# Patient Record
Sex: MALE | Race: AMERICAN INDIAN OR ALASKA NATIVE | Employment: OTHER | URBAN - METROPOLITAN AREA
[De-identification: names, ages, dates, MRNs, and addresses within clinical notes are randomized per-mention and may not be internally consistent; named-entity substitution may affect disease eponyms.]

---

## 2017-02-17 ENCOUNTER — OFFICE VISIT (OUTPATIENT)
Dept: FAMILY MEDICINE | Facility: CLINIC | Age: 62
End: 2017-02-17
Payer: COMMERCIAL

## 2017-02-17 VITALS
BODY MASS INDEX: 34.18 KG/M2 | SYSTOLIC BLOOD PRESSURE: 130 MMHG | HEART RATE: 84 BPM | HEIGHT: 74 IN | WEIGHT: 266.3 LBS | DIASTOLIC BLOOD PRESSURE: 98 MMHG

## 2017-02-17 DIAGNOSIS — I10 ESSENTIAL HYPERTENSION WITH GOAL BLOOD PRESSURE LESS THAN 140/90: Primary | ICD-10-CM

## 2017-02-17 DIAGNOSIS — H60.312 ACUTE DIFFUSE OTITIS EXTERNA OF LEFT EAR: ICD-10-CM

## 2017-02-17 PROCEDURE — 99214 OFFICE O/P EST MOD 30 MIN: CPT | Performed by: FAMILY MEDICINE

## 2017-02-17 RX ORDER — OFLOXACIN 3 MG/ML
10 SOLUTION AURICULAR (OTIC) 2 TIMES DAILY
Qty: 10 ML | Refills: 5 | Status: SHIPPED | OUTPATIENT
Start: 2017-02-17 | End: 2018-06-12

## 2017-02-17 RX ORDER — METOPROLOL SUCCINATE 50 MG/1
50 TABLET, EXTENDED RELEASE ORAL DAILY
Qty: 90 TABLET | Refills: 1 | Status: SHIPPED | OUTPATIENT
Start: 2017-02-17 | End: 2018-06-12 | Stop reason: SINTOL

## 2017-02-17 NOTE — PATIENT INSTRUCTIONS
We are putting you on Metoprolol XL 50 mg daily to control your blood pressure. Recheck in a month to see how this is working.

## 2017-02-17 NOTE — NURSING NOTE
"Chief Complaint   Patient presents with     Hypertension     recheck/refill on medication     Patient Request     concerns or talk about diverticulitis     Ear Problem     left ear concerns slight discomfort/drainage X 2 weeks. pt. has gtts. and states it is better. pt. states he needs more gtts.        Initial BP (!) 130/98 (BP Location: Right arm, Patient Position: Chair, Cuff Size: Adult Large)  Pulse 84  Ht 6' 2\" (1.88 m)  Wt 266 lb 4.8 oz (120.8 kg)  BMI 34.19 kg/m2 Estimated body mass index is 34.19 kg/(m^2) as calculated from the following:    Height as of this encounter: 6' 2\" (1.88 m).    Weight as of this encounter: 266 lb 4.8 oz (120.8 kg).  Medication Reconciliation: complete     Evelyn Patricia, ANGELLA      "

## 2017-02-17 NOTE — MR AVS SNAPSHOT
"              After Visit Summary   2/17/2017    Jayme Selby    MRN: [id number]           Patient Information     Date Of Birth          1955        Visit Information        Provider Department      2/17/2017 1:40 PM Post, MISSY West MD [facility]        Today's Diagnoses     Essential hypertension with goal blood pressure less than 140/90    -  1    Acute diffuse otitis externa of left ear          Care Instructions    We are putting you on Metoprolol XL 50 mg daily to control your blood pressure. Recheck in a month to see how this is working.        Follow-ups after your visit        Who to contact     If you have questions or need follow up information about today's clinic visit or your schedule please contact [facility] directly at [phone].  Normal or non-critical lab and imaging results will be communicated to you by MyChart, letter or phone within 4 business days after the clinic has received the results. If you do not hear from us within 7 days, please contact the clinic through Careemhart or phone. If you have a critical or abnormal lab result, we will notify you by phone as soon as possible.  Submit refill requests through DataFox or call your pharmacy and they will forward the refill request to us. Please allow 3 business days for your refill to be completed.          Additional Information About Your Visit        MyCharWatchParty Information     DataFox lets you send messages to your doctor, view your test results, renew your prescriptions, schedule appointments and more. To sign up, go to [email or web address] . Click on \"Log in\" on the left side of the screen, which will take you to the Welcome page. Then click on \"Sign up Now\" on the right side of the page.     You will be asked to enter the access code listed below, as well as some personal information. Please follow the directions to create your username and password.     Your access code is: [email or web address]  Expires: " "2017  2:17 PM     Your access code will  in 90 days. If you need help or a new code, please call your [place] clinic or [phone].        Care EveryWhere ID     This is your Care EveryWhere ID. This could be used by other organizations to access your [place] medical records  [id number]        Your Vitals Were     Pulse Height BMI (Body Mass Index)             84 6' 2\" (1.88 m) 34.19 kg/m2          Blood Pressure from Last 3 Encounters:   17 (!) 130/98   16 116/79   11/10/16 124/80    Weight from Last 3 Encounters:   17 266 lb 4.8 oz (120.8 kg)   16 257 lb (116.6 kg)   11/10/16 258 lb 1.6 oz (117.1 kg)              We Performed the Following     Asthma Action Plan (AAP)          Today's Medication Changes          These changes are accurate as of: 17  2:17 PM.  If you have any questions, ask your nurse or doctor.               Start taking these medicines.        Dose/Directions    metoprolol 50 MG 24 hr tablet   Commonly known as:  TOPROL-XL   Used for:  Essential hypertension with goal blood pressure less than 140/90   Started by:  MISSY Aceves MD        Dose:  50 mg   Take 1 tablet (50 mg) by mouth daily   Quantity:  90 tablet   Refills:  1       ofloxacin 0.3 % otic solution   Commonly known as:  FLOXIN   Used for:  Acute diffuse otitis externa of left ear   Started by:  MISSY Aceves MD        Dose:  10 drop   Place 10 drops Into the left ear 2 times daily for 7 days   Quantity:  10 mL   Refills:  5         Stop taking these medicines if you haven't already. Please contact your care team if you have questions.     losartan-hydrochlorothiazide 50-12.5 MG per tablet   Commonly known as:  HYZAAR   Stopped by:  MISSY Aceves MD                Where to get your medicines      These medications were sent to [facility] [address] [place] [address] AT [facility] OF [address] [place], [place] [address] [facility] [address]     Phone:  [phone] "     metoprolol 50 MG 24 hr tablet    ofloxacin 0.3 % otic solution                Primary Care Provider Office Phone # Fax #    R Brett Aceves [phone]       [address] [address]        Thank you!     Thank you for choosing [facility]  for your care. Our goal is always to provide you with excellent care. Hearing back from our patients is one way we can continue to improve our services. Please take a few minutes to complete the written survey that you may receive in the mail after your visit with us. Thank you!             Your Updated Medication List - Protect others around you: Learn how to safely use, store and throw away your medicines at www.disposemymeds.org.          This list is accurate as of: 2/17/17  2:17 PM.  Always use your most recent med list.                   Brand Name Dispense Instructions for use    albuterol 108 (90 BASE) MCG/ACT Inhaler    PROAIR HFA/PROVENTIL HFA/VENTOLIN HFA    1 Inhaler    Inhale 2 puffs into the lungs every 4 hours as needed       amoxicillin-clavulanate 875-125 MG per tablet    AUGMENTIN    20 tablet    Take 1 tablet by mouth 2 times daily       ascorbic acid 250 MG Chew chewable tablet    vitamin C     Take 500 mg by mouth daily       bismuth subsalicylate 262 MG/15ML suspension    PEPTO BISMOL     Take 15 mLs by mouth every 6 hours as needed for indigestion Reported on 2/17/2017       fluticasone 50 MCG/ACT spray    FLONASE    1 Bottle    Spray 2 sprays into both nostrils daily as needed       guaiFENesin-codeine 100-10 MG/5ML Soln solution    ROBITUSSIN AC    120 mL    Take 10 mLs by mouth every 4 hours as needed for cough       metoprolol 50 MG 24 hr tablet    TOPROL-XL    90 tablet    Take 1 tablet (50 mg) by mouth daily       nicotine polacrilex 2 MG lozenge    COMMIT     Place 2 mg inside cheek every hour as needed for smoking cessation       NYQUIL COLD & FLU PO      Take by mouth At  Bedtime Reported on 2/17/2017       ofloxacin 0.3 % otic solution    FLOXIN    10 mL    Place 10 drops Into the left ear 2 times daily for 7 days       ranitidine 75 MG tablet   Generic drug:  ranitidine      Take 1 tablet by mouth daily Reported on 2/17/2017       TUMS 500 MG chewable tablet   Generic drug:  calcium carbonate      Take 1 chew tab by mouth as needed for heartburn       ZYRTEC ALLERGY 10 MG tablet   Generic drug:  cetirizine      Take 10 mg by mouth daily

## 2017-02-17 NOTE — PROGRESS NOTES
"  SUBJECTIVE:                                                    Jayme Selby is a 62 year old male who presents to clinic today for the following health issues:      Hypertension Follow-up      Outpatient blood pressures are not being checked.    Low Salt Diet: low salt       Amount of exercise or physical activity: None    Problems taking medications regularly: yes; he discontinued the losartan/hydrochlorothiazide a couple months ago because he was convinced it was causing an acceptable side effects of nausea and gastrointestinal distress    Medication side effects: ? Cold and sick  Diet: regular (no restrictions)    PROBLEMS TO ADD ON...  Drainage left ear: He's had multiple surgeries on his left ear has had a myringotomy tube in that ear. He has been dealing with some drainage that he feels down the back of his throat. He used some leftover Ofloxacin drops and this seemed to help, so he was wondering if he get a refill    Problem list and histories reviewed & adjusted, as indicated.  Additional history: none              ROS:  Constitutional, HEENT, cardiovascular, pulmonary, gi and gu systems are negative, except as otherwise noted.        OBJECTIVE:                                                    BP (!) 130/98 (BP Location: Right arm, Patient Position: Chair, Cuff Size: Adult Large)  Pulse 84  Ht 6' 2\" (1.88 m)  Wt 266 lb 4.8 oz (120.8 kg)  BMI 34.19 kg/m2    GENERAL: healthy, alert and no distress  EYES: Eyes grossly normal to inspection, extraocular movements - intact, and PERRL  NECK: no tenderness, no adenopathy, no asymmetry, no masses, no stiffness; thyroid- normal to palpation  RESP: lungs clear to auscultation - no rales, no rhonchi, no wheezes  CV: regular rates and rhythm, normal S1 S2, no S3 or S4 and no murmur, no click or rub -  MS: extremities- no gross deformities noted, no edema         ASSESSMENT/PLAN:                                                    ASSESSMENT:  1. Essential " hypertension with goal blood pressure less than 140/90 , inadequately controlled due to noncompliance; it is difficult to convince him that the symptoms he is experiencing aren't not necessarily side effects of the medication    2. Acute diffuse otitis externa of left ear        PLAN:  Orders Placed This Encounter     Asthma Action Plan (AAP)     ofloxacin (FLOXIN) 0.3 % otic solution     metoprolol (TOPROL-XL) 50 MG 24 hr tablet   In reviewing the chart he has never been tried on a beta blocker, so will try metoprolol hopefully he will tolerate this.Discussed how to take the medication(s), expected outcomes, potential side effects.      Patient Instructions   We are putting you on Metoprolol XL 50 mg daily to control your blood pressure. Recheck in a month to see how this is working.      MISSY Aceves  [facility]

## 2017-02-18 ASSESSMENT — ASTHMA QUESTIONNAIRES: ACT_TOTALSCORE: 24

## 2017-02-20 ENCOUNTER — OFFICE VISIT (OUTPATIENT)
Dept: FAMILY MEDICINE | Facility: CLINIC | Age: 62
End: 2017-02-20
Payer: COMMERCIAL

## 2017-02-20 VITALS
DIASTOLIC BLOOD PRESSURE: 100 MMHG | WEIGHT: 266 LBS | BODY MASS INDEX: 34.14 KG/M2 | RESPIRATION RATE: 16 BRPM | HEIGHT: 74 IN | HEART RATE: 96 BPM | TEMPERATURE: 98 F | SYSTOLIC BLOOD PRESSURE: 162 MMHG | OXYGEN SATURATION: 98 %

## 2017-02-20 DIAGNOSIS — J01.90 ACUTE SINUSITIS WITH SYMPTOMS > 10 DAYS: Primary | ICD-10-CM

## 2017-02-20 DIAGNOSIS — I10 BENIGN ESSENTIAL HYPERTENSION: ICD-10-CM

## 2017-02-20 PROCEDURE — 99214 OFFICE O/P EST MOD 30 MIN: CPT | Performed by: NURSE PRACTITIONER

## 2017-02-20 RX ORDER — CHLORTHALIDONE 25 MG/1
25 TABLET ORAL DAILY
Qty: 30 TABLET | Refills: 1 | Status: SHIPPED | OUTPATIENT
Start: 2017-02-20 | End: 2017-03-10 | Stop reason: SINTOL

## 2017-02-20 NOTE — NURSING NOTE
"Chief Complaint   Patient presents with     URI       Initial BP (!) 162/100 (BP Location: Right arm, Patient Position: Chair, Cuff Size: Adult Large)  Pulse 96  Temp 98  F (36.7  C) (Oral)  Resp 16  Ht 6' 2\" (1.88 m)  Wt 266 lb (120.7 kg)  SpO2 98%  BMI 34.15 kg/m2 Estimated body mass index is 34.15 kg/(m^2) as calculated from the following:    Height as of this encounter: 6' 2\" (1.88 m).    Weight as of this encounter: 266 lb (120.7 kg).  Medication Reconciliation: complete  "

## 2017-02-20 NOTE — PATIENT INSTRUCTIONS
Will try a new medication for your blood pressure as it was quite high today.  Return to clinic next week to have the staff check your pressure to make sure it's coming down.  Augmentin for your sinuses.  Follow up if symptoms persist or worsen and as needed.        Thank you for choosing [facility].  You may be receiving a survey in the mail from [facility] regarding your visit today.  Please take a few minutes to complete and return the survey to let us know how we are doing.      Our Clinic hours are:  Mondays    7:20 am - 7 pm  Tues -  Fri  7:20 am - 5 pm    Clinic Phone: [phone]    The clinic lab opens at 7:30 am Mon - Fri and appointments are required.    [facility]  Ph. [phone]  Monday-Thursday 8 am - 7pm  Tues/Wed/Fri 8 am - 5:30 pm

## 2017-02-20 NOTE — MR AVS SNAPSHOT
After Visit Summary   2/20/2017    Jayme Selby    MRN: [id number]           Patient Information     Date Of Birth          1955        Visit Information        Provider Department      2/20/2017 3:20 PM Holly Robbins APRN CNP [facility]        Today's Diagnoses     Acute sinusitis with symptoms > 10 days    -  1    Benign essential hypertension          Care Instructions    Will try a new medication for your blood pressure as it was quite high today.  Return to clinic next week to have the staff check your pressure to make sure it's coming down.  Augmentin for your sinuses.  Follow up if symptoms persist or worsen and as needed.        Thank you for choosing [facility].  You may be receiving a survey in the mail from [employer] regarding your visit today.  Please take a few minutes to complete and return the survey to let us know how we are doing.      Our Clinic hours are:  Mondays    7:20 am - 7 pm  Tues -  Fri  7:20 am - 5 pm    Clinic Phone: [phone]    The clinic lab opens at 7:30 am Mon - Fri and appointments are required.    [facility]  Ph. [phone]  Monday-Thursday 8 am - 7pm  Tues/Wed/Fri 8 am - 5:30 pm               Follow-ups after your visit        Who to contact     If you have questions or need follow up information about today's clinic visit or your schedule please contact [facility] directly at [phone].  Normal or non-critical lab and imaging results will be communicated to you by MyChart, letter or phone within 4 business days after the clinic has received the results. If you do not hear from us within 7 days, please contact the clinic through MyChart or phone. If you have a critical or abnormal lab result, we will notify you by phone as soon as possible.  Submit refill requests through King[employer] Cloud or call your pharmacy and they will forward the refill request to us. Please allow 3 business days for  "your refill to be completed.          Additional Information About Your Visit        TechShophart Information     Coquelux lets you send messages to your doctor, view your test results, renew your prescriptions, schedule appointments and more. To sign up, go to [email or web address] . Click on \"Log in\" on the left side of the screen, which will take you to the Welcome page. Then click on \"Sign up Now\" on the right side of the page.     You will be asked to enter the access code listed below, as well as some personal information. Please follow the directions to create your username and password.     Your access code is: [email or web address]  Expires: 2017  2:17 PM     Your access code will  in 90 days. If you need help or a new code, please call your [place] clinic or [phone].        Care EveryWhere ID     This is your Care EveryWhere ID. This could be used by other organizations to access your [place] medical records  [id number]        Your Vitals Were     Pulse Temperature Respirations Height Pulse Oximetry BMI (Body Mass Index)    96 98  F (36.7  C) (Oral) 16 6' 2\" (1.88 m) 98% 34.15 kg/m2       Blood Pressure from Last 3 Encounters:   17 (!) (P) 164/110   17 (!) 130/98   16 116/79    Weight from Last 3 Encounters:   17 266 lb (120.7 kg)   17 266 lb 4.8 oz (120.8 kg)   16 257 lb (116.6 kg)              Today, you had the following     No orders found for display         Today's Medication Changes          These changes are accurate as of: 17  3:53 PM.  If you have any questions, ask your nurse or doctor.               Start taking these medicines.        Dose/Directions    amoxicillin-clavulanate 875-125 MG per tablet   Commonly known as:  AUGMENTIN   Used for:  Acute sinusitis with symptoms > 10 days   Started by:  Holly Robbins APRN CNP        Dose:  1 tablet   Take 1 tablet by mouth 2 times daily   Quantity:  20 tablet   Refills:  0       chlorthalidone 25 " MG tablet   Commonly known as:  HYGROTON   Used for:  Benign essential hypertension   Started by:  Holly Robbins APRN CNP        Dose:  25 mg   Take 1 tablet (25 mg) by mouth daily   Quantity:  30 tablet   Refills:  1            Where to get your medicines      These medications were sent to [facility] [address]  [address] [facility] [address]     Phone:  [phone]     amoxicillin-clavulanate 875-125 MG per tablet    chlorthalidone 25 MG tablet                Primary Care Provider Office Phone # Fax #    R Brett Aceves [phone]       [facility] [address] [facility] [address]        Thank you!     Thank you for choosing [facility]  for your care. Our goal is always to provide you with excellent care. Hearing back from our patients is one way we can continue to improve our services. Please take a few minutes to complete the written survey that you may receive in the mail after your visit with us. Thank you!             Your Updated Medication List - Protect others around you: Learn how to safely use, store and throw away your medicines at www.disposemymeds.org.          This list is accurate as of: 2/20/17  3:53 PM.  Always use your most recent med list.                   Brand Name Dispense Instructions for use    albuterol 108 (90 BASE) MCG/ACT Inhaler    PROAIR HFA/PROVENTIL HFA/VENTOLIN HFA    1 Inhaler    Inhale 2 puffs into the lungs every 4 hours as needed       amoxicillin-clavulanate 875-125 MG per tablet    AUGMENTIN    20 tablet    Take 1 tablet by mouth 2 times daily       ascorbic acid 250 MG Chew chewable tablet    vitamin C     Take 500 mg by mouth daily       bismuth subsalicylate 262 MG/15ML suspension    PEPTO BISMOL     Take 15 mLs by mouth every 6 hours as needed for indigestion Reported on 2/17/2017       chlorthalidone 25 MG tablet    HYGROTON    30  tablet    Take 1 tablet (25 mg) by mouth daily       fluticasone 50 MCG/ACT spray    FLONASE    1 Bottle    Spray 2 sprays into both nostrils daily as needed       metoprolol 50 MG 24 hr tablet    TOPROL-XL    90 tablet    Take 1 tablet (50 mg) by mouth daily       nicotine polacrilex 2 MG lozenge    COMMIT     Place 2 mg inside cheek every hour as needed for smoking cessation Reported on 2/20/2017       NYQUIL COLD & FLU PO      Take by mouth At Bedtime Reported on 2/17/2017       ofloxacin 0.3 % otic solution    FLOXIN    10 mL    Place 10 drops Into the left ear 2 times daily for 7 days       ranitidine 75 MG tablet   Generic drug:  ranitidine      Take 1 tablet by mouth daily Reported on 2/20/2017       TUMS 500 MG chewable tablet   Generic drug:  calcium carbonate      Take 1 chew tab by mouth as needed for heartburn       ZYRTEC ALLERGY 10 MG tablet   Generic drug:  cetirizine      Take 10 mg by mouth daily

## 2017-02-20 NOTE — PROGRESS NOTES
"  SUBJECTIVE:                                                    Jayme Selby is a 62 year old male who presents to clinic today for the following health issues:  not on any medications for blood pressure.    ENT Symptoms             Symptoms: cc Present Absent Comment   Fever/Chills  x     Fatigue  x     Muscle Aches  x     Eye Irritation  x     Sneezing  x     Nasal Cuauhtemoc/Drg  x     Sinus Pressure/Pain  x     Loss of smell   x    Dental pain  x  left   Sore Throat   x    Swollen Glands   x    Ear Pain/Fullness   x    Cough x x     Wheeze   x    Chest Pain   x    Shortness of breath  x  With productive cough   Rash   x    Other         Symptom duration:  2 years, every time he takes a blood pressure medication he gets a productive cough.   Symptom severity:  mod   Treatments tried:  stop taking all blood pressure medication.   Contacts:  everyone             Problem list and histories reviewed & adjusted, as indicated.  Additional history: he states he just doesn't feel well and he blames blood pressure medications on his recurrent concerns with URI and sinusitis.  He apparently has stopped his beta blocker because he became sick again and a family member questioned why he would \"ever be on that\"   After some discussion he seemed to calm down and be more understanding. He's apparently failed lisinopril, an ARB and Norvasc for various reasons.  He was to have a tooth extracted this week but stated with his pressure being so high they likely will not do that, that seemed to hit home with him.  Little to no motivation to stop his \"few smokes\" he does when socializing with friends over beers.  Sinus issues worsening over the past few days.      Patient Active Problem List   Diagnosis     Tobacco use disorder     Allergic rhinitis due to pollen     Hyperlipidemia LDL goal <130     Asthma     Advanced directives, counseling/discussion     Sinusitis, chronic     Allergic rhinitis due to animal dander     Seasonal allergic " rhinitis     Diagnostic skin and sensitization tests (aka ALLERGENS)     Mild intermittent asthma without complication     Essential hypertension with goal blood pressure less than 140/90     Sigmoid diverticulitis     Past Surgical History   Procedure Laterality Date     Cholecystectomy  09/1999     Ethmoidectomy  5/7/2014     Procedure: ETHMOIDECTOMY;  Surgeon: Kai Sandoval MD;  Location: [facility]     Ethmoidectomy  6/4/2014     Procedure: ETHMOIDECTOMY;  Surgeon: Kai Sandoval MD;  Location: [facility]     Tonsillectomy       Tympanotomy exploratory Left 1/29/2015     Procedure: TYMPANOTOMY EXPLORATORY;  Surgeon: Michel He MD;  Location:  OR       Social History   Substance Use Topics     Smoking status: Light Tobacco Smoker     Packs/day: 0.50     Years: 40.00     Types: Cigarettes     Smokeless tobacco: Never Used      Comment: occassional cig     Alcohol use Yes      Comment: 5-8 drinks 1 day per week     Family History   Problem Relation Age of Onset     DIABETES Mother      LUNG DISEASE Mother      Hypertension Father      C.A.D. Father      Hyperlipidemia Father          Current Outpatient Prescriptions   Medication Sig Dispense Refill     chlorthalidone (HYGROTON) 25 MG tablet Take 1 tablet (25 mg) by mouth daily 30 tablet 1     amoxicillin-clavulanate (AUGMENTIN) 875-125 MG per tablet Take 1 tablet by mouth 2 times daily 20 tablet 0     ofloxacin (FLOXIN) 0.3 % otic solution Place 10 drops Into the left ear 2 times daily for 7 days 10 mL 5     fluticasone (FLONASE) 50 MCG/ACT nasal spray Spray 2 sprays into both nostrils daily as needed 1 Bottle 3     bismuth subsalicylate (PEPTO BISMOL) 262 MG/15ML suspension Take 15 mLs by mouth every 6 hours as needed for indigestion Reported on 2/17/2017       ascorbic acid (VITAMIN C) 250 MG CHEW Take 500 mg by mouth daily       albuterol (PROAIR HFA, PROVENTIL HFA, VENTOLIN HFA) 108 (90 BASE) MCG/ACT inhaler Inhale 2 puffs into the lungs every  "4 hours as needed 1 Inhaler 5     calcium carbonate (TUMS) 500 MG chewable tablet Take 1 chew tab by mouth as needed for heartburn        DM-Doxylamine-Acetaminophen (NYQUIL COLD & FLU PO) Take by mouth At Bedtime Reported on 2/17/2017       cetirizine (ZYRTEC ALLERGY) 10 MG tablet Take 10 mg by mouth daily       metoprolol (TOPROL-XL) 50 MG 24 hr tablet Take 1 tablet (50 mg) by mouth daily (Patient not taking: Reported on 2/20/2017) 90 tablet 1     nicotine polacrilex (COMMIT) 2 MG lozenge Place 2 mg inside cheek every hour as needed for smoking cessation Reported on 2/20/2017       ranitidine (ZANTAC 75) 75 MG tablet Take 1 tablet by mouth daily Reported on 2/20/2017       Allergies   Allergen Reactions     Advair Diskus Other (See Comments)     Adverse reaction per previous medical records.  Vision changes     Lisinopril Cough     Per patient.     BP Readings from Last 3 Encounters:   02/20/17 (!) (P) 164/110   02/17/17 (!) 130/98   11/17/16 116/79    Wt Readings from Last 3 Encounters:   02/20/17 266 lb (120.7 kg)   02/17/17 266 lb 4.8 oz (120.8 kg)   11/17/16 257 lb (116.6 kg)                     ROS: 10 point ROS neg other than the symptoms noted above in the HPI.    OBJECTIVE:                                                    BP (!) (P) 164/110  Pulse 96  Temp 98  F (36.7  C) (Oral)  Resp 16  Ht 6' 2\" (1.88 m)  Wt 266 lb (120.7 kg)  SpO2 98%  BMI 34.15 kg/m2  Body mass index is 34.15 kg/(m^2).  GENERAL: healthy, alert and no distress  HENT: ear canals and TM's normal, pharynx with mild erythema, mild pan sinus tenderness  NECK: no adenopathy, no asymmetry  RESP: lungs clear to auscultation - no rales, rhonchi or wheezes  CV: regular rate and rhythm, normal S1 S2, no S3 or S4, no murmur  ABDOMEN: soft, nontender  MS: no gross musculoskeletal defects noted      Diagnostic Test Results:  No results found for this or any previous visit (from the past 24 hour(s)).     ASSESSMENT/PLAN:                              "                               1. Acute sinusitis with symptoms > 10 days    - amoxicillin-clavulanate (AUGMENTIN) 875-125 MG per tablet; Take 1 tablet by mouth 2 times daily  Dispense: 20 tablet; Refill: 0  Discussed how to take the medication(s), expected outcomes, potential side effects.    2. Benign essential hypertension    - chlorthalidone (HYGROTON) 25 MG tablet; Take 1 tablet (25 mg) by mouth daily  Dispense: 30 tablet; Refill: 1  Discussed how to take the medication(s), expected outcomes, potential side effects.  Long discussion regarding hypertension and the importance of treating that. He seemed to be understanding although medications are a huge barrier to him. He is in agreement to trying chlorthalidone and starting at a low dose and working up on that. Cautioned against reading too much into the literature given with prescriptions and to take other's opinion lightly. He needs to get his pressure under control or is at high risk for stroke or heart attack. Little motivation to stop smoking. He will watch caffeine intake.   Return to clinic next week to check blood pressure and if it remains elevated will increase Chlorthalidone to 50 mg daily.    See Patient Instructions  Patient Instructions   Will try a new medication for your blood pressure as it was quite high today.  Return to clinic next week to have the staff check your pressure to make sure it's coming down.  Augmentin for your sinuses.  Follow up if symptoms persist or worsen and as needed.        Thank you for choosing [facility].  You may be receiving a survey in the mail from [employer] regarding your visit today.  Please take a few minutes to complete and return the survey to let us know how we are doing.      Our Clinic hours are:  Mondays    7:20 am - 7 pm  Tues -  Fri  7:20 am - 5 pm    Clinic Phone: [phone]    The clinic lab opens at 7:30 am Mon - Fri and appointments are required.    [facility].  [phone]  Monday-Thursday 8 am - 7pm  Tues/Wed/Fri 8 am - 5:30 pm             STEPAN Evans [facility]

## 2017-02-28 ENCOUNTER — OFFICE VISIT (OUTPATIENT)
Dept: FAMILY MEDICINE | Facility: CLINIC | Age: 62
End: 2017-02-28
Payer: COMMERCIAL

## 2017-02-28 VITALS
WEIGHT: 260 LBS | DIASTOLIC BLOOD PRESSURE: 95 MMHG | BODY MASS INDEX: 33.37 KG/M2 | SYSTOLIC BLOOD PRESSURE: 133 MMHG | TEMPERATURE: 97.8 F | HEIGHT: 74 IN | RESPIRATION RATE: 16 BRPM | HEART RATE: 111 BPM

## 2017-02-28 DIAGNOSIS — I10 BENIGN ESSENTIAL HYPERTENSION: Primary | ICD-10-CM

## 2017-02-28 DIAGNOSIS — J30.2 SEASONAL ALLERGIC RHINITIS, UNSPECIFIED ALLERGIC RHINITIS TRIGGER: ICD-10-CM

## 2017-02-28 DIAGNOSIS — J32.9 CHRONIC SINUSITIS, UNSPECIFIED LOCATION: ICD-10-CM

## 2017-02-28 PROCEDURE — 99214 OFFICE O/P EST MOD 30 MIN: CPT | Performed by: NURSE PRACTITIONER

## 2017-02-28 NOTE — MR AVS SNAPSHOT
After Visit Summary   2/28/2017    Jayme Selby    MRN: [id number]           Patient Information     Date Of Birth          1955        Visit Information        Provider Department      2/28/2017 11:00 AM Holly Robbins APRN CNP [facility]        Today's Diagnoses     Benign essential hypertension    -  1    Seasonal allergic rhinitis, unspecified allergic rhinitis trigger          Care Instructions    I encourage you to give the blood pressure medication another try.  Call to schedule with cardiology for further help managing your high blood pressure.  Finish antibiotic, no signs of recurrent infections today.  Call to schedule with allergist.  Good job with smoking cessation and keep that up!  Follow up if symptoms persist or worsen and as needed.        Thank you for choosing [facility].  You may be receiving a survey in the mail from [employer] regarding your visit today.  Please take a few minutes to complete and return the survey to let us know how we are doing.      Our Clinic hours are:  Mondays    7:20 am - 7 pm  Tues -  Fri  7:20 am - 5 pm    Clinic Phone: [phone]    The clinic lab opens at 7:30 am Mon - Fri and appointments are required.    [facility]. [phone]  Monday-Thursday 8 am - 7pm  Tues/Wed/Fri 8 am - 5:30 pm               Follow-ups after your visit        Additional Services     ALLERGY/ASTHMA ADULT REFERRAL       Your provider has referred you to: [facility]: [facility] [phone] [email or web address]    Please be aware that coverage of these services is subject to the terms and limitations of your health insurance plan.  Call member services at your health plan with any benefit or coverage questions.      Please bring the following with you to your appointment:    (1) Any X-Rays, CTs or MRIs which have been performed.  Contact the facility where they were done to arrange  for  prior to your scheduled appointment.    (2) List of current medications  (3) This referral request   (4) Any documents/labs given to you for this referral            CARDIOLOGY EVAL ADULT REFERRAL       Your provider has referred you to:  [facility]: [facility] [phone]   [email or web address]    Please be aware that coverage of these services is subject to the terms and limitations of your health insurance plan.  Call member services at your health plan with any benefit or coverage questions.      Type of Referral:  Hypertension treatment    Timeframe requested:  1 Week    Please bring the following to your appointment:  >>   Any x-rays, CTs or MRIs which have been performed.  Contact the facility where they were done to arrange for  prior to your scheduled appointment.    >>   List of current medications  >>   This referral request   >>   Any documents/labs given to you for this referral                  Who to contact     If you have questions or need follow up information about today's clinic visit or your schedule please contact [facility] directly at [phone].  Normal or non-critical lab and imaging results will be communicated to you by MyChart, letter or phone within 4 business days after the clinic has received the results. If you do not hear from us within 7 days, please contact the clinic through [employer] or phone. If you have a critical or abnormal lab result, we will notify you by phone as soon as possible.  Submit refill requests through [employer] or call your pharmacy and they will forward the refill request to us. Please allow 3 business days for your refill to be completed.          Additional Information About Your Visit        [employer] Information     [employer] lets you send messages to your doctor, view your test results, renew your prescriptions, schedule appointments and more. To  "sign up, go to [email or web address] . Click on \"Log in\" on the left side of the screen, which will take you to the Welcome page. Then click on \"Sign up Now\" on the right side of the page.     You will be asked to enter the access code listed below, as well as some personal information. Please follow the directions to create your username and password.     Your access code is: [email or web address]  Expires: 2017  2:17 PM     Your access code will  in 90 days. If you need help or a new code, please call your [facility] clinic or [phone].        Care EveryWhere ID     This is your Care EveryWhere ID. This could be used by other organizations to access your [facility] medical records  [id number]        Your Vitals Were     Pulse Temperature Respirations Height BMI (Body Mass Index)       111 97.8  F (36.6  C) (Oral) 16 6' 2\" (1.88 m) 33.38 kg/m2        Blood Pressure from Last 3 Encounters:   17 (!) 133/95   17 (!) (P) 164/110   17 (!) 130/98    Weight from Last 3 Encounters:   17 260 lb (117.9 kg)   17 266 lb (120.7 kg)   17 266 lb 4.8 oz (120.8 kg)              We Performed the Following     ALLERGY/ASTHMA ADULT REFERRAL     CARDIOLOGY EVAL ADULT REFERRAL        Primary Care Provider Office Phone # Fax #    R Brett Aceves [phone]       [address] [address]        Thank you!     Thank you for choosing [facility]  for your care. Our goal is always to provide you with excellent care. Hearing back from our patients is one way we can continue to improve our services. Please take a few minutes to complete the written survey that you may receive in the mail after your visit with us. Thank you!             Your Updated Medication List - Protect others around you: Learn how to safely use, store and throw away your medicines at www.disposemymeds.org.          This list is accurate as of: 17 11:25 " AM.  Always use your most recent med list.                   Brand Name Dispense Instructions for use    albuterol 108 (90 BASE) MCG/ACT Inhaler    PROAIR HFA/PROVENTIL HFA/VENTOLIN HFA    1 Inhaler    Inhale 2 puffs into the lungs every 4 hours as needed       amoxicillin-clavulanate 875-125 MG per tablet    AUGMENTIN    20 tablet    Take 1 tablet by mouth 2 times daily       ascorbic acid 250 MG Chew chewable tablet    vitamin C     Take 500 mg by mouth daily       chlorthalidone 25 MG tablet    HYGROTON    30 tablet    Take 1 tablet (25 mg) by mouth daily       fluticasone 50 MCG/ACT spray    FLONASE    1 Bottle    Spray 2 sprays into both nostrils daily as needed       metoprolol 50 MG 24 hr tablet    TOPROL-XL    90 tablet    Take 1 tablet (50 mg) by mouth daily       nicotine polacrilex 2 MG lozenge    COMMIT     Place 2 mg inside cheek every hour as needed for smoking cessation Reported on 2/20/2017       NYQUIL COLD & FLU PO      Take by mouth At Bedtime Reported on 2/17/2017       ranitidine 75 MG tablet   Generic drug:  ranitidine      Take 1 tablet by mouth daily Reported on 2/28/2017       TUMS 500 MG chewable tablet   Generic drug:  calcium carbonate      Take 1 chew tab by mouth as needed for heartburn       ZYRTEC ALLERGY 10 MG tablet   Generic drug:  cetirizine      Take 10 mg by mouth daily

## 2017-02-28 NOTE — LETTER
[facility]  [address]  [facility] [address]  [phone]  Dept: [phone]      2/28/2017    Re: Jayme OLSEN Sola      TO WHOM IT MAY CONCERN:    Jayme Selby  was seen on 2/28/2017.  Please excuse him  until 3/6/2017 due to illness.    CordSTEPAN Méndez CNP  [facility]

## 2017-02-28 NOTE — PROGRESS NOTES
"  SUBJECTIVE:                                                    Jayme Selby is a 62 year old male who presents to clinic today for the following health issues:  Quit taking the Hygroton yesterday    Pt is here for a recheck on blood pressure. He states he got a sore on his nose and then read the side effects and quit taking it yesterday.         Problem list and histories reviewed & adjusted, as indicated.  Additional history: he once again has stopped his blood pressure medication. He states he got a cough again. He gets a cough after \"every pill given to him\" to treat his blood pressure. He reports the cough as an irritating cough and has \"extra fluid and discharge\" Once stopping the most recent medication, chlorthalidone his cough has stopped.  He's currently finishing antibiotics for another sinus infection, he thinks he's better but would like a note to not work.  He states he has been successful in stopping smoking.  No other specific URI complaint.  No chest pain, shortness of breath or palpitations. He states maybe it's because he's  and that's why he can't handle any blood pressure medications.      Patient Active Problem List   Diagnosis     Tobacco use disorder     Allergic rhinitis due to pollen     Hyperlipidemia LDL goal <130     Asthma     Advanced directives, counseling/discussion     Sinusitis, chronic     Allergic rhinitis due to animal dander     Seasonal allergic rhinitis     Diagnostic skin and sensitization tests (aka ALLERGENS)     Mild intermittent asthma without complication     Essential hypertension with goal blood pressure less than 140/90     Sigmoid diverticulitis     Past Surgical History   Procedure Laterality Date     Cholecystectomy  09/1999     Ethmoidectomy  5/7/2014     Procedure: ETHMOIDECTOMY;  Surgeon: Kai Sandoval MD;  Location: [facility]     Ethmoidectomy  6/4/2014     Procedure: ETHMOIDECTOMY;  Surgeon: Kai Sandoval MD;  Location: [facility]     " Tonsillectomy       Tympanotomy exploratory Left 1/29/2015     Procedure: TYMPANOTOMY EXPLORATORY;  Surgeon: Michel He MD;  Location: [facility]       Social History   Substance Use Topics     Smoking status: Light Tobacco Smoker     Packs/day: 0.50     Years: 40.00     Types: Cigarettes     Smokeless tobacco: Never Used      Comment: occassional cig     Alcohol use Yes      Comment: 5-8 drinks 1 day per week     Family History   Problem Relation Age of Onset     DIABETES Mother      LUNG DISEASE Mother      Hypertension Father      C.A.D. Father      Hyperlipidemia Father          Current Outpatient Prescriptions   Medication Sig Dispense Refill     amoxicillin-clavulanate (AUGMENTIN) 875-125 MG per tablet Take 1 tablet by mouth 2 times daily 20 tablet 0     fluticasone (FLONASE) 50 MCG/ACT nasal spray Spray 2 sprays into both nostrils daily as needed 1 Bottle 3     nicotine polacrilex (COMMIT) 2 MG lozenge Place 2 mg inside cheek every hour as needed for smoking cessation Reported on 2/20/2017       ascorbic acid (VITAMIN C) 250 MG CHEW Take 500 mg by mouth daily       albuterol (PROAIR HFA, PROVENTIL HFA, VENTOLIN HFA) 108 (90 BASE) MCG/ACT inhaler Inhale 2 puffs into the lungs every 4 hours as needed 1 Inhaler 5     calcium carbonate (TUMS) 500 MG chewable tablet Take 1 chew tab by mouth as needed for heartburn        DM-Doxylamine-Acetaminophen (NYQUIL COLD & FLU PO) Take by mouth At Bedtime Reported on 2/17/2017       cetirizine (ZYRTEC ALLERGY) 10 MG tablet Take 10 mg by mouth daily       chlorthalidone (HYGROTON) 25 MG tablet Take 1 tablet (25 mg) by mouth daily (Patient not taking: Reported on 2/28/2017) 30 tablet 1     metoprolol (TOPROL-XL) 50 MG 24 hr tablet Take 1 tablet (50 mg) by mouth daily (Patient not taking: Reported on 2/20/2017) 90 tablet 1     ranitidine (ZANTAC 75) 75 MG tablet Take 1 tablet by mouth daily Reported on 2/28/2017       Allergies   Allergen Reactions     Advair Diskus Other  "(See Comments)     Adverse reaction per previous medical records.  Vision changes     Lisinopril Cough     Per patient.     BP Readings from Last 3 Encounters:   02/28/17 (!) 133/95   02/20/17 (!) (P) 164/110   02/17/17 (!) 130/98    Wt Readings from Last 3 Encounters:   02/28/17 260 lb (117.9 kg)   02/20/17 266 lb (120.7 kg)   02/17/17 266 lb 4.8 oz (120.8 kg)                    Reviewed and updated as needed this visit by clinical staff  Allergies       Reviewed and updated as needed this visit by Provider          ROS: 10 point ROS neg other than the symptoms noted above in the HPI.    OBJECTIVE:                                                    BP (!) 133/95 (BP Location: Right arm, Patient Position: Chair, Cuff Size: Adult Large)  Pulse 111  Temp 97.8  F (36.6  C) (Oral)  Resp 16  Ht 6' 2\" (1.88 m)  Wt 260 lb (117.9 kg)  BMI 33.38 kg/m2  Body mass index is 33.38 kg/(m^2).  GENERAL: healthy, alert and no distress  HENT: ear canals and TM's normal, pharynx without erythema, no sinus tenderness  NECK: no adenopathy, no asymmetry  RESP: lungs clear to auscultation - no rales, rhonchi or wheezes  CV: regular rate and rhythm, normal S1 S2, no S3 or S4, no murmur  ABDOMEN: soft, nontender, no hepatosplenomegaly, no masses and bowel sounds normal  MS: no gross musculoskeletal defects noted      Diagnostic Test Results:  No results found for this or any previous visit (from the past 24 hour(s)).     ASSESSMENT/PLAN:                                                            1. Benign essential hypertension    - CARDIOLOGY EVAL ADULT REFERRAL  Brandon discussion again regarding his belief that every medication to treat his blood pressure he's stopped because he feels he reacts to, primarily gets a cough.  The importance of treating his hypertension and frankly not sure what else to offer him. I strongly encouraged him to re try the chlorthalidone; he states he'll think about it. Is willing to discuss hypertension " management with cardiology so will call to set that referral up.    2. Seasonal allergic rhinitis, unspecified allergic rhinitis trigger    - ALLERGY/ASTHMA ADULT REFERRAL  Will call to schedule consult with allergist as well and hopefully that will address his cough issues and he can resume his blood pressure medication.    3. Chronic sinusitis, unspecified location    Improved and will finish current round of antibiotics, note written for missing work as requested.      See Patient Instructions  Patient Instructions   I encourage you to give the blood pressure medication another try.  Call to schedule with cardiology for further help managing your high blood pressure.  Finish antibiotic, no signs of recurrent infections today.  Call to schedule with allergist.  Good job with smoking cessation and keep that up!  Follow up if symptoms persist or worsen and as needed.        Thank you for choosing [facility].  You may be receiving a survey in the mail from [employer] regarding your visit today.  Please take a few minutes to complete and return the survey to let us know how we are doing.      Our Clinic hours are:  Mondays    7:20 am - 7 pm  Tues -  Fri  7:20 am - 5 pm    Clinic Phone: [phone]    The clinic lab opens at 7:30 am Mon - Fri and appointments are required.    [facility]  Ph. [phone]  Monday-Thursday 8 am - 7pm  Tues/Wed/Fri 8 am - 5:30 pm             STEPAN Evans CNP  [facility]

## 2017-02-28 NOTE — NURSING NOTE
"Chief Complaint   Patient presents with     URI     Hypertension     Medication Reconciliation     Quit taking the Hygroton yesterday       Initial BP (!) 133/95 (BP Location: Right arm, Patient Position: Chair, Cuff Size: Adult Large)  Pulse 111  Temp 97.8  F (36.6  C) (Oral)  Resp 16  Ht 6' 2\" (1.88 m)  Wt 260 lb (117.9 kg)  BMI 33.38 kg/m2 Estimated body mass index is 33.38 kg/(m^2) as calculated from the following:    Height as of this encounter: 6' 2\" (1.88 m).    Weight as of this encounter: 260 lb (117.9 kg).  Medication Reconciliation: complete  "

## 2017-03-10 ENCOUNTER — OFFICE VISIT (OUTPATIENT)
Dept: CARDIOLOGY | Facility: CLINIC | Age: 62
End: 2017-03-10
Attending: NURSE PRACTITIONER
Payer: COMMERCIAL

## 2017-03-10 ENCOUNTER — OFFICE VISIT (OUTPATIENT)
Dept: ALLERGY | Facility: CLINIC | Age: 62
End: 2017-03-10
Payer: COMMERCIAL

## 2017-03-10 VITALS
DIASTOLIC BLOOD PRESSURE: 88 MMHG | OXYGEN SATURATION: 97 % | BODY MASS INDEX: 34.26 KG/M2 | WEIGHT: 266.8 LBS | SYSTOLIC BLOOD PRESSURE: 152 MMHG | HEART RATE: 81 BPM

## 2017-03-10 VITALS
OXYGEN SATURATION: 97 % | SYSTOLIC BLOOD PRESSURE: 145 MMHG | DIASTOLIC BLOOD PRESSURE: 89 MMHG | BODY MASS INDEX: 34.26 KG/M2 | WEIGHT: 266.8 LBS | HEART RATE: 76 BPM

## 2017-03-10 DIAGNOSIS — J45.909 UNCOMPLICATED ASTHMA, UNSPECIFIED ASTHMA SEVERITY: ICD-10-CM

## 2017-03-10 DIAGNOSIS — I10 ESSENTIAL HYPERTENSION WITH GOAL BLOOD PRESSURE LESS THAN 140/90: Primary | ICD-10-CM

## 2017-03-10 DIAGNOSIS — F17.200 TOBACCO USE DISORDER: ICD-10-CM

## 2017-03-10 DIAGNOSIS — J30.9 ATOPIC RHINITIS: ICD-10-CM

## 2017-03-10 DIAGNOSIS — T50.905A MEDICATION REACTION, INITIAL ENCOUNTER: Primary | ICD-10-CM

## 2017-03-10 PROCEDURE — 99204 OFFICE O/P NEW MOD 45 MIN: CPT | Performed by: INTERNAL MEDICINE

## 2017-03-10 PROCEDURE — 99204 OFFICE O/P NEW MOD 45 MIN: CPT | Performed by: ALLERGY & IMMUNOLOGY

## 2017-03-10 RX ORDER — OFLOXACIN 3 MG/ML
SOLUTION/ DROPS OPHTHALMIC
Refills: 5 | COMMUNITY
Start: 2017-02-17 | End: 2018-08-14

## 2017-03-10 ASSESSMENT — ENCOUNTER SYMPTOMS
FATIGUE: 0
VOMITING: 0
COUGH: 1
EYE DISCHARGE: 0
MYALGIAS: 0
HEADACHES: 0
SINUS PRESSURE: 0
EYE REDNESS: 0
RHINORRHEA: 1
SHORTNESS OF BREATH: 0
NAUSEA: 0
ACTIVITY CHANGE: 0
STRIDOR: 0
CHEST TIGHTNESS: 0
ADENOPATHY: 0
WHEEZING: 0
EYE ITCHING: 0
DIARRHEA: 0
FACIAL SWELLING: 0
FEVER: 0

## 2017-03-10 NOTE — MR AVS SNAPSHOT
"              After Visit Summary   3/10/2017    Jayme Selby    MRN: [id number]           Patient Information     Date Of Birth          1955        Visit Information        Provider Department      3/10/2017 10:30 AM Misha Mcgrath MD [facility] PHYSICIAN HEART AT [facility]        Today's Diagnoses     Essential hypertension with goal blood pressure less than 140/90    -  1    Tobacco use disorder           Follow-ups after your visit        Your next 10 appointments already scheduled     Mar 14, 2017  2:45 PM CDT   New Visit with Merry Stanton MD   [facility]    [address] [id number] [phone]              Who to contact     If you have questions or need follow up information about today's clinic visit or your schedule please contact [facility] PHYSICIAN HEART [facility] directly at [phone].  Normal or non-critical lab and imaging results will be communicated to you by MyChart, letter or phone within 4 business days after the clinic has received the results. If you do not hear from us within 7 days, please contact the clinic through MyChart or phone. If you have a critical or abnormal lab result, we will notify you by phone as soon as possible.  Submit refill requests through [employer] [facility] [email or web address] or call your pharmacy and they will forward the refill request to us. Please allow 3 business days for your refill to be completed.          Additional Information About Your Visit        MyChart Information     [email or web address] lets you send messages to your doctor, view your test results, renew your prescriptions, schedule appointments and more. To sign up, go to [email or web address] . Click on \"Log in\" on the left side of the screen, which will take you to the Welcome page. Then click on \"Sign up Now\" on the right side of the page.     You will be asked to enter the access code listed below, as well as some personal " information. Please follow the directions to create your username and password.     Your access code is: [email or web address]  Expires: 2017  2:17 PM     Your access code will  in 90 days. If you need help or a new code, please call your [facility] clinic or [phone].        Care EveryWhere ID     This is your Care EveryWhere ID. This could be used by other organizations to access your [facility] medical records  [id number]        Your Vitals Were     Pulse Pulse Oximetry BMI (Body Mass Index)             76 97% 34.26 kg/m2          Blood Pressure from Last 3 Encounters:   03/10/17 152/88   03/10/17 145/89   17 (!) 133/95    Weight from Last 3 Encounters:   03/10/17 121 kg (266 lb 12.8 oz)   03/10/17 121 kg (266 lb 12.8 oz)   17 117.9 kg (260 lb)              Today, you had the following     No orders found for display         Today's Medication Changes          These changes are accurate as of: 3/10/17  1:03 PM.  If you have any questions, ask your nurse or doctor.               These medicines have changed or have updated prescriptions.        Dose/Directions    fluticasone 50 MCG/ACT spray   Commonly known as:  FLONASE   This may have changed:  when to take this   Used for:  Seasonal allergic rhinitis        Dose:  2 spray   Spray 2 sprays into both nostrils daily as needed   Quantity:  1 Bottle   Refills:  3         Stop taking these medicines if you haven't already. Please contact your care team if you have questions.     chlorthalidone 25 MG tablet   Commonly known as:  HYGROTON   Stopped by:  Misha Mcgrath MD                    Primary Care Provider Office Phone # Fax #    R Brett Aceves [phone]       [address] [address]        Thank you!     Thank you for choosing [facility] PHYSICIAN HEART AT [facility]  for your care. Our goal is always to provide you with excellent care. Hearing back from our patients is  one way we can continue to improve our services. Please take a few minutes to complete the written survey that you may receive in the mail after your visit with us. Thank you!             Your Updated Medication List - Protect others around you: Learn how to safely use, store and throw away your medicines at www.disposemymeds.org.          This list is accurate as of: 3/10/17  1:03 PM.  Always use your most recent med list.                   Brand Name Dispense Instructions for use    albuterol 108 (90 BASE) MCG/ACT Inhaler    PROAIR HFA/PROVENTIL HFA/VENTOLIN HFA    1 Inhaler    Inhale 2 puffs into the lungs every 4 hours as needed       ascorbic acid 250 MG Chew chewable tablet    vitamin C     Take 500 mg by mouth daily       fluticasone 50 MCG/ACT spray    FLONASE    1 Bottle    Spray 2 sprays into both nostrils daily as needed       metoprolol 50 MG 24 hr tablet    TOPROL-XL    90 tablet    Take 1 tablet (50 mg) by mouth daily       nicotine polacrilex 2 MG lozenge    COMMIT     Place 2 mg inside cheek every hour as needed for smoking cessation Reported on 2/20/2017       NYQUIL COLD & FLU PO      Take by mouth At Bedtime Reported on 2/17/2017       ofloxacin 0.3 % ophthalmic solution    OCUFLOX     INT 10 GTS IN LEFT EAR BID FOR 7 DAYS       ranitidine 75 MG tablet   Generic drug:  ranitidine      Take 1 tablet by mouth daily Reported on 3/10/2017       TUMS 500 MG chewable tablet   Generic drug:  calcium carbonate      Take 1 chew tab by mouth as needed for heartburn       ZYRTEC ALLERGY 10 MG tablet   Generic drug:  cetirizine      Take 10 mg by mouth daily

## 2017-03-10 NOTE — NURSING NOTE
"Chief Complaint   Patient presents with     Allergy Consult     Ref by Holly Robbins CNP for medication allergies (BP medications), sinus and ear sx       Initial /88 (BP Location: Left arm, Cuff Size: Adult Large)  Pulse 81  Wt 266 lb 12.8 oz (121 kg)  SpO2 97%  BMI 34.26 kg/m2 Estimated body mass index is 34.26 kg/(m^2) as calculated from the following:    Height as of 2/28/17: 6' 2\" (1.88 m).    Weight as of this encounter: 266 lb 12.8 oz (121 kg).  Medication Reconciliation: complete    "

## 2017-03-10 NOTE — PROGRESS NOTES
HPI and Plan:   See dictation    No orders of the defined types were placed in this encounter.      Orders Placed This Encounter   Medications     ofloxacin (OCUFLOX) 0.3 % ophthalmic solution     Sig: INT 10 GTS IN LEFT EAR BID FOR 7 DAYS     Refill:  5       Medications Discontinued During This Encounter   Medication Reason     amoxicillin-clavulanate (AUGMENTIN) 875-125 MG per tablet Therapy completed     chlorthalidone (HYGROTON) 25 MG tablet Side effects         No diagnosis found.    CURRENT MEDICATIONS:  Current Outpatient Prescriptions   Medication Sig Dispense Refill     ofloxacin (OCUFLOX) 0.3 % ophthalmic solution INT 10 GTS IN LEFT EAR BID FOR 7 DAYS  5     fluticasone (FLONASE) 50 MCG/ACT nasal spray Spray 2 sprays into both nostrils daily as needed 1 Bottle 3     nicotine polacrilex (COMMIT) 2 MG lozenge Place 2 mg inside cheek every hour as needed for smoking cessation Reported on 2/20/2017       ascorbic acid (VITAMIN C) 250 MG CHEW Take 500 mg by mouth daily       calcium carbonate (TUMS) 500 MG chewable tablet Take 1 chew tab by mouth as needed for heartburn        DM-Doxylamine-Acetaminophen (NYQUIL COLD & FLU PO) Take by mouth At Bedtime Reported on 2/17/2017       ranitidine (ZANTAC 75) 75 MG tablet Take 1 tablet by mouth daily Reported on 2/28/2017       cetirizine (ZYRTEC ALLERGY) 10 MG tablet Take 10 mg by mouth daily       metoprolol (TOPROL-XL) 50 MG 24 hr tablet Take 1 tablet (50 mg) by mouth daily (Patient not taking: Reported on 2/20/2017) 90 tablet 1     albuterol (PROAIR HFA, PROVENTIL HFA, VENTOLIN HFA) 108 (90 BASE) MCG/ACT inhaler Inhale 2 puffs into the lungs every 4 hours as needed 1 Inhaler 5       ALLERGIES     Allergies   Allergen Reactions     Advair Diskus Other (See Comments)     Adverse reaction per previous medical records.  Vision changes     Amlodipine Cough     Flu Like Symptoms       Lisinopril Cough     Per patient.     Losartan Cough     Flu like symptoms        Chlorthalidone Rash     Lesions on Face  Chest Pressure         PAST MEDICAL HISTORY:  Past Medical History   Diagnosis Date     Allergic rhinitis due to animal dander      12/5/14 IgE tests pos. only for cat/dog/T (all other environmental allergens NEGATIVE)     Diagnostic skin and sensitization tests (aka ALLERGENS) 12/5/14 IgE tests pos. only for cat/dog/T (all other environmental allergens NEGATIVE)     GERD (gastroesophageal reflux disease)      H. pylori infection 1999     Hypertension      Seasonal allergic rhinitis        PAST SURGICAL HISTORY:  Past Surgical History   Procedure Laterality Date     Cholecystectomy  09/1999     Ethmoidectomy  5/7/2014     Procedure: ETHMOIDECTOMY;  Surgeon: Kai Sandoval MD;  Location: [facility]     Ethmoidectomy  6/4/2014     Procedure: ETHMOIDECTOMY;  Surgeon: Kai Sandoval MD;  Location: [facility]     Tonsillectomy       Tympanotomy exploratory Left 1/29/2015     Procedure: TYMPANOTOMY EXPLORATORY;  Surgeon: Michel He MD;  Location:  OR       FAMILY HISTORY:  Family History   Problem Relation Age of Onset     DIABETES Mother      LUNG DISEASE Mother      Hypertension Father      C.A.D. Father      Hyperlipidemia Father        SOCIAL HISTORY:  Tobacco abuse    Review of Systems:  Skin:  Negative       Eyes:  Negative      ENT:  Positive for tinnitus    Respiratory:  Negative       Cardiovascular:    Positive for    Gastroenterology: Negative      Genitourinary:  Negative      Musculoskeletal:  Positive for nocturnal cramping    Neurologic:  Negative      Psychiatric:  Negative      Heme/Lymph/Imm:  Negative      Endocrine:  Negative        Physical Exam:  Vitals: /89 (BP Location: Right arm, Patient Position: Chair, Cuff Size: Adult Regular)  Pulse 76  Wt 121 kg (266 lb 12.8 oz)  SpO2 97%  BMI 34.26 kg/m2    Constitutional:  cooperative;alert and oriented;in no acute distress        Skin:  warm and dry to the touch        Head:  normocephalic         Eyes:  pupils equal and round        ENT:  no pallor or cyanosis        Neck:  no stiffness        Chest:  clear to auscultation          Cardiac: regular rhythm;normal S1 and S2                  Abdomen:  abdomen soft obese      Vascular: pulses full and equal                                        Extremities and Back:  no edema              Neurological:  affect appropriate              CC  STEP[facility]  [address] [place] [address]

## 2017-03-10 NOTE — PROGRESS NOTES
SUBJECTIVE:                                                               Jayme Selby presents today to our Allergy Clinic at [facility], he is being seen in consultation at the request of Holly Robbins.  As you know, he is a 62 year old male with presumptive allergic rhinitis, asthma and inability to tolerate blood pressure meds.  The patient states that for the 1st week, he has no problems taking blood pressure medicine, and he is doing well, and then on 2nd week she starts getting side effects.  At the beginning of history taking, the patient states that all medicines cause flulike symptoms, manifested by productive cough, loss of appetite and chills.  He may be had rash after taking some of the medicines, but he is not sure which one exactly.  She didn't say that he had any other symptoms, but when I reviewed previous PCP notes, he confirmed other symptoms.  He developed cough with lisinopril.  When he was switched to amlodipine, he developed some leg cramps, and leg swelling, and possibly flulike symptoms.  He also thought he had a fever so he stopped it.  Valsartan cause flulike symptoms manifested by chills and productive cough.  At some point, he was prescribed losartan/hydrochlorothiazide, and then he quit it because he thought he developed nausea and GI distress, and flulike symptoms, so he stopped it.  Chlorthalidone was tried, but according to the notes he developed a sore in his nose him and he quit taking it.  Initially, he said that he tried metoprolol, and he had flulike symptoms, and then he said that he never tried it yet but he is reluctant to do that due to potential side effects.  He is reviewing carefully side effects of every medicine and sometimes may decide whether he would take the medicine are not based on potential side effects.    He thinks he has some mild asthma.  Not sure when he developed it.  He thinks his manifested by wheezing triggered by viral infection.  He  doesn't use albuterol more than twice a week.  PCP manages it.  He denies night awakenings.  He denies being on oral steroids for asthma.  No previous hospitalizations or ED visits for asthma.  He smokes 3-5 cigarettes per day.    He is to do develop recurrent sinus infections.  Had a sinus surgery in 2014.  He is doing well since then, using intranasal fluticasone several times a week.  He also takes cetirizine on as needed basis in Spring and Fall with good symptom control.    The patient doesn't seem to be interested in asthma or allergic rhinitis/sinusitis infections management since his symptoms are well controlled.      Patient Active Problem List   Diagnosis     Tobacco use disorder     Allergic rhinitis due to pollen     Hyperlipidemia LDL goal <130     Asthma     Advanced directives, counseling/discussion     Sinusitis, chronic     Allergic rhinitis due to animal dander     Seasonal allergic rhinitis     Diagnostic skin and sensitization tests (aka ALLERGENS)     Mild intermittent asthma without complication     Essential hypertension with goal blood pressure less than 140/90     Sigmoid diverticulitis       Past Medical History   Diagnosis Date     Allergic rhinitis due to animal dander      12/5/14 IgE tests pos. only for cat/dog/T (all other environmental allergens NEGATIVE)     Diagnostic skin and sensitization tests (aka ALLERGENS) 12/5/14 IgE tests pos. only for cat/dog/T (all other environmental allergens NEGATIVE)     GERD (gastroesophageal reflux disease)      H. pylori infection 1999     Hypertension      Seasonal allergic rhinitis       Problem (# of Occurrences) Relation (Name,Age of Onset)    C.A.D. (1) Father    DIABETES (1) Mother    Hyperlipidemia (1) Father    Hypertension (1) Father    LUNG DISEASE (1) Mother    Seasonal/Environmental Allergies (1) Sister        Past Surgical History   Procedure Laterality Date     Cholecystectomy  09/1999     Ethmoidectomy  5/7/2014     Procedure:  ETHMOIDECTOMY;  Surgeon: Kai Sandoval MD;  Location: [facility]     Ethmoidectomy  6/4/2014     Procedure: ETHMOIDECTOMY;  Surgeon: Kai Sandoval MD;  Location: [facility]     Tonsillectomy       Tympanotomy exploratory Left 1/29/2015     Procedure: TYMPANOTOMY EXPLORATORY;  Surgeon: Michel He MD;  Location: [facility]     Social History     Social History     Marital status:      Spouse name: N/A     Number of children: N/A     Years of education: N/A     Social History Main Topics     Smoking status: Light Tobacco Smoker     Packs/day: 0.50     Years: 40.00     Types: Cigarettes     Smokeless tobacco: Never Used      Comment: occassional cig     Alcohol use Yes      Comment: 5-8 drinks 1 day per week     Drug use: No     Sexual activity: No     Other Topics Concern     Parent/Sibling W/ Cabg, Mi Or Angioplasty Before 65f 55m? Yes     Social History Narrative    March 10, 2017        ENVIRONMENTAL HISTORY: [place] is heated with a forced air and gas furnace. They does have central air conditioning. The patient's bedroom is furnished with carpeting in bedroom, allergen mattress cover, allergen pillowcase cover and fabric window coverings. No pets inside the house. There is not history of cockroach or mice infestation. There are 2 smokers in the house.  The house does not have a basement.                Review of Systems   Constitutional: Negative for activity change, fatigue and fever.   HENT: Positive for congestion and rhinorrhea. Negative for ear pain, facial swelling, nosebleeds, postnasal drip, sinus pressure and sneezing.    Eyes: Negative for discharge, redness and itching.   Respiratory: Positive for cough. Negative for chest tightness, shortness of breath, wheezing and stridor.    Gastrointestinal: Negative for diarrhea, nausea and vomiting.   Musculoskeletal: Negative for myalgias.   Neurological: Negative for headaches.    Hematological: Negative for adenopathy.   Psychiatric/Behavioral: Negative for behavioral problems and self-injury.           Current Outpatient Prescriptions:      ofloxacin (OCUFLOX) 0.3 % ophthalmic solution, INT 10 GTS IN LEFT EAR BID FOR 7 DAYS, Disp: , Rfl: 5     fluticasone (FLONASE) 50 MCG/ACT nasal spray, Spray 2 sprays into both nostrils daily as needed (Patient taking differently: Spray 2 sprays into both nostrils as needed ), Disp: 1 Bottle, Rfl: 3     nicotine polacrilex (COMMIT) 2 MG lozenge, Place 2 mg inside cheek every hour as needed for smoking cessation Reported on 2/20/2017, Disp: , Rfl:      ascorbic acid (VITAMIN C) 250 MG CHEW, Take 500 mg by mouth daily, Disp: , Rfl:      albuterol (PROAIR HFA, PROVENTIL HFA, VENTOLIN HFA) 108 (90 BASE) MCG/ACT inhaler, Inhale 2 puffs into the lungs every 4 hours as needed, Disp: 1 Inhaler, Rfl: 5     calcium carbonate (TUMS) 500 MG chewable tablet, Take 1 chew tab by mouth as needed for heartburn , Disp: , Rfl:      DM-Doxylamine-Acetaminophen (NYQUIL COLD & FLU PO), Take by mouth At Bedtime Reported on 2/17/2017, Disp: , Rfl:      cetirizine (ZYRTEC ALLERGY) 10 MG tablet, Take 10 mg by mouth daily, Disp: , Rfl:      metoprolol (TOPROL-XL) 50 MG 24 hr tablet, Take 1 tablet (50 mg) by mouth daily (Patient not taking: Reported on 2/20/2017), Disp: 90 tablet, Rfl: 1     ranitidine (ZANTAC 75) 75 MG tablet, Take 1 tablet by mouth daily Reported on 3/10/2017, Disp: , Rfl:   Immunization History   Administered Date(s) Administered     TD (ADULT, 7+) 09/26/2005     Allergies   Allergen Reactions     Advair Diskus Other (See Comments)     Adverse reaction per previous medical records.  Vision changes     Amlodipine Cough     Flu Like Symptoms       Lisinopril Cough     Per patient.     Losartan Cough     Flu like symptoms       Chlorthalidone Rash     Lesions on Face  Chest Pressure       OBJECTIVE:                                                                  /88 (BP Location: Left arm, Cuff Size: Adult Large)  Pulse 81  Wt 266 lb 12.8 oz (121 kg)  SpO2 97%  BMI 34.26 kg/m2        Physical Exam   Constitutional: No distress.   HENT:   Head: Normocephalic and atraumatic.   Right Ear: Tympanic membrane, external ear and ear canal normal.   Left Ear: External ear and ear canal normal.   Nose: No mucosal edema or rhinorrhea.   Left TM: PET in place. Scan clear liquid inside the tube. The patient states has ENT visit in 4 days   Eyes: Conjunctivae are normal. Right eye exhibits no discharge. Left eye exhibits no discharge.   Neck: Normal range of motion.   Cardiovascular: Normal rate, regular rhythm and normal heart sounds.    No murmur heard.  Pulmonary/Chest: Effort normal and breath sounds normal. No respiratory distress. He has no wheezes. He has no rales.   Musculoskeletal: Normal range of motion.   Neurological: He is alert.   Skin: Skin is warm. He is not diaphoretic.   Psychiatric: Affect normal.   Nursing note and vitals reviewed.        ASSESSMENT/PLAN:      Problem List Items Addressed This Visit     None      Visit Diagnoses     1. Medication reaction, initial encounter    -  Primary  The reactions that the patient reports do not seem to be IgE mediated reactions.  ACE inhibitors are associated with a dry, persistent cough and 5-35 percent of patients could take them.  ARBs in a lower rate, but can also cause cough.    Calcium channel blockers are associated with peripheral edema and skin rash and if he did have some leg swelling and skin rash with amlodipine, it sounds plausible.    Chlorthalidone associated with a nasal sore doesn't seem to represent an allergic reaction either.    I cannot comment on metoprolol since the patient initially said that he took it and he had flulike symptoms, and then he said that he didn't take it at all.  Theoretically, both selective and nonselective beta blockers may cause bronchoconstriction, which can lead some  patients to experience a cough reflex, but there is no way to say whether the patient will develop or not.  There is no commercially available, well validated skin test test for blood pressure medicine.  My recommendations would be change in lifestyle to potentially decrease blood pressure that way, and a referral to an academic/tertiary facility where patient would be able to be challenged including with placebo.    2. Uncomplicated asthma, unspecified asthma severity     In regards to his asthma, and atopic rhinitis, the patient states that he is well controlled, and those are not primary reasons for his visit.  -He will continue using current medicines and following up with PCP.     3. Atopic rhinitis              Follow up as needed.        Thank you for allowing us to participate in the care of this patient. Please feel free to contact us if there are any questions or concerns about the patient.    Disclaimer: This note consists of symbols derived from keyboarding, dictation and/or voice recognition software. As a result, there may be errors in the script that have gone undetected. Please consider this when interpreting information found in this chart.    Moisés Vitale MD   Allergy, Asthma and Immunology  [place], [place] and [facility]

## 2017-03-10 NOTE — Clinical Note
Dear Ms. Robbins The patient was seen in Allergy Clinic for consultation.  Please see the office visit note for more details. Thank you for the referral!!!

## 2017-03-10 NOTE — MR AVS SNAPSHOT
"              After Visit Summary   3/10/2017    Jayme Selby    MRN: [id number]           Patient Information     Date Of Birth          1955        Visit Information        Provider Department      3/10/2017 1:00 PM Moisés Vitale MD [facility]        Today's Diagnoses     Medication reaction, initial encounter    -  1    Uncomplicated asthma, unspecified asthma severity        Atopic rhinitis           Follow-ups after your visit        Your next 10 appointments already scheduled     Mar 14, 2017  2:45 PM CDT   New Visit with Merry Stanton MD   [facility] ([facility]    [address] [address]   [phone]              Who to contact     If you have questions or need follow up information about today's clinic visit or your schedule please contact [facility] directly at [phone].  Normal or non-critical lab and imaging results will be communicated to you by MyChart, letter or phone within 4 business days after the clinic has received the results. If you do not hear from us within 7 days, please contact the clinic through MyChart or phone. If you have a critical or abnormal lab result, we will notify you by phone as soon as possible.  Submit refill requests through [employer] or call your pharmacy and they will forward the refill request to us. Please allow 3 business days for your refill to be completed.          Additional Information About Your Visit        MyChart Information     [employer] lets you send messages to your doctor, view your test results, renew your prescriptions, schedule appointments and more. To sign up, go to [email or web address] . Click on \"Log in\" on the left side of the screen, which will take you to the Welcome page. Then click on \"Sign up Now\" on the right side of the page.     You will be asked to enter the access code listed below, as well as some personal information. Please follow the directions " to create your username and password.     Your access code is: [email or web address]  Expires: 2017  3:17 PM     Your access code will  in 90 days. If you need help or a new code, please call your [facility] clinic or [phone].        Care EveryWhere ID     This is your Care EveryWhere ID. This could be used by other organizations to access your [facility] medical records  [id number]        Your Vitals Were     Pulse Pulse Oximetry BMI (Body Mass Index)             81 97% 34.26 kg/m2          Blood Pressure from Last 3 Encounters:   03/10/17 152/88   03/10/17 145/89   17 (!) 133/95    Weight from Last 3 Encounters:   03/10/17 266 lb 12.8 oz (121 kg)   03/10/17 266 lb 12.8 oz (121 kg)   17 260 lb (117.9 kg)              Today, you had the following     No orders found for display         Today's Medication Changes          These changes are accurate as of: 3/10/17 11:59 PM.  If you have any questions, ask your nurse or doctor.               These medicines have changed or have updated prescriptions.        Dose/Directions    fluticasone 50 MCG/ACT spray   Commonly known as:  FLONASE   This may have changed:  when to take this   Used for:  Seasonal allergic rhinitis        Dose:  2 spray   Spray 2 sprays into both nostrils daily as needed   Quantity:  1 Bottle   Refills:  3         Stop taking these medicines if you haven't already. Please contact your care team if you have questions.     chlorthalidone 25 MG tablet   Commonly known as:  HYGROTON   Stopped by:  Misha Mcgrath MD                    Primary Care Provider Office Phone # Fax #    R Brett Aceves [phone]       [address] [address]        Thank you!     Thank you for choosing [facility]  for your care. Our goal is always to provide you with excellent care. Hearing back from our patients is one way we can continue to improve our services. Please take a few minutes  to complete the written survey that you may receive in the mail after your visit with us. Thank you!             Your Updated Medication List - Protect others around you: Learn how to safely use, store and throw away your medicines at www.disposemymeds.org.          This list is accurate as of: 3/10/17 11:59 PM.  Always use your most recent med list.                   Brand Name Dispense Instructions for use    albuterol 108 (90 BASE) MCG/ACT Inhaler    PROAIR HFA/PROVENTIL HFA/VENTOLIN HFA    1 Inhaler    Inhale 2 puffs into the lungs every 4 hours as needed       ascorbic acid 250 MG Chew chewable tablet    vitamin C     Take 500 mg by mouth daily       fluticasone 50 MCG/ACT spray    FLONASE    1 Bottle    Spray 2 sprays into both nostrils daily as needed       metoprolol 50 MG 24 hr tablet    TOPROL-XL    90 tablet    Take 1 tablet (50 mg) by mouth daily       nicotine polacrilex 2 MG lozenge    COMMIT     Place 2 mg inside cheek every hour as needed for smoking cessation Reported on 2/20/2017       NYQUIL COLD & FLU PO      Take by mouth At Bedtime Reported on 2/17/2017       ofloxacin 0.3 % ophthalmic solution    OCUFLOX     INT 10 GTS IN LEFT EAR BID FOR 7 DAYS       ranitidine 75 MG tablet   Generic drug:  ranitidine      Take 1 tablet by mouth daily Reported on 3/10/2017       TUMS 500 MG chewable tablet   Generic drug:  calcium carbonate      Take 1 chew tab by mouth as needed for heartburn       ZYRTEC ALLERGY 10 MG tablet   Generic drug:  cetirizine      Take 10 mg by mouth daily

## 2017-03-10 NOTE — LETTER
3/10/2017    STEPAN Evans CNP  [facility]  [address] [place] [address]    RE: Jayme OLSEN Sola       Dear Colleague,    I had the pleasure of seeing Jayme Selby in the [facility].    REQUESTING PROVIDER:  STEPAN Paris, CNP      INDICATION:  Hypertension and inability to tolerate numerous antihypertensive medications.      Mr. Selby is a pleasant 62-year-old gentleman with new onset borderline hypertension who was referred to Cardiology due to multiple intolerances to multiple antihypertensive medications started in the past.  The patient is essentially asymptomatic from a cardiovascular standpoint, denies any chest pain, pressure, shortness of breath, orthopnea or paroxysmal nocturnal dyspnea.      He has been placed on amlodipine, lisinopril, losartan, chlorthalidone and more recently metoprolol which he has not been taking.  The first 4 medications all resulted in him feeling well for approximately the first week, but then within a week he developed a productive cough.  He also had generalized malaise.  He also developed a skin rash and each time stopped the medication and the symptoms resolved.      The patient is seeing an allergist later today to see if there may be an allergic component to his reactions.      The patient does smoke and does not appear very interested in stopping at this time.      Please see my separate note with a full list of his allergies, medications, past medical history, social history, family history and review of systems.      Please see my separate note with his full physical examination.     Outpatient Encounter Prescriptions as of 3/10/2017   Medication Sig Dispense Refill     ofloxacin (OCUFLOX) 0.3 % ophthalmic solution Reported on 4/6/2017  5     fluticasone (FLONASE) 50 MCG/ACT nasal spray Spray 2 sprays into both nostrils daily as needed (Patient taking differently: Spray 2 sprays into both  nostrils as needed ) 1 Bottle 3     nicotine polacrilex (COMMIT) 2 MG lozenge Place 2 mg inside cheek every hour as needed for smoking cessation Reported on 2/20/2017       ascorbic acid (VITAMIN C) 250 MG CHEW Take 500 mg by mouth daily       calcium carbonate (TUMS) 500 MG chewable tablet Take 1 chew tab by mouth as needed for heartburn        DM-Doxylamine-Acetaminophen (NYQUIL COLD & FLU PO) Take by mouth At Bedtime Reported on 3/14/2017       ranitidine (ZANTAC 75) 75 MG tablet Take 1 tablet by mouth daily Reported on 4/6/2017       cetirizine (ZYRTEC ALLERGY) 10 MG tablet Take 10 mg by mouth daily       [DISCONTINUED] chlorthalidone (HYGROTON) 25 MG tablet Take 1 tablet (25 mg) by mouth daily (Patient not taking: Reported on 2/28/2017) 30 tablet 1     [DISCONTINUED] amoxicillin-clavulanate (AUGMENTIN) 875-125 MG per tablet Take 1 tablet by mouth 2 times daily 20 tablet 0     metoprolol (TOPROL-XL) 50 MG 24 hr tablet Take 1 tablet (50 mg) by mouth daily (Patient not taking: Reported on 4/6/2017) 90 tablet 1     albuterol (PROAIR HFA, PROVENTIL HFA, VENTOLIN HFA) 108 (90 BASE) MCG/ACT inhaler Inhale 2 puffs into the lungs every 4 hours as needed 1 Inhaler 5     No facility-administered encounter medications on file as of 3/10/2017.       IMPRESSION AND PLAN:  Mr. Selby is a pleasant 62-year-old gentleman with borderline hypertension, referred due to difficult to treat blood pressure as the patient has intolerances to multiple antihypertensive medications.  I have reiterated to Mr. Selby that his reactions are somewhat atypical and I have not seen these prior.  I think it is reasonable that he is seeing an allergist to see if there is an allergic component to his intolerance to these multiple antihypertensive medications.  At this time, the patient is quite concerned about starting his metoprolol for fear that he will have recurrent symptoms again.  I therefore spent much of the visit discussing the DASH diet and  weight loss to see if this would help with his borderline hypertension.  He may be able to control his blood pressure through lifestyle modification alone.  I also discussed tobacco cessation with the patient, but he did not appear too interested in this recommendation.  At this time, I do not have any further recommendations and would agree with the medication choices that have been given.  The patient, I believe, can follow with Cardiology on a p.r.n. basis.                 Again, thank you for allowing me to participate in the care of your patient.      Sincerely,    Misha Mcgrath MD     [facility]    cc:   MISSY Aceves MD  [address] [address]

## 2017-03-11 NOTE — PROGRESS NOTES
REQUESTING PROVIDER:  STEPAN Paris, CNP      INDICATION:  Hypertension and inability to tolerate numerous antihypertensive medications.      HISTORY OF PRESENT ILLNESS:  Mr. Selby is a pleasant 62-year-old gentleman with new onset borderline hypertension who was referred to Cardiology due to multiple intolerances to multiple antihypertensive medications started in the past.  The patient is essentially asymptomatic from a cardiovascular standpoint, denies any chest pain, pressure, shortness of breath, orthopnea or paroxysmal nocturnal dyspnea.      He has been placed on amlodipine, lisinopril, losartan, chlorthalidone and more recently metoprolol which he has not been taking.  The first 4 medications all resulted in him feeling well for approximately the first week, but then within a week he developed a productive cough.  He also had generalized malaise.  He also developed a skin rash and each time stopped the medication and the symptoms resolved.      The patient is seeing an allergist later today to see if there may be an allergic component to his reactions.      The patient does smoke and does not appear very interested in stopping at this time.      Please see my separate note with a full list of his allergies, medications, past medical history, social history, family history and review of systems.      Please see my separate note with his full physical examination.      IMPRESSION AND PLAN:  Mr. Selby is a pleasant 62-year-old gentleman with borderline hypertension, referred due to difficult to treat blood pressure as the patient has intolerances to multiple antihypertensive medications.  I have reiterated to Mr. Selby that his reactions are somewhat atypical and I have not seen these prior.  I think it is reasonable that he is seeing an allergist to see if there is an allergic component to his intolerance to these multiple antihypertensive medications.  At this time, the patient is quite concerned about  starting his metoprolol for fear that he will have recurrent symptoms again.  I therefore spent much of the visit discussing the DASH diet and weight loss to see if this would help with his borderline hypertension.  He may be able to control his blood pressure through lifestyle modification alone.  I also discussed tobacco cessation with the patient, but he did not appear too interested in this recommendation.  At this time, I do not have any further recommendations and would agree with the medication choices that have been given.  The patient, I believe, can follow with Cardiology on a p.r.n. basis.         REINA HAND MD             D: 03/10/2017 11:17   T: 03/10/2017 21:02   MT: HUGO      Name:     HARSH LONG   MRN:      [id number]        Account:      [id number]   :      1955           Service Date: 03/10/2017      Document: [id number]

## 2017-03-14 ENCOUNTER — OFFICE VISIT (OUTPATIENT)
Dept: OTOLARYNGOLOGY | Facility: CLINIC | Age: 62
End: 2017-03-14
Payer: COMMERCIAL

## 2017-03-14 VITALS
WEIGHT: 266 LBS | SYSTOLIC BLOOD PRESSURE: 158 MMHG | HEIGHT: 74 IN | HEART RATE: 76 BPM | BODY MASS INDEX: 34.14 KG/M2 | DIASTOLIC BLOOD PRESSURE: 97 MMHG | TEMPERATURE: 98.5 F

## 2017-03-14 DIAGNOSIS — H69.92 DYSFUNCTION OF EUSTACHIAN TUBE, LEFT: Primary | ICD-10-CM

## 2017-03-14 PROCEDURE — 99213 OFFICE O/P EST LOW 20 MIN: CPT | Performed by: OTOLARYNGOLOGY

## 2017-03-14 ASSESSMENT — PAIN SCALES - GENERAL: PAINLEVEL: MILD PAIN (3)

## 2017-03-14 NOTE — NURSING NOTE
"Initial BP (!) 158/97 (BP Location: Right arm, Patient Position: Chair, Cuff Size: Adult Regular)  Pulse 76  Temp 98.5  F (36.9  C) (Oral)  Ht 1.88 m (6' 2\")  Wt 120.7 kg (266 lb)  BMI 34.15 kg/m2 Estimated body mass index is 34.15 kg/(m^2) as calculated from the following:    Height as of this encounter: 1.88 m (6' 2\").    Weight as of this encounter: 120.7 kg (266 lb). .    Omayra Crespo CMA    "

## 2017-03-14 NOTE — PROGRESS NOTES
History of Present Illness - Jayme Selby is a 62 year old male who presents with a recent URI and left ear drainage. He has a history of chronic sinusitis s/p FESS and a left PE tube for left mastoiditis. He feels the left ear is painful to sleep on. He has been having drainage for about 1-2 weeks. This mostly resolved with Cortisporin.     Past Medical History -   Patient Active Problem List   Diagnosis     Tobacco use disorder     Allergic rhinitis due to pollen     Hyperlipidemia LDL goal <130     Asthma     Advanced directives, counseling/discussion     Sinusitis, chronic     Allergic rhinitis due to animal dander     Seasonal allergic rhinitis     Diagnostic skin and sensitization tests (aka ALLERGENS)     Mild intermittent asthma without complication     Essential hypertension with goal blood pressure less than 140/90     Sigmoid diverticulitis       Current Medications -   Current Outpatient Prescriptions:      ofloxacin (OCUFLOX) 0.3 % ophthalmic solution, INT 10 GTS IN LEFT EAR BID FOR 7 DAYS, Disp: , Rfl: 5     metoprolol (TOPROL-XL) 50 MG 24 hr tablet, Take 1 tablet (50 mg) by mouth daily, Disp: 90 tablet, Rfl: 1     fluticasone (FLONASE) 50 MCG/ACT nasal spray, Spray 2 sprays into both nostrils daily as needed (Patient taking differently: Spray 2 sprays into both nostrils as needed ), Disp: 1 Bottle, Rfl: 3     nicotine polacrilex (COMMIT) 2 MG lozenge, Place 2 mg inside cheek every hour as needed for smoking cessation Reported on 2/20/2017, Disp: , Rfl:      ascorbic acid (VITAMIN C) 250 MG CHEW, Take 500 mg by mouth daily, Disp: , Rfl:      albuterol (PROAIR HFA, PROVENTIL HFA, VENTOLIN HFA) 108 (90 BASE) MCG/ACT inhaler, Inhale 2 puffs into the lungs every 4 hours as needed, Disp: 1 Inhaler, Rfl: 5     calcium carbonate (TUMS) 500 MG chewable tablet, Take 1 chew tab by mouth as needed for heartburn , Disp: , Rfl:      ranitidine (ZANTAC 75) 75 MG tablet, Take 1 tablet by mouth daily Reported on  3/10/2017, Disp: , Rfl:      cetirizine (ZYRTEC ALLERGY) 10 MG tablet, Take 10 mg by mouth daily, Disp: , Rfl:      DM-Doxylamine-Acetaminophen (NYQUIL COLD & FLU PO), Take by mouth At Bedtime Reported on 3/14/2017, Disp: , Rfl:     Allergies -   Allergies   Allergen Reactions     Advair Diskus Other (See Comments)     Adverse reaction per previous medical records.  Vision changes     Amlodipine Cough     Flu Like Symptoms       Lisinopril Cough     Per patient.     Losartan Cough     Flu like symptoms       Chlorthalidone Rash     Lesions on Face  Chest Pressure         Social History -   Social History     Social History     Marital status:      Spouse name: N/A     Number of children: N/A     Years of education: N/A     Social History Main Topics     Smoking status: Light Tobacco Smoker     Packs/day: 0.50     Years: 40.00     Types: Cigarettes     Smokeless tobacco: Never Used      Comment: occassional cig     Alcohol use Yes      Comment: 5-8 drinks 1 day per week     Drug use: No     Sexual activity: No     Other Topics Concern     Parent/Sibling W/ Cabg, Mi Or Angioplasty Before 65f 55m? Yes     Social History Narrative    March 10, 2017        ENVIRONMENTAL HISTORY: [place] is heated with a forced air and gas furnace. They does have central air conditioning. The patient's bedroom is furnished with carpeting in bedroom, allergen mattress cover, allergen pillowcase cover and fabric window coverings. No pets inside the house. There is not history of cockroach or mice infestation. There are 2 smokers in the house.  The house does not have a basement.            Family History -   Family History   Problem Relation Age of Onset     DIABETES Mother      LUNG DISEASE Mother      Hypertension Father      C.A.D. Father      Hyperlipidemia Father      Seasonal/Environmental Allergies Sister        Review of Systems - As per HPI and PMHx, otherwise 7 system  "review of the head and neck negative. 10+ system review negative.    Exam:  BP (!) 158/97 (BP Location: Right arm, Patient Position: Chair, Cuff Size: Adult Regular)  Pulse 76  Temp 98.5  F (36.9  C) (Oral)  Ht 1.88 m (6' 2\")  Wt 120.7 kg (266 lb)  BMI 34.15 kg/m2  General - The patient is well nourished and well developed, and appears to have good nutritional status.  Alert and oriented to person and place, answers questions and cooperates with examination appropriately.   Head and Face - Normocephalic and atraumatic, with no gross asymmetry noted of the contour of the facial features.  The facial nerve is intact, with strong symmetric movements.  Eyes - Extraocular movements intact.  Sclera were not icteric or injected, conjunctiva were pink and moist.  Ears - right EAC and TM appears normal, no middle ear effusion. Left EAC with some moist debris and extruded PE tube. The TM is intact, and he is able to easily autoinsufflate.      A/P - Jayme Selby is a 62 year old male with resolving URI, with resulting resolving Eustachian tube dysfunction. His PE tube is extruded, and this may have given him an otitis externa. He is resolved from this. I recommneded returning in 1-2 months for an audiogram if he still has complaints with his ear.      Dr. Merry Stanton MD  Otolaryngology  [facility]    "

## 2017-04-06 ENCOUNTER — OFFICE VISIT (OUTPATIENT)
Dept: FAMILY MEDICINE | Facility: CLINIC | Age: 62
End: 2017-04-06
Payer: COMMERCIAL

## 2017-04-06 VITALS
SYSTOLIC BLOOD PRESSURE: 152 MMHG | DIASTOLIC BLOOD PRESSURE: 92 MMHG | HEART RATE: 76 BPM | WEIGHT: 274.2 LBS | BODY MASS INDEX: 35.21 KG/M2 | TEMPERATURE: 98.2 F

## 2017-04-06 DIAGNOSIS — I10 ESSENTIAL HYPERTENSION WITH GOAL BLOOD PRESSURE LESS THAN 140/90: Primary | ICD-10-CM

## 2017-04-06 DIAGNOSIS — J01.90 ACUTE SINUSITIS WITH SYMPTOMS GREATER THAN 10 DAYS: ICD-10-CM

## 2017-04-06 DIAGNOSIS — F17.200 TOBACCO USE DISORDER: ICD-10-CM

## 2017-04-06 PROCEDURE — 99214 OFFICE O/P EST MOD 30 MIN: CPT | Performed by: FAMILY MEDICINE

## 2017-04-06 NOTE — PROGRESS NOTES
"  SUBJECTIVE:                                                    Jayme Selby is a 62 year old male who presents to clinic today for the following health issues:      Chief Complaint   Patient presents with     URI     sinus infection     Hypertension     been running borderline     He has symptoms of an upper respiratory infection with some nasal congestion. However he does not have any sore throat at this time and very minimal drainage from the sinuses. His ears actually feel pretty good at this time, and if he does feel plugged he can use the correct this by doing a Valsalva maneuver.    For over 2 years we have been trying to find a blood pressure medication that he can tolerate and that will effectively control his blood pressure. It seems that every time he is put on a new medication he will note some side effect and take himself off of the medication. It has been frustrating since most of the side effects he describes are not the typically noted are common side effects. For example after taking hypertonic he developed a rash on his left upper arm and then some pimples on the face. He stopped the medication and then the rash cleared up. He recently saw a cardiologist who encouraged him to stay on metoprolol, but the patient stated he felt more short of breath when on this medication. He states that he was talking to a pharmacist at the [facility] in [place], [place], and was told that he had \"resistant\" hypertension, and that he should see a specialist to figure out a medication that would work for him. I explained to him that my understanding of resistant hypertension is that someone who is on 3 medications and still not well controlled. His problem is that he has not been able to take any of the medications recommended.         Problem list and histories reviewed & adjusted, as indicated.  Additional history: none        Reviewed and updated as needed this visit by clinical " staff  Tobacco  Allergies  Meds  Med Hx  Surg Hx  Fam Hx  Soc Hx      Reviewed and updated as needed this visit by Provider               ROS:  Constitutional, HEENT, cardiovascular, pulmonary, gi and gu systems are negative, except as otherwise noted.        OBJECTIVE:                                                    BP (!) 152/92  Pulse 76  Temp 98.2  F (36.8  C) (Tympanic)  Wt 274 lb 3.2 oz (124.4 kg)  BMI 35.21 kg/m2    GENERAL: healthy, alert and no distress  EYES: Eyes grossly normal to inspection, extraocular movements - intact, and PERRL  HENT: ear canals- normal; TMs- normal; Nose- normal; Mouth- no ulcers, no lesions  NECK: no tenderness, no adenopathy, no asymmetry, no masses, no stiffness; thyroid- normal to palpation  RESP: lungs clear to auscultation - no rales, no rhonchi, no wheezes  CV: regular rates and rhythm, normal S1 S2, no S3 or S4 and no murmur, no click or rub -  MS: extremities- no gross deformities noted, no edema         ASSESSMENT/PLAN:                                                    ASSESSMENT:  1. Essential hypertension with goal blood pressure less than 140/90 not adequately controlled but the problem is his unwillingness to stay on any medication for any length of time and questionable side effects ,    2. Acute sinusitis with symptoms greater than 10 days    3. Tobacco use disorder        PLAN:  Since now he is convinced that he needs to see a specialist to treat his hypertension will refer him to nephrology (has already seen cardiology).    Orders Placed This Encounter     NEPHROLOGY ADULT REFERRAL      will just treat the upper respiratory infection symptomatically. Strongly encouraged to quit smoking    Patient Instructions   Schedule apptmt with the nephrologist to figure out how to treat the hypertension.      Thank you for choosing [facility].  You may be receiving a survey in the mail from [employer] regarding your visit today.  Please take a few minutes  to complete and return the survey to let us know how we are doing.      Our Clinic hours are:  Mondays    7:20 am - 7 pm  Tues -  Fri  7:20 am - 5 pm    Clinic Phone: [phone]    The clinic lab opens at 7:30 am Mon - Fri and appointments are required.    [facility]  Ph. [phone]  Monday-Thursday 8 am - 7pm  Tues/Wed/Fri 8 am - 5:30 pm             MISSY Aceves  [facility]

## 2017-04-06 NOTE — MR AVS SNAPSHOT
After Visit Summary   4/6/2017    Jayme Selby    MRN: [id number]           Patient Information     Date Of Birth          1955        Visit Information        Provider Department      4/6/2017 9:20 AM MISSY Aceves MD [facility]        Today's Diagnoses     Essential hypertension with goal blood pressure less than 140/90    -  1    Acute sinusitis with symptoms greater than 10 days        Tobacco use disorder          Care Instructions    Schedule apptmt with the nephrologist to figure out how to treat the hypertension.      Thank you for choosing [facility].  You may be receiving a survey in the mail from [employer] regarding your visit today.  Please take a few minutes to complete and return the survey to let us know how we are doing.      Our Clinic hours are:  Mondays    7:20 am - 7 pm  Tues -  Fri  7:20 am - 5 pm    Clinic Phone: [phone]    The clinic lab opens at 7:30 am Mon - Fri and appointments are required.    [facility]. [phone]  Monday-Thursday 8 am - 7pm  Tues/Wed/Fri 8 am - 5:30 pm               Follow-ups after your visit        Additional Services     NEPHROLOGY ADULT REFERRAL       Your provider has referred you to: [facility]: [facility] [phone]   [email or web address]    Please be aware that coverage of these services is subject to the terms and limitations of your health insurance plan.  Call member services at your health plan with any benefit or coverage questions.      Reason for referral:  Hypertension, resistant to most medications      Please bring the following to your appointment:    >>   Any x-rays, CTs or MRIs which have been performed.  Contact the facility where they were done to arrange for  prior to your scheduled appointment.   >>   List of current medications   >>   This referral request   >>   Any documents/labs given to you for this referral                  Who  "to contact     If you have questions or need follow up information about today's clinic visit or your schedule please contact [facility] directly at [phone].  Normal or non-critical lab and imaging results will be communicated to you by MyChart, letter or phone within 4 business days after the clinic has received the results. If you do not hear from us within 7 days, please contact the clinic through MyChart or phone. If you have a critical or abnormal lab result, we will notify you by phone as soon as possible.  Submit refill requests through [employer] or call your pharmacy and they will forward the refill request to us. Please allow 3 business days for your refill to be completed.          Additional Information About Your Visit        [facility] Information     [employer] lets you send messages to your doctor, view your test results, renew your prescriptions, schedule appointments and more. To sign up, go to [email or web address] . Click on \"Log in\" on the left side of the screen, which will take you to the Welcome page. Then click on \"Sign up Now\" on the right side of the page.     You will be asked to enter the access code listed below, as well as some personal information. Please follow the directions to create your username and password.     Your access code is: [email or web address]  Expires: 2017  3:17 PM     Your access code will  in 90 days. If you need help or a new code, please call your [place] clinic or [phone].        Care EveryWhere ID     This is your Care EveryWhere ID. This could be used by other organizations to access your [place] medical records  [id number]        Your Vitals Were     Pulse Temperature BMI (Body Mass Index)             76 98.2  F (36.8  C) (Tympanic) 35.21 kg/m2          Blood Pressure from Last 3 Encounters:   17 (!) 152/92   17 (!) 158/97   03/10/17 152/88    Weight from Last 3 Encounters:   17 274 lb 3.2 oz (124.4 kg) "   03/14/17 266 lb (120.7 kg)   03/10/17 266 lb 12.8 oz (121 kg)              We Performed the Following     NEPHROLOGY ADULT REFERRAL          Today's Medication Changes          These changes are accurate as of: 4/6/17 10:08 AM.  If you have any questions, ask your nurse or doctor.               These medicines have changed or have updated prescriptions.        Dose/Directions    fluticasone 50 MCG/ACT spray   Commonly known as:  FLONASE   This may have changed:  when to take this   Used for:  Seasonal allergic rhinitis        Dose:  2 spray   Spray 2 sprays into both nostrils daily as needed   Quantity:  1 Bottle   Refills:  3                Primary Care Provider Office Phone # Fax #    MISSY Aceves [phone]       [place] [address]        Thank you!     Thank you for choosing [facility]  for your care. Our goal is always to provide you with excellent care. Hearing back from our patients is one way we can continue to improve our services. Please take a few minutes to complete the written survey that you may receive in the mail after your visit with us. Thank you!             Your Updated Medication List - Protect others around you: Learn how to safely use, store and throw away your medicines at www.disposemymeds.org.          This list is accurate as of: 4/6/17 10:08 AM.  Always use your most recent med list.                   Brand Name Dispense Instructions for use    albuterol 108 (90 BASE) MCG/ACT Inhaler    PROAIR HFA/PROVENTIL HFA/VENTOLIN HFA    1 Inhaler    Inhale 2 puffs into the lungs every 4 hours as needed       ascorbic acid 250 MG Chew chewable tablet    vitamin C     Take 500 mg by mouth daily       fluticasone 50 MCG/ACT spray    FLONASE    1 Bottle    Spray 2 sprays into both nostrils daily as needed       metoprolol 50 MG 24 hr tablet    TOPROL-XL    90 tablet    Take 1 tablet (50 mg) by mouth daily        nicotine polacrilex 2 MG lozenge    COMMIT     Place 2 mg inside cheek every hour as needed for smoking cessation Reported on 2/20/2017       NYQUIL COLD & FLU PO      Take by mouth At Bedtime Reported on 3/14/2017       ofloxacin 0.3 % ophthalmic solution    OCUFLOX     Reported on 4/6/2017       ranitidine 75 MG tablet   Generic drug:  ranitidine      Take 1 tablet by mouth daily Reported on 4/6/2017       TUMS 500 MG chewable tablet   Generic drug:  calcium carbonate      Take 1 chew tab by mouth as needed for heartburn       ZYRTEC ALLERGY 10 MG tablet   Generic drug:  cetirizine      Take 10 mg by mouth daily

## 2017-04-06 NOTE — NURSING NOTE
"Chief Complaint   Patient presents with     URI     sinus infection     Hypertension     been running borderline       Initial BP (!) 154/92 (Cuff Size: Adult Large)  Pulse 76  Temp 98.2  F (36.8  C) (Tympanic)  Wt 274 lb 3.2 oz (124.4 kg)  BMI 35.21 kg/m2 Estimated body mass index is 35.21 kg/(m^2) as calculated from the following:    Height as of 3/14/17: 6' 2\" (1.88 m).    Weight as of this encounter: 274 lb 3.2 oz (124.4 kg).  Medication Reconciliation: complete   Gracy Card CMA      "

## 2017-04-06 NOTE — PATIENT INSTRUCTIONS
Schedule apptmt with the nephrologist to figure out how to treat the hypertension.      Thank you for choosing [facility].  You may be receiving a survey in the mail from [employer] [facility] regarding your visit today.  Please take a few minutes to complete and return the survey to let us know how we are doing.      Our Clinic hours are:  Mondays    7:20 am - 7 pm  Tues -  Fri  7:20 am - 5 pm    Clinic Phone: [phone]    The clinic lab opens at 7:30 am Mon - Fri and appointments are required.    [facility]. [phone]  Monday-Thursday 8 am - 7pm  Tues/Wed/Fri 8 am - 5:30 pm

## 2017-04-14 ENCOUNTER — TRANSFERRED RECORDS (OUTPATIENT)
Dept: HEALTH INFORMATION MANAGEMENT | Facility: CLINIC | Age: 62
End: 2017-04-14

## 2017-05-24 ENCOUNTER — TELEPHONE (OUTPATIENT)
Dept: FAMILY MEDICINE | Facility: CLINIC | Age: 62
End: 2017-05-24

## 2017-05-24 DIAGNOSIS — J45.20 MILD INTERMITTENT ASTHMA WITHOUT COMPLICATION: Primary | ICD-10-CM

## 2017-05-24 NOTE — TELEPHONE ENCOUNTER
Proair HFA       Last Written Prescription Date: 10/27/2015  Last Fill Quantity: 1, # refills: 5    Last Office Visit with [facility], [facility] or [facility] prescribing provider:  4/6/2017 RP   Future Office Visit:       Date of Last Asthma Action Plan Letter:   Asthma Action Plan Q1 Year    Topic Date Due     Asthma Action Plan - yearly  02/17/2018      Asthma Control Test:   ACT Total Scores 2/17/2017   ACT TOTAL SCORE (Goal Greater than or Equal to 20) 24   In the past 12 months, how many times did you visit the emergency room for your asthma without being admitted to the hospital? 0   In the past 12 months, how many times were you hospitalized overnight because of your asthma? 0       Date of Last Spirometry Test:   No results found for this or any previous visit.

## 2017-05-30 RX ORDER — ALBUTEROL SULFATE 90 UG/1
2 AEROSOL, METERED RESPIRATORY (INHALATION) EVERY 4 HOURS PRN
Qty: 1 INHALER | Refills: 5 | Status: SHIPPED | OUTPATIENT
Start: 2017-05-30 | End: 2018-09-13

## 2017-05-30 NOTE — TELEPHONE ENCOUNTER
Pt is calling because he is almost out and wants this filled today   Please advise     Dorothy Cardoso  Clinic Station

## 2017-05-30 NOTE — TELEPHONE ENCOUNTER
Asthma Control Test 2/17/2017   ACT TOTAL SCORE    ACT Total Score (Goal Greater than or Equal to 20) 24   Prescription approved per [facility] Refill Protocol.  Little DUMONT RN

## 2017-06-11 DIAGNOSIS — J30.2 SEASONAL ALLERGIC RHINITIS: ICD-10-CM

## 2017-06-13 RX ORDER — FLUTICASONE PROPIONATE 50 MCG
SPRAY, SUSPENSION (ML) NASAL
Qty: 3 BOTTLE | Refills: 1 | Status: SHIPPED | OUTPATIENT
Start: 2017-06-13 | End: 2019-04-19

## 2017-06-13 NOTE — TELEPHONE ENCOUNTER
fluticasone (FLONASE) 50 MCG/ACT nasal spray      Last Written Prescription Date: 9/20/16  Last Fill Quantity: 1,  # refills: 3   Last Office Visit with [facility], [facility] or [facility] prescribing provider: 4/6/17

## 2017-07-21 ENCOUNTER — TELEPHONE (OUTPATIENT)
Dept: FAMILY MEDICINE | Facility: CLINIC | Age: 62
End: 2017-07-21

## 2017-07-21 NOTE — TELEPHONE ENCOUNTER
Dr. Aceves,    I called the patient for -    RN hypertension panel management  Offer free BP CK with the RN.  Patient informed this RN he is off all his BP meds and is trying diet and wt loss.  I have encouraged him to stop by for a BP CK. Little DUMONT RN

## 2018-03-14 ENCOUNTER — TELEPHONE (OUTPATIENT)
Dept: FAMILY MEDICINE | Facility: CLINIC | Age: 63
End: 2018-03-14

## 2018-03-14 DIAGNOSIS — Z12.11 SPECIAL SCREENING FOR MALIGNANT NEOPLASMS, COLON: Primary | ICD-10-CM

## 2018-03-14 NOTE — TELEPHONE ENCOUNTER
Panel Management Review      Patient has the following on his problem list:     Asthma review     ACT Total Scores 2/17/2017   ACT TOTAL SCORE -   ASTHMA ER VISITS -   ASTHMA HOSPITALIZATIONS -   ACT TOTAL SCORE (Goal Greater than or Equal to 20) 24   In the past 12 months, how many times did you visit the emergency room for your asthma without being admitted to the hospital? 0   In the past 12 months, how many times were you hospitalized overnight because of your asthma? 0      1. Is Asthma diagnosis on the Problem List? Yes    2. Is Asthma listed on Health Maintenance? Yes    3. Patient is due for:  ACT and AAP    Hypertension   Last three blood pressure readings:  BP Readings from Last 3 Encounters:   04/06/17 (!) 152/92   03/14/17 (!) 158/97   03/10/17 152/88     Blood pressure: FAILED    HTN Guidelines:  Age 18-59 BP range:  Less than 140/90  Age 60-85 with Diabetes:  Less than 140/90  Age 60-85 without Diabetes:  less than 150/90      Composite cancer screening  Chart review shows that this patient is due/due soon for the following Fecal Colorectal (FIT)  Summary:    Patient is due/failing the following:   FIT    Action needed:   Patient needs to do ACT. and Patient needs referral/order: Fit and blood pressure check    Type of outreach:    Phone, spoke to patient.  blood pressure has been running good in the 130's he felt it was high last time due to tube in ear. Patient has no concerns regarding Asthma, his triggers are pollens in the summer time. Patient agreed to fit test.     Questions for provider review:    None                                                                                                                                    Karma Wagoner MA       Chart routed to Care Team .

## 2018-03-15 ASSESSMENT — ASTHMA QUESTIONNAIRES: ACT_TOTALSCORE: 25

## 2018-03-16 PROCEDURE — 82274 ASSAY TEST FOR BLOOD FECAL: CPT | Performed by: FAMILY MEDICINE

## 2018-03-25 LAB — HEMOCCULT STL QL IA: NEGATIVE

## 2018-03-26 DIAGNOSIS — Z12.11 SPECIAL SCREENING FOR MALIGNANT NEOPLASMS, COLON: ICD-10-CM

## 2018-03-26 NOTE — LETTER
[facility][note 2]  [address]  [facility] [address]  Phone: [phone]      3/26/2018     Jayme Selby  [address] [address] [address]      Dear Jayme:    Thank you for allowing me to participate in your care. Your recent test results were reviewed and listed below.      Your results are provided below for your review  Results for orders placed or performed in visit on 03/26/18   Fecal colorectal cancer screen (FIT)   Result Value Ref Range    Occult Blood Scn FIT Negative NEG^Negative               Thank you for choosing [facility]. As a result, please continue with the treatment plan discussed in the office. Return as discussed or sooner if symptoms worsen or fail to improve. If you have any further questions or concerns, please do not hesitate to contact us.      Sincerely,        Dr. Brett Aceves/[facility]

## 2018-06-03 ENCOUNTER — HOSPITAL ENCOUNTER (EMERGENCY)
Facility: CLINIC | Age: 63
Discharge: HOME | End: 2018-06-03
Attending: EMERGENCY MEDICINE | Admitting: EMERGENCY MEDICINE
Payer: COMMERCIAL

## 2018-06-03 VITALS
TEMPERATURE: 98.8 F | RESPIRATION RATE: 16 BRPM | BODY MASS INDEX: 34.01 KG/M2 | SYSTOLIC BLOOD PRESSURE: 180 MMHG | HEART RATE: 78 BPM | WEIGHT: 265 LBS | OXYGEN SATURATION: 98 % | DIASTOLIC BLOOD PRESSURE: 111 MMHG | HEIGHT: 74 IN

## 2018-06-03 DIAGNOSIS — J01.01 ACUTE RECURRENT MAXILLARY SINUSITIS: ICD-10-CM

## 2018-06-03 PROCEDURE — 99282 EMERGENCY DEPT VISIT SF MDM: CPT | Performed by: EMERGENCY MEDICINE

## 2018-06-03 PROCEDURE — 99283 EMERGENCY DEPT VISIT LOW MDM: CPT | Mod: Z6 | Performed by: EMERGENCY MEDICINE

## 2018-06-03 NOTE — ED AVS SNAPSHOT
[facility] Emergency Department    [address] [facility] [facility] [address]    Phone:  [phone]    Fax:  [phone]                                       Jayme Selby   MRN: [id number]    Department:  [facility] Emergency Department   Date of Visit:  6/3/2018           After Visit Summary Signature Page     I have received my discharge instructions, and my questions have been answered. I have discussed any challenges I see with this plan with the nurse or doctor.    ..........................................................................................................................................  Patient/Patient Representative Signature      ..........................................................................................................................................  Patient Representative Print Name and Relationship to Patient    ..................................................               ................................................  Date                                            Time    ..........................................................................................................................................  Reviewed by Signature/Title    ...................................................              ..............................................  Date                                                            Time

## 2018-06-03 NOTE — ED AVS SNAPSHOT
[facility] Emergency Department    [address]    [facility] [address]    Phone:  [phone]    Fax:  [phone]                                       Jayme Selby   MRN: [id number]    Department:  [facility] Emergency Department   Date of Visit:  6/3/2018           Patient Information     Date Of Birth          1955        Your diagnoses for this visit were:     Acute recurrent maxillary sinusitis        You were seen by Casey Chery MD.      Follow-up Information     Follow up with MISSY Aceves MD.    Specialty:  Family Practice    Contact information:    [address] [facility] [id number] [phone]          Discharge Instructions         Sinusitis (Antibiotic Treatment)    The sinuses are air-filled spaces within the bones of the face. They connect to the inside of the nose. Sinusitis is an inflammation of the tissue that lines the sinuses. Sinusitis can occur during a cold. It can also happen due to allergies to pollens and other particles in the air. Sinusitis can cause symptoms of sinus congestion and a feeling of fullness. A sinus infection causes fever, headache, and facial pain. There is often green or yellow fluid draining from the nose or into the back of the throat (post-nasal drip). You have been given antibiotics to treat this condition.  Home care    Take the full course of antibiotics as instructed. Do not stop taking them, even when you feel better.    Drink plenty of water, hot tea, and other liquids. This may help thin nasal mucus. It also may help your sinuses drain fluids.    Heat may help soothe painful areas of your face. Use a towel soaked in hot water. Or,  the shower and direct the warm spray onto your face. Using a vaporizer along with a menthol rub at night may also help soothe symptoms.     An expectorant with guaifenesin may help thin nasal mucus and help your sinuses drain fluids.    You can use an  over-the-counter decongestant, unless a similar medicine was prescribed to you. Nasal sprays work the fastest. Use one that contains phenylephrine or oxymetazoline. First blow your nose gently. Then use the spray. Do not use these medicines more often than directed on the label. If you do, your symptoms may get worse. You may also take pills that contain pseudoephedrine. Don t use products that combine multiple medicines. This is because side effects may be increased. Read labels. You can also ask the pharmacist for help. (People with high blood pressure should not use decongestants. They can raise blood pressure.)    Over-the-counter antihistamines may help if allergies contributed to your sinusitis.      Do not use nasal rinses or irrigation during an acute sinus infection, unless your healthcare provider tells you to. Rinsing may spread the infection to other areas in your sinuses.    Use acetaminophen or ibuprofen to control pain, unless another pain medicine was prescribed to you. If you have chronic liver or kidney disease or ever had a stomach ulcer, talk with your healthcare provider before using these medicines. (Aspirin should never be taken by anyone under age 18 who is ill with a fever. It may cause severe liver damage.)    Don't smoke. This can make symptoms worse.  Follow-up care  Follow up with your healthcare provider or our staff if you are better in 1 week.  When to seek medical advice  Call your healthcare provider if any of these occur:    Facial pain or headache that gets worse    Stiff neck    Unusual drowsiness or confusion    Swelling of your forehead or eyelids    Vision problems, such as blurred or double vision    Fever of 100.4 F (38 C) or higher, or as directed by your healthcare provider    Seizure    Breathing problems    Symptoms don't go away in 10 days  Prevention  Here are steps you can take to help prevent an infection:    Keep good hand washing habits.    Don t have close contact  with people who have sore throats, colds, or other upper respiratory infections.    Don t smoke, and stay away from secondhand smoke.    Stay up to date with of your vaccines.  Date Last Reviewed: 11/1/2017 2000-2017 [employer]. [address] [place] [address]. All rights reserved. This information is not intended as a substitute for professional medical care. Always follow your healthcare professional's instructions.          24 Hour Appointment Hotline       To make an appointment at any [facility], call [phone] ([phone]). If you don't have a family doctor or clinic, we will help you find one. [facility] clinics are conveniently located to serve the needs of you and your family.             Review of your medicines      START taking        Dose / Directions Last dose taken    amoxicillin-clavulanate 875-125 MG per tablet   Commonly known as:  AUGMENTIN   Dose:  1 tablet   Quantity:  20 tablet        Take 1 tablet by mouth 2 times daily for 10 days   Refills:  0          Our records show that you are taking the medicines listed below. If these are incorrect, please call your family doctor or clinic.        Dose / Directions Last dose taken    albuterol 108 (90 Base) MCG/ACT Inhaler   Commonly known as:  PROAIR HFA/PROVENTIL HFA/VENTOLIN HFA   Dose:  2 puff   Quantity:  1 Inhaler        Inhale 2 puffs into the lungs every 4 hours as needed   Refills:  5        ascorbic acid 250 MG Chew chewable tablet   Commonly known as:  vitamin C   Dose:  500 mg        Take 500 mg by mouth daily   Refills:  0        fluticasone 50 MCG/ACT spray   Commonly known as:  FLONASE   Quantity:  3 Bottle        SPRAY TWICE IN EACH NOSTRIL DAILY AS NEEDED   Refills:  1        metoprolol succinate 50 MG 24 hr tablet   Commonly known as:  TOPROL-XL   Dose:  50 mg   Quantity:  90 tablet        Take 1 tablet (50 mg) by mouth daily   Refills:  1        nicotine polacrilex 2 MG lozenge   Commonly  known as:  COMMIT   Dose:  2 mg        Place 2 mg inside cheek every hour as needed for smoking cessation Reported on 2/20/2017   Refills:  0        NYQUIL COLD & FLU PO        Take by mouth At Bedtime Reported on 3/14/2017   Refills:  0        ofloxacin 0.3 % ophthalmic solution   Commonly known as:  OCUFLOX        Reported on 4/6/2017   Refills:  5        ranitidine 75 MG tablet   Dose:  1 tablet   Generic drug:  ranitidine        Take 1 tablet by mouth daily Reported on 4/6/2017   Refills:  0        TUMS 500 MG chewable tablet   Dose:  1 chew tab   Generic drug:  calcium carbonate        Take 1 chew tab by mouth as needed for heartburn   Refills:  0        ZYRTEC ALLERGY 10 MG tablet   Dose:  10 mg   Generic drug:  cetirizine        Take 10 mg by mouth daily   Refills:  0                Prescriptions were sent or printed at these locations (1 Prescription)                   Other Prescriptions                Printed at Department/Unit printer (1 of 1)         amoxicillin-clavulanate (AUGMENTIN) 875-125 MG per tablet                Orders Needing Specimen Collection     None      Pending Results     No orders found from 6/1/2018 to 6/4/2018.            Pending Culture Results     No orders found from 6/1/2018 to 6/4/2018.            Pending Results Instructions     If you had any lab results that were not finalized at the time of your Discharge, you can call the ED Lab Result RN at [phone]. You will be contacted by this team for any positive Lab results or changes in treatment. The nurses are available 7 days a week from 10A to 6:30P.  You can leave a message 24 hours per day and they will return your call.        Test Results From Your Hospital Stay               Thank you for choosing [facility]       Thank you for choosing [facility] for your care. Our goal is always to provide you with excellent care. Hearing back from our patients is one way we can continue to improve our services. Please take a few minutes  "to complete the written survey that you may receive in the mail after you visit with us. Thank you!        [employer] Information     [employer] lets you send messages to your doctor, view your test results, renew your prescriptions, schedule appointments and more. To sign up, go to [email or web address] . Click on \"Log in\" on the left side of the screen, which will take you to the Welcome page. Then click on \"Sign up Now\" on the right side of the page.     You will be asked to enter the access code listed below, as well as some personal information. Please follow the directions to create your username and password.     Your access code is: [email or web address]  Expires: 2018 10:27 PM     Your access code will  in 90 days. If you need help or a new code, please call your [place] clinic or [phone].        Care EveryWhere ID     This is your Care EveryWhere ID. This could be used by other organizations to access your [place] medical records  [id number]        Equal Access to Services     [facility] : Hadii qamar rosa Sosandi, waaxda luqadaha, qaybta kaalmaverónica francis, asya trevino . So [facility] [phone].    ATENCIÓN: Si habla español, tiene a shelley disposición servicios gratuitos de asistencia lingüística. Llame al [phone].    We comply with applicable federal civil rights laws and [place] laws. We do not discriminate on the basis of race, color, national origin, age, disability, sex, sexual orientation, or gender identity.            After Visit Summary       This is your record. Keep this with you and show to your community pharmacist(s) and doctor(s) at your next visit.                  "

## 2018-06-04 NOTE — ED PROVIDER NOTES
History     Chief Complaint   Patient presents with     Sinusitis     HPI  Jayme Selby is a 63 year old male who 2 weeks nasal congestion, facial pressure, left ear discomfort.  History of sinusitis and environmental allergies.  Requesting antibiotics.    Problem List:    Patient Active Problem List    Diagnosis Date Noted     Sigmoid diverticulitis 06/28/2016     Priority: Medium     Essential hypertension with goal blood pressure less than 140/90 06/23/2016     Priority: Medium     Mild intermittent asthma without complication 10/27/2015     Priority: Medium     Diagnosis updated by automated process. Provider to review and confirm.       Allergic rhinitis due to animal dander      Priority: Medium     12/5/14 IgE tests pos. only for cat/dog/T (all other environmental allergens NEGATIVE)       Seasonal allergic rhinitis      Priority: Medium     Diagnostic skin and sensitization tests (aka ALLERGENS)      Priority: Medium     Sinusitis, chronic 10/23/2014     Priority: Medium     Advanced directives, counseling/discussion 04/22/2014     Priority: Medium     Patient does not have an Advance/Health Care Directive (HCD), declines information/referral.    Gracy Byers  April 22, 2014         Hyperlipidemia LDL goal <130 02/25/2014     Priority: Medium     Score not calculated. Missing: Total Cholesterol, HDL  Based history of hypertension and smoking and age has greater than 2 risk factors for CHD.       Tobacco use disorder 02/20/2014     Priority: Medium     Allergic rhinitis due to pollen 02/20/2014     Priority: Medium        Past Medical History:    Past Medical History:   Diagnosis Date     Allergic rhinitis due to animal dander      Diagnostic skin and sensitization tests (aka ALLERGENS) 12/5/14 IgE tests pos. only for cat/dog/T (all other environmental allergens NEGATIVE)     GERD (gastroesophageal reflux disease)      H. pylori infection 1999     Seasonal allergic rhinitis        Past Surgical History:  "   Past Surgical History:   Procedure Laterality Date     CHOLECYSTECTOMY  09/1999     ETHMOIDECTOMY  5/7/2014    Procedure: ETHMOIDECTOMY;  Surgeon: Kai Sandoval MD;  Location: [facility]     ETHMOIDECTOMY  6/4/2014    Procedure: ETHMOIDECTOMY;  Surgeon: Kai Sandoval MD;  Location: [facility]     TONSILLECTOMY       TYMPANOTOMY EXPLORATORY Left 1/29/2015    Procedure: TYMPANOTOMY EXPLORATORY;  Surgeon: Michel He MD;  Location:  OR       Family History:    Family History   Problem Relation Age of Onset     DIABETES Mother      LUNG DISEASE Mother      Hypertension Father      C.A.D. Father      Hyperlipidemia Father      Seasonal/Environmental Allergies Sister        Social History:  Marital Status:   [2]  Social History   Substance Use Topics     Smoking status: Light Tobacco Smoker     Packs/day: 0.50     Years: 40.00     Types: Cigarettes     Smokeless tobacco: Never Used      Comment: occassional cig     Alcohol use Yes      Comment: 5-8 drinks 1 day per week        Medications:      amoxicillin-clavulanate (AUGMENTIN) 875-125 MG per tablet   albuterol (PROAIR HFA/PROVENTIL HFA/VENTOLIN HFA) 108 (90 BASE) MCG/ACT Inhaler   ascorbic acid (VITAMIN C) 250 MG CHEW   calcium carbonate (TUMS) 500 MG chewable tablet   cetirizine (ZYRTEC ALLERGY) 10 MG tablet   DM-Doxylamine-Acetaminophen (NYQUIL COLD & FLU PO)   fluticasone (FLONASE) 50 MCG/ACT spray   metoprolol (TOPROL-XL) 50 MG 24 hr tablet   nicotine polacrilex (COMMIT) 2 MG lozenge   ofloxacin (OCUFLOX) 0.3 % ophthalmic solution   ranitidine (ZANTAC 75) 75 MG tablet         Review of Systems  Problem focused review of systems otherwise negative    Physical Exam   BP: (!) 180/111  Pulse: 78  Temp: 98.8  F (37.1  C)  Resp: 16  Height: 188 cm (6' 2\")  Weight: 120.2 kg (265 lb)  SpO2: 98 %      Physical Exam  Conjunctival mildly injected, nares clear rhinorrhea, TMs unremarkable, oropharynx moist without lesions, lungs clear  ED Course "     ED Course     Procedures               Critical Care time:  none               No results found for this or any previous visit (from the past 24 hour(s)).    Medications - No data to display    Assessments & Plan (with Medical Decision Making)     I have reviewed the nursing notes.    I have reviewed the findings, diagnosis, plan and need for follow up with the patient.       Discharge Medication List as of 6/3/2018 10:28 PM      START taking these medications    Details   amoxicillin-clavulanate (AUGMENTIN) 875-125 MG per tablet Take 1 tablet by mouth 2 times daily for 10 days, Disp-20 tablet, R-0, Local Print             Final diagnoses:   Acute recurrent maxillary sinusitis       6/3/2018   [facility] EMERGENCY DEPARTMENT     Casey Chery MD  06/04/18 0046

## 2018-06-04 NOTE — DISCHARGE INSTRUCTIONS

## 2018-06-04 NOTE — ED NOTES
"Pt reports sinus drainage and congestion for about \"a couple weeks\". Reports he has a strong reaction to his allergies each week, takes zyrtec year around. Reports he comes to ER today because he is \"just sick of dealing with it.\" been using nasal sprays at home with good results, reports his sx are worse at night  "

## 2018-06-12 ENCOUNTER — OFFICE VISIT (OUTPATIENT)
Dept: FAMILY MEDICINE | Facility: CLINIC | Age: 63
End: 2018-06-12
Payer: COMMERCIAL

## 2018-06-12 VITALS
BODY MASS INDEX: 34.65 KG/M2 | HEART RATE: 85 BPM | HEIGHT: 74 IN | SYSTOLIC BLOOD PRESSURE: 154 MMHG | WEIGHT: 270 LBS | DIASTOLIC BLOOD PRESSURE: 106 MMHG | TEMPERATURE: 99.3 F

## 2018-06-12 DIAGNOSIS — H60.312 ACUTE DIFFUSE OTITIS EXTERNA OF LEFT EAR: ICD-10-CM

## 2018-06-12 PROCEDURE — 99214 OFFICE O/P EST MOD 30 MIN: CPT | Performed by: FAMILY MEDICINE

## 2018-06-12 RX ORDER — OFLOXACIN 3 MG/ML
10 SOLUTION AURICULAR (OTIC) 2 TIMES DAILY
Qty: 10 ML | Refills: 5 | Status: SHIPPED | OUTPATIENT
Start: 2018-06-12 | End: 2019-04-19

## 2018-06-12 NOTE — PROGRESS NOTES
"  SUBJECTIVE:   Jayme Selby is a 63 year old male who presents to clinic today for the following health issues:    ENT Symptoms             Symptoms: cc Present Absent Comment   Fever/Chills   x    Fatigue  x     Muscle Aches   x    Eye Irritation   x    Sneezing   x    Nasal Cuauhtemoc/Drg   x    Sinus Pressure/Pain   x    Loss of smell   x    Dental pain   x    Sore Throat   x    Swollen Glands   x    Ear Pain/Fullness  x     Cough  x     Wheeze   x    Chest Pain   x    Shortness of breath   x    Rash   x    Other         Symptom duration:  2-3 weeks   Symptom severity:  moderate   Treatments tried:  augmentin    Contacts:  no known       He was seen in the emergency room about 10 days ago and treated with Augmentin.  This has helped somewhat but he continues with left ear discomfort.  In the past, ofloxacin otic drops have helped settle down the discomfort in his ear and he is requesting this again    Patient would like Rx for ear drops.  He states that they have worked in the past.    Problem list and histories reviewed & adjusted, as indicated.  Additional history: none        Reviewed and updated as needed this visit by clinical staff  Tobacco  Allergies  Med Hx  Surg Hx  Fam Hx  Soc Hx      Reviewed and updated as needed this visit by Provider               ROS:  Constitutional, HEENT, cardiovascular, pulmonary, gi and gu systems are negative, except as otherwise noted.        OBJECTIVE:                                                    BP (!) 154/106  Pulse 85  Temp 99.3  F (37.4  C) (Tympanic)  Ht 6' 2\" (1.88 m)  Wt 270 lb (122.5 kg)  BMI 34.67 kg/m2    GENERAL: healthy, alert and no distress  EYES: Eyes grossly normal to inspection, extraocular movements - intact, and PERRL  HENT: TM fluid left and external ear canal inflamed left  NECK: no tenderness, no adenopathy, no asymmetry, no masses, no stiffness; thyroid- normal to palpation  RESP: lungs clear to auscultation - no rales, no rhonchi, no " wheezes  CV: regular rates and rhythm, normal S1 S2, no S3 or S4 and no murmur, no click or rub -  MS: extremities- no gross deformities noted, no edema         ASSESSMENT/PLAN:                                                    ASSESSMENT:  1. Acute diffuse otitis externa of left ear        PLAN:  Orders Placed This Encounter     ofloxacin (FLOXIN) 0.3 % otic solution     I also discussed with him his elevated blood pressure.  This is been a long-standing problem and he has been tried on multiple medications for blood pressure and seems to get a side effect from all of the.  I discussed with him the risk for vascular complications with untreated hypertension.  He refuses to consider treatment at this time.      MISSY West Post  [facility]

## 2018-06-12 NOTE — NURSING NOTE
"Initial BP (!) 168/107  Pulse 89  Temp 99.3  F (37.4  C) (Tympanic)  Ht 6' 2\" (1.88 m)  Wt 270 lb (122.5 kg)  BMI 34.67 kg/m2 Estimated body mass index is 34.67 kg/(m^2) as calculated from the following:    Height as of this encounter: 6' 2\" (1.88 m).    Weight as of this encounter: 270 lb (122.5 kg). .      "

## 2018-06-12 NOTE — MR AVS SNAPSHOT
"              After Visit Summary   2018    Jayme Selby    MRN: [id number]           Patient Information     Date Of Birth          1955        Visit Information        Provider Department      2018 4:40 PM Yola, MISSY West MD [facility]        Today's Diagnoses     Acute diffuse otitis externa of left ear           Follow-ups after your visit        Who to contact     If you have questions or need follow up information about today's clinic visit or your schedule please contact [facility] directly at [phone].  Normal or non-critical lab and imaging results will be communicated to you by THE ICONIChart, letter or phone within 4 business days after the clinic has received the results. If you do not hear from us within 7 days, please contact the clinic through THE ICONIChart or phone. If you have a critical or abnormal lab result, we will notify you by phone as soon as possible.  Submit refill requests through [employer] or call your pharmacy and they will forward the refill request to us. Please allow 3 business days for your refill to be completed.          Additional Information About Your Visit        MyChart Information     [employer] lets you send messages to your doctor, view your test results, renew your prescriptions, schedule appointments and more. To sign up, go to [email or web address] . Click on \"Log in\" on the left side of the screen, which will take you to the Welcome page. Then click on \"Sign up Now\" on the right side of the page.     You will be asked to enter the access code listed below, as well as some personal information. Please follow the directions to create your username and password.     Your access code is: [email or web address]  Expires: 2018 10:27 PM     Your access code will  in 90 days. If you need help or a new code, please call your [facility] or [phone].        Care EveryWhere ID     This is your Care EveryWhere ID. This could be used by " "other organizations to access your [place] medical records  [id number]        Your Vitals Were     Pulse Temperature Height BMI (Body Mass Index)          85 99.3  F (37.4  C) (Tympanic) 6' 2\" (1.88 m) 34.67 kg/m2         Blood Pressure from Last 3 Encounters:   06/12/18 (!) 154/106   06/03/18 (!) 180/111   04/06/17 (!) 152/92    Weight from Last 3 Encounters:   06/12/18 270 lb (122.5 kg)   06/03/18 265 lb (120.2 kg)   04/06/17 274 lb 3.2 oz (124.4 kg)              Today, you had the following     No orders found for display         Today's Medication Changes          These changes are accurate as of 6/12/18  5:55 PM.  If you have any questions, ask your nurse or doctor.               Start taking these medicines.        Dose/Directions    ofloxacin 0.3 % otic solution   Commonly known as:  FLOXIN   Used for:  Acute diffuse otitis externa of left ear   Started by:  MISSY Aceves MD        Dose:  10 drop   Place 10 drops Into the left ear 2 times daily   Quantity:  10 mL   Refills:  5         Stop taking these medicines if you haven't already. Please contact your care team if you have questions.     metoprolol succinate 50 MG 24 hr tablet   Commonly known as:  TOPROL-XL   Stopped by:  MISSY Aceves MD                Where to get your medicines      These medications were sent to [facility] [address] [place] [address] [address] AT [facility] OF [address] [place] [address]  [address] [address]     Phone:  [phone]     ofloxacin 0.3 % otic solution                Primary Care Provider Office Phone # Fax #    MISSY Aceves [phone] [address] [facility] [address]        Equal Access to Services     JANET JACKSON AH: Yogesh petero Sosandi, waaxda luqadaha, qaybta kaalmada sandiyaverónica, asya gonzalez. So [facility] [phone].    ATENCIÓN: Si habla español, tiene a shelley disposición servicios gratuitos de asistencia lingüística. " Jyoti graham [phone].    We comply with applicable federal civil rights laws and [place] laws. We do not discriminate on the basis of race, color, national origin, age, disability, sex, sexual orientation, or gender identity.            Thank you!     Thank you for choosing [facility]  for your care. Our goal is always to provide you with excellent care. Hearing back from our patients is one way we can continue to improve our services. Please take a few minutes to complete the written survey that you may receive in the mail after your visit with us. Thank you!             Your Updated Medication List - Protect others around you: Learn how to safely use, store and throw away your medicines at www.disposemymeds.org.          This list is accurate as of 6/12/18  5:55 PM.  Always use your most recent med list.                   Brand Name Dispense Instructions for use Diagnosis    albuterol 108 (90 Base) MCG/ACT Inhaler    PROAIR HFA/PROVENTIL HFA/VENTOLIN HFA    1 Inhaler    Inhale 2 puffs into the lungs every 4 hours as needed    Mild intermittent asthma without complication       amoxicillin-clavulanate 875-125 MG per tablet    AUGMENTIN    20 tablet    Take 1 tablet by mouth 2 times daily for 10 days        ascorbic acid 250 MG Chew chewable tablet    vitamin C     Take 500 mg by mouth daily        fluticasone 50 MCG/ACT spray    FLONASE    3 Bottle    SPRAY TWICE IN EACH NOSTRIL DAILY AS NEEDED    Seasonal allergic rhinitis       nicotine polacrilex 2 MG lozenge    COMMIT     Place 2 mg inside cheek every hour as needed for smoking cessation Reported on 2/20/2017        NYQUIL COLD & FLU PO      Take by mouth At Bedtime Reported on 3/14/2017        ofloxacin 0.3 % ophthalmic solution    OCUFLOX     Reported on 4/6/2017        ofloxacin 0.3 % otic solution    FLOXIN    10 mL    Place 10 drops Into the left ear 2 times daily    Acute diffuse otitis externa of left ear       ranitidine 75 MG  tablet   Generic drug:  ranitidine      Take 1 tablet by mouth daily Reported on 4/6/2017        TUMS 500 MG chewable tablet   Generic drug:  calcium carbonate      Take 1 chew tab by mouth as needed for heartburn        ZYRTEC ALLERGY 10 MG tablet   Generic drug:  cetirizine      Take 10 mg by mouth daily

## 2018-08-14 ENCOUNTER — OFFICE VISIT (OUTPATIENT)
Dept: OTOLARYNGOLOGY | Facility: CLINIC | Age: 63
End: 2018-08-14
Payer: COMMERCIAL

## 2018-08-14 VITALS
BODY MASS INDEX: 35.31 KG/M2 | HEART RATE: 84 BPM | SYSTOLIC BLOOD PRESSURE: 160 MMHG | DIASTOLIC BLOOD PRESSURE: 101 MMHG | TEMPERATURE: 98.4 F | WEIGHT: 275 LBS

## 2018-08-14 DIAGNOSIS — H69.92 DYSFUNCTION OF LEFT EUSTACHIAN TUBE: ICD-10-CM

## 2018-08-14 DIAGNOSIS — J34.89 RHINORRHEA: Primary | ICD-10-CM

## 2018-08-14 PROCEDURE — 31231 NASAL ENDOSCOPY DX: CPT | Performed by: OTOLARYNGOLOGY

## 2018-08-14 PROCEDURE — 99214 OFFICE O/P EST MOD 30 MIN: CPT | Mod: 25 | Performed by: OTOLARYNGOLOGY

## 2018-08-14 NOTE — LETTER
8/14/2018         RE: Jayme Selby  [address]  [place] [address]        Dear Colleague,    Thank you for referring your patient, aJyme Selby, to the [facility]. Please see a copy of my visit note below.    History of Present Illness - Jayme Selby is a 63 year old male who presents with a sensation that his ears are plugged.  He feels he may have a cerumen impaction.  In the medical history significant for sinus surgery by Dr. Sandoval in 2014 and a left tympanoplasty by Dr. He in 2015.  He denies any drainage from his ears.  He does feel he has some postnasal drainage.  He also feels he has dripping from his nose when he sits up sometimes.  This does not last more than a few seconds.  He also feels when he does irrigations the water goes into his ear.    Past Medical History -   Patient Active Problem List   Diagnosis     Tobacco use disorder     Allergic rhinitis due to pollen     Hyperlipidemia LDL goal <130     Advanced directives, counseling/discussion     Sinusitis, chronic     Allergic rhinitis due to animal dander     Seasonal allergic rhinitis     Diagnostic skin and sensitization tests (aka ALLERGENS)     Mild intermittent asthma without complication     Essential hypertension with goal blood pressure less than 140/90     Sigmoid diverticulitis       Current Medications -   Current Outpatient Prescriptions:      albuterol (PROAIR HFA/PROVENTIL HFA/VENTOLIN HFA) 108 (90 BASE) MCG/ACT Inhaler, Inhale 2 puffs into the lungs every 4 hours as needed, Disp: 1 Inhaler, Rfl: 5     ascorbic acid (VITAMIN C) 250 MG CHEW, Take 500 mg by mouth daily, Disp: , Rfl:      calcium carbonate (TUMS) 500 MG chewable tablet, Take 1 chew tab by mouth as needed for heartburn , Disp: , Rfl:      cetirizine (ZYRTEC ALLERGY) 10 MG tablet, Take 10 mg by mouth daily, Disp: , Rfl:      DM-Doxylamine-Acetaminophen (NYQUIL COLD & FLU PO), Take by mouth as needed Reported on 3/14/2017, Disp: , Rfl:       fluticasone (FLONASE) 50 MCG/ACT spray, SPRAY TWICE IN EACH NOSTRIL DAILY AS NEEDED, Disp: 3 Bottle, Rfl: 1     ofloxacin (FLOXIN) 0.3 % otic solution, Place 10 drops Into the left ear 2 times daily, Disp: 10 mL, Rfl: 5     nicotine polacrilex (COMMIT) 2 MG lozenge, Place 2 mg inside cheek every hour as needed for smoking cessation Reported on 2/20/2017, Disp: , Rfl:      ranitidine (ZANTAC 75) 75 MG tablet, Take 1 tablet by mouth daily Reported on 4/6/2017, Disp: , Rfl:     Allergies -   Allergies   Allergen Reactions     Advair Diskus Other (See Comments)     Adverse reaction per previous medical records.  Vision changes     Amlodipine Cough     Flu Like Symptoms       Lisinopril Cough     Per patient.     Losartan Cough     Flu like symptoms       Chlorthalidone Rash     Lesions on Face  Chest Pressure         Social History -   Social History     Social History     Marital status:      Spouse name: N/A     Number of children: N/A     Years of education: N/A     Social History Main Topics     Smoking status: Light Tobacco Smoker     Packs/day: 0.50     Years: 40.00     Types: Cigarettes     Smokeless tobacco: Never Used      Comment: occassional cig     Alcohol use Yes      Comment: 5-8 drinks 1 day per week     Drug use: No     Sexual activity: No     Other Topics Concern     Parent/Sibling W/ Cabg, Mi Or Angioplasty Before 65f 55m? Yes     Social History Narrative    March 10, 2017        ENVIRONMENTAL HISTORY: [place] is heated with a forced air and gas furnace. They does have central air conditioning. The patient's bedroom is furnished with carpeting in bedroom, allergen mattress cover, allergen pillowcase cover and fabric window coverings. No pets inside the house. There is not history of cockroach or mice infestation. There are 2 smokers in the house.  The house does not have a basement.            Family History -   Family History   Problem  Relation Age of Onset     Diabetes Mother      LUNG DISEASE Mother      Hypertension Father      C.A.D. Father      Hyperlipidemia Father      Seasonal/Environmental Allergies Sister        Review of Systems - As per HPI and PMHx, otherwise 7 system review of the head and neck negative. 10+ system review negative.    Exam:  BP (!) 160/101 (BP Location: Right arm, Patient Position: Chair, Cuff Size: Adult Large)  Pulse 84  Temp 98.4  F (36.9  C) (Oral)  Wt 124.7 kg (275 lb)  BMI 35.31 kg/m2  General - The patient is well nourished and well developed, and appears to have good nutritional status.  Alert and oriented to person and place, answers questions and cooperates with examination appropriately.   Head and Face - Normocephalic and atraumatic, with no gross asymmetry noted of the contour of the facial features.  The facial nerve is intact, with strong symmetric movements.  Eyes - Extraocular movements intact.  Sclera were not icteric or injected, conjunctiva were pink and moist.  Ears - bilateral TMs intact, no middle ear effusion. Normal autoinsufflation.  Nose - patent nasal airway bilaterally.    To further evaluate the nasal cavity, I performed rigid nasal endoscopy.  I first sprayed the nasal cavity bilaterally with a mix of lidocaine and neosynephrine.  I then began on the left side using a 2.7mm, 30 degree rigid nasal endoscope.  The septum was fairly straight and the nasal airway was open.  No abnormal secretions, purulence, or polyps were noted. The left middle turbinate and middle meatus were clearly visualized and normal in appearance. There was minimal access to the middle meatus, and no clear evidence of his prior surgery.  Looking up, the olfactory cleft was unobstructed.  Going further back, the sphenoethmoid recess was normal in appearance, with healthy appearing mucosa on the face of the sphenoid.  The nasopharynx was unremarkable, and the eustachian tube opening on this side was  unobstructed.    I then turned my attention to the right side.  Once again, the septum was fairly straight, and the airway was open.  No abnormal secretions, purulence, polyps were noted.  The right middle turbinate and middle meatus were clearly visualized and normal in appearance. There was minimal access to the middle meatus, and no clear evidence of his prior surgery.   Looking up, the olfactory cleft was unobstructed.  Going further back the right sphenoethmoid recess was normal in appearance, and eustachian tube opening was unobstructed.          A/P - Jayme Selby is a 63 year old male with chronic left Eustachian tube dysfunction but able to autoinsufflate well today. I reassured him that he did not have any sign of effusion.  His nasal exam also fails to demonstrate any evidence of acute sinusitis.  There is also no evidence of nasal polyposis.  I reassured him that his nose appears to be in reasonable condition.  I recommended he irrigate his nose maybe once per day especially if he feels the water is getting washing his ears after irrigating.      Dr. Merry Stanton MD  Otolaryngology  [facility]      Again, thank you for allowing me to participate in the care of your patient.        Sincerely,        Merry Stanton MD

## 2018-08-14 NOTE — MR AVS SNAPSHOT
After Visit Summary   8/14/2018    Jayme Selby    MRN: [id number]           Patient Information     Date Of Birth          1955        Visit Information        Provider Department      8/14/2018 2:45 PM Merry Stanton MD [facility]        Today's Diagnoses     Rhinorrhea    -  1    Dysfunction of left eustachian tube           Follow-ups after your visit        Who to contact     If you have questions or need follow up information about today's clinic visit or your schedule please contact [facility] directly at [phone].  Normal or non-critical lab and imaging results will be communicated to you by MyChart, letter or phone within 4 business days after the clinic has received the results. If you do not hear from us within 7 days, please contact the clinic through MyChart or phone. If you have a critical or abnormal lab result, we will notify you by phone as soon as possible.  Submit refill requests through [employer] or call your pharmacy and they will forward the refill request to us. Please allow 3 business days for your refill to be completed.          Additional Information About Your Visit        Care EveryWhere ID     This is your Care EveryWhere ID. This could be used by other organizations to access your [facility] medical records  [id number]        Your Vitals Were     Pulse Temperature BMI (Body Mass Index)             84 98.4  F (36.9  C) (Oral) 35.31 kg/m2          Blood Pressure from Last 3 Encounters:   08/14/18 (!) 160/101   06/12/18 (!) 154/106   06/03/18 (!) 180/111    Weight from Last 3 Encounters:   08/14/18 124.7 kg (275 lb)   06/12/18 122.5 kg (270 lb)   06/03/18 120.2 kg (265 lb)              We Performed the Following     NASAL ENDOSCOPY, DIAGNOSTIC        Primary Care Provider Office Phone # Fax #    R Brett Aceves [phone] [address] [facility] [address]        Equal Access to Services     PASCALE JACKSON AH:  Hadii aad julio petero Soenmanuelali, waaxda luqadaha, qaybta kaalmada penny, asya frankin hayaabaltazar nuneskris martaelian la'jadebaltazar lisa. So [facility] [phone].    ATENCIÓN: Si thomas hill, tiene a shelley disposición servicios gratuitos de asistencia lingüística. Llame al [phone].    We comply with applicable federal civil rights laws and [place] laws. We do not discriminate on the basis of race, color, national origin, age, disability, sex, sexual orientation, or gender identity.            Thank you!     Thank you for choosing [facility]  for your care. Our goal is always to provide you with excellent care. Hearing back from our patients is one way we can continue to improve our services. Please take a few minutes to complete the written survey that you may receive in the mail after your visit with us. Thank you!             Your Updated Medication List - Protect others around you: Learn how to safely use, store and throw away your medicines at www.disposemymeds.org.          This list is accurate as of 8/14/18 11:59 PM.  Always use your most recent med list.                   Brand Name Dispense Instructions for use Diagnosis    albuterol 108 (90 Base) MCG/ACT inhaler    PROAIR HFA/PROVENTIL HFA/VENTOLIN HFA    1 Inhaler    Inhale 2 puffs into the lungs every 4 hours as needed    Mild intermittent asthma without complication       ascorbic acid 250 MG Chew chewable tablet    vitamin C     Take 500 mg by mouth daily        fluticasone 50 MCG/ACT spray    FLONASE    3 Bottle    SPRAY TWICE IN EACH NOSTRIL DAILY AS NEEDED    Seasonal allergic rhinitis       nicotine polacrilex 2 MG lozenge    COMMIT     Place 2 mg inside cheek every hour as needed for smoking cessation Reported on 2/20/2017        NYQUIL COLD & FLU PO      Take by mouth as needed Reported on 3/14/2017        ofloxacin 0.3 % otic solution    FLOXIN    10 mL    Place 10 drops Into the left ear 2 times daily    Acute diffuse otitis externa of left ear        ranitidine 75 MG tablet   Generic drug:  ranitidine      Take 1 tablet by mouth daily Reported on 4/6/2017        TUMS 500 MG chewable tablet   Generic drug:  calcium carbonate      Take 1 chew tab by mouth as needed for heartburn        ZYRTEC ALLERGY 10 MG tablet   Generic drug:  cetirizine      Take 10 mg by mouth daily

## 2018-08-14 NOTE — PROGRESS NOTES
History of Present Illness - Jayme Selby is a 63 year old male who presents with a sensation that his ears are plugged.  He feels he may have a cerumen impaction.  In the medical history significant for sinus surgery by Dr. Sandoval in 2014 and a left tympanoplasty by Dr. He in 2015.  He denies any drainage from his ears.  He does feel he has some postnasal drainage.  He also feels he has dripping from his nose when he sits up sometimes.  This does not last more than a few seconds.  He also feels when he does irrigations the water goes into his ear.    Past Medical History -   Patient Active Problem List   Diagnosis     Tobacco use disorder     Allergic rhinitis due to pollen     Hyperlipidemia LDL goal <130     Advanced directives, counseling/discussion     Sinusitis, chronic     Allergic rhinitis due to animal dander     Seasonal allergic rhinitis     Diagnostic skin and sensitization tests (aka ALLERGENS)     Mild intermittent asthma without complication     Essential hypertension with goal blood pressure less than 140/90     Sigmoid diverticulitis       Current Medications -   Current Outpatient Prescriptions:      albuterol (PROAIR HFA/PROVENTIL HFA/VENTOLIN HFA) 108 (90 BASE) MCG/ACT Inhaler, Inhale 2 puffs into the lungs every 4 hours as needed, Disp: 1 Inhaler, Rfl: 5     ascorbic acid (VITAMIN C) 250 MG CHEW, Take 500 mg by mouth daily, Disp: , Rfl:      calcium carbonate (TUMS) 500 MG chewable tablet, Take 1 chew tab by mouth as needed for heartburn , Disp: , Rfl:      cetirizine (ZYRTEC ALLERGY) 10 MG tablet, Take 10 mg by mouth daily, Disp: , Rfl:      DM-Doxylamine-Acetaminophen (NYQUIL COLD & FLU PO), Take by mouth as needed Reported on 3/14/2017, Disp: , Rfl:      fluticasone (FLONASE) 50 MCG/ACT spray, SPRAY TWICE IN EACH NOSTRIL DAILY AS NEEDED, Disp: 3 Bottle, Rfl: 1     ofloxacin (FLOXIN) 0.3 % otic solution, Place 10 drops Into the left ear 2 times daily, Disp: 10 mL, Rfl: 5      nicotine polacrilex (COMMIT) 2 MG lozenge, Place 2 mg inside cheek every hour as needed for smoking cessation Reported on 2/20/2017, Disp: , Rfl:      ranitidine (ZANTAC 75) 75 MG tablet, Take 1 tablet by mouth daily Reported on 4/6/2017, Disp: , Rfl:     Allergies -   Allergies   Allergen Reactions     Advair Diskus Other (See Comments)     Adverse reaction per previous medical records.  Vision changes     Amlodipine Cough     Flu Like Symptoms       Lisinopril Cough     Per patient.     Losartan Cough     Flu like symptoms       Chlorthalidone Rash     Lesions on Face  Chest Pressure         Social History -   Social History     Social History     Marital status:      Spouse name: N/A     Number of children: N/A     Years of education: N/A     Social History Main Topics     Smoking status: Light Tobacco Smoker     Packs/day: 0.50     Years: 40.00     Types: Cigarettes     Smokeless tobacco: Never Used      Comment: occassional cig     Alcohol use Yes      Comment: 5-8 drinks 1 day per week     Drug use: No     Sexual activity: No     Other Topics Concern     Parent/Sibling W/ Cabg, Mi Or Angioplasty Before 65f 55m? Yes     Social History Narrative    March 10, 2017        ENVIRONMENTAL HISTORY: [place] is heated with a forced air and gas furnace. They does have central air conditioning. The patient's bedroom is furnished with carpeting in bedroom, allergen mattress cover, allergen pillowcase cover and fabric window coverings. No pets inside the house. There is not history of cockroach or mice infestation. There are 2 smokers in the house.  The house does not have a basement.            Family History -   Family History   Problem Relation Age of Onset     Diabetes Mother      LUNG DISEASE Mother      Hypertension Father      C.A.D. Father      Hyperlipidemia Father      Seasonal/Environmental Allergies Sister        Review of Systems - As per HPI and  PMHx, otherwise 7 system review of the head and neck negative. 10+ system review negative.    Exam:  BP (!) 160/101 (BP Location: Right arm, Patient Position: Chair, Cuff Size: Adult Large)  Pulse 84  Temp 98.4  F (36.9  C) (Oral)  Wt 124.7 kg (275 lb)  BMI 35.31 kg/m2  General - The patient is well nourished and well developed, and appears to have good nutritional status.  Alert and oriented to person and place, answers questions and cooperates with examination appropriately.   Head and Face - Normocephalic and atraumatic, with no gross asymmetry noted of the contour of the facial features.  The facial nerve is intact, with strong symmetric movements.  Eyes - Extraocular movements intact.  Sclera were not icteric or injected, conjunctiva were pink and moist.  Ears - bilateral TMs intact, no middle ear effusion. Normal autoinsufflation.  Nose - patent nasal airway bilaterally.    To further evaluate the nasal cavity, I performed rigid nasal endoscopy.  I first sprayed the nasal cavity bilaterally with a mix of lidocaine and neosynephrine.  I then began on the left side using a 2.7mm, 30 degree rigid nasal endoscope.  The septum was fairly straight and the nasal airway was open.  No abnormal secretions, purulence, or polyps were noted. The left middle turbinate and middle meatus were clearly visualized and normal in appearance. There was minimal access to the middle meatus, and no clear evidence of his prior surgery.  Looking up, the olfactory cleft was unobstructed.  Going further back, the sphenoethmoid recess was normal in appearance, with healthy appearing mucosa on the face of the sphenoid.  The nasopharynx was unremarkable, and the eustachian tube opening on this side was unobstructed.    I then turned my attention to the right side.  Once again, the septum was fairly straight, and the airway was open.  No abnormal secretions, purulence, polyps were noted.  The right middle turbinate and middle meatus were  clearly visualized and normal in appearance. There was minimal access to the middle meatus, and no clear evidence of his prior surgery.   Looking up, the olfactory cleft was unobstructed.  Going further back the right sphenoethmoid recess was normal in appearance, and eustachian tube opening was unobstructed.          A/P - Jayme Selby is a 63 year old male with chronic left Eustachian tube dysfunction but able to autoinsufflate well today. I reassured him that he did not have any sign of effusion.  His nasal exam also fails to demonstrate any evidence of acute sinusitis.  There is also no evidence of nasal polyposis.  I reassured him that his nose appears to be in reasonable condition.  I recommended he irrigate his nose maybe once per day especially if he feels the water is getting washing his ears after irrigating.      Dr. Merry Stanton MD  Otolaryngology  [facility]

## 2018-08-14 NOTE — NURSING NOTE
"Chief Complaint   Patient presents with     RECHECK     Wants ears checked and cleaned. Had left tube removed about a year ago.       Initial BP (!) 160/101 (BP Location: Right arm, Patient Position: Chair, Cuff Size: Adult Large)  Pulse 84  Temp 98.4  F (36.9  C) (Oral)  Wt 124.7 kg (275 lb)  BMI 35.31 kg/m2 Estimated body mass index is 35.31 kg/(m^2) as calculated from the following:    Height as of 6/12/18: 1.88 m (6' 2\").    Weight as of this encounter: 124.7 kg (275 lb).  Medications and allergies reviewed.    Highly suggested to patient to follow up with PCP or go over to the Urgent Care today to have his high blood pressure address and taken care of. Patient refused and said that \"he does not want to take medication that is going to make him break out, or he would rather die before having to take more medication\".    This laryngoscopy scope was  used on this patient.    Rigid    Scope Number: Pankaj Storz Rigid    # [id number] Adult/Post-op sinus    Susie COBURN, ANGELLA    "

## 2018-09-13 DIAGNOSIS — J45.20 MILD INTERMITTENT ASTHMA WITHOUT COMPLICATION: ICD-10-CM

## 2018-09-13 RX ORDER — ALBUTEROL SULFATE 90 UG/1
AEROSOL, METERED RESPIRATORY (INHALATION)
Qty: 1 INHALER | Refills: 0 | Status: SHIPPED | OUTPATIENT
Start: 2018-09-13 | End: 2019-04-19

## 2018-09-13 NOTE — TELEPHONE ENCOUNTER
"Requested Prescriptions   Pending Prescriptions Disp Refills     PROAIR  (90 Base) MCG/ACT inhaler [Pharmacy Med Name: PROAIR HFA ORAL INH (200  PFS) 8.5G] 8.5 g 0     Sig: INHALE 2 PUFFS INTO THE LUNGS EVERY 4 HOURS AS NEEDED    Asthma Maintenance Inhalers - Anticholinergics Failed    9/13/2018 12:27 PM       Failed - Asthma control assessment score within normal limits in last 6 months    Please review ACT score.          Passed - Patient is age 12 years or older       Passed - Recent (6 mo) or future (30 days) visit within the authorizing provider's specialty    Patient had office visit in the last 6 months or has a visit in the next 30 days with authorizing provider or within the authorizing provider's specialty.  See \"Patient Info\" tab in inbasket, or \"Choose Columns\" in Meds & Orders section of the refill encounter.            ACT Total Scores 7/7/2016 2/17/2017 3/14/2018   ACT TOTAL SCORE - - -   ASTHMA ER VISITS - - -   ASTHMA HOSPITALIZATIONS - - -   ACT TOTAL SCORE (Goal Greater than or Equal to 20) 24 24 25   In the past 12 months, how many times did you visit the emergency room for your asthma without being admitted to the hospital? 0 0 0   In the past 12 months, how many times were you hospitalized overnight because of your asthma? 0 0 0   Prescription approved per [facility] Refill Protocol.    "

## 2019-03-21 ENCOUNTER — HOSPITAL ENCOUNTER (EMERGENCY)
Facility: CLINIC | Age: 64
Discharge: HOME | End: 2019-03-21
Attending: NURSE PRACTITIONER | Admitting: NURSE PRACTITIONER
Payer: COMMERCIAL

## 2019-03-21 VITALS
HEART RATE: 78 BPM | TEMPERATURE: 99 F | DIASTOLIC BLOOD PRESSURE: 82 MMHG | SYSTOLIC BLOOD PRESSURE: 162 MMHG | OXYGEN SATURATION: 97 %

## 2019-03-21 DIAGNOSIS — J10.1 INFLUENZA A: ICD-10-CM

## 2019-03-21 LAB
FLUAV AG UPPER RESP QL IA.RAPID: POSITIVE
FLUBV AG UPPER RESP QL IA.RAPID: NEGATIVE
INTERNAL QC OK POCT: YES

## 2019-03-21 PROCEDURE — G0463 HOSPITAL OUTPT CLINIC VISIT: HCPCS | Performed by: NURSE PRACTITIONER

## 2019-03-21 PROCEDURE — 99214 OFFICE O/P EST MOD 30 MIN: CPT | Mod: Z6 | Performed by: NURSE PRACTITIONER

## 2019-03-21 PROCEDURE — 87804 INFLUENZA ASSAY W/OPTIC: CPT | Performed by: NURSE PRACTITIONER

## 2019-03-21 RX ORDER — OSELTAMIVIR PHOSPHATE 75 MG/1
75 CAPSULE ORAL 2 TIMES DAILY
Qty: 10 CAPSULE | Refills: 0 | Status: SHIPPED | OUTPATIENT
Start: 2019-03-21 | End: 2019-03-26

## 2019-03-21 ASSESSMENT — ENCOUNTER SYMPTOMS
HEADACHES: 1
SORE THROAT: 1
FEVER: 1
COUGH: 1
WHEEZING: 0
VOMITING: 0
FATIGUE: 1
SHORTNESS OF BREATH: 0
NAUSEA: 0
MYALGIAS: 1
CHILLS: 1

## 2019-03-21 NOTE — ED PROVIDER NOTES
History     Chief Complaint   Patient presents with     Generalized Body Aches     body aches, cough, low grade fever     HPI  Jayme Selby is a 64 year old male with history of asthma, htn, hyperlipidemia, and tobacco use who presents to urgent care for evaluation of cough, fevers, and body aches. Symptoms started 5 days ago. No chest pain or shortness of breath.     Allergies:  Allergies   Allergen Reactions     Advair Diskus Other (See Comments)     Adverse reaction per previous medical records.  Vision changes     Amlodipine Cough     Flu Like Symptoms       Lisinopril Cough     Per patient.     Losartan Cough     Flu like symptoms       Chlorthalidone Rash     Lesions on Face  Chest Pressure         Problem List:    Patient Active Problem List    Diagnosis Date Noted     Sigmoid diverticulitis 06/28/2016     Priority: Medium     Essential hypertension with goal blood pressure less than 140/90 06/23/2016     Priority: Medium     Mild intermittent asthma without complication 10/27/2015     Priority: Medium     Diagnosis updated by automated process. Provider to review and confirm.       Allergic rhinitis due to animal dander      Priority: Medium     12/5/14 IgE tests pos. only for cat/dog/T (all other environmental allergens NEGATIVE)       Seasonal allergic rhinitis      Priority: Medium     Diagnostic skin and sensitization tests (aka ALLERGENS)      Priority: Medium     Sinusitis, chronic 10/23/2014     Priority: Medium     Advanced directives, counseling/discussion 04/22/2014     Priority: Medium     Patient does not have an Advance/Health Care Directive (HCD), declines information/referral.    Gracy Byers  April 22, 2014         Hyperlipidemia LDL goal <130 02/25/2014     Priority: Medium     Score not calculated. Missing: Total Cholesterol, HDL  Based history of hypertension and smoking and age has greater than 2 risk factors for CHD.       Tobacco use disorder 02/20/2014     Priority: Medium      Allergic rhinitis due to pollen 02/20/2014     Priority: Medium        Past Medical History:    Past Medical History:   Diagnosis Date     Allergic rhinitis due to animal dander      Diagnostic skin and sensitization tests (aka ALLERGENS) 12/5/14 IgE tests pos. only for cat/dog/T (all other environmental allergens NEGATIVE)     GERD (gastroesophageal reflux disease)      H. pylori infection 1999     Seasonal allergic rhinitis        Past Surgical History:    Past Surgical History:   Procedure Laterality Date     CHOLECYSTECTOMY  09/1999     ETHMOIDECTOMY  5/7/2014    Procedure: ETHMOIDECTOMY;  Surgeon: Kai Sandoval MD;  Location: [facility] OR     ETHMOIDECTOMY  6/4/2014    Procedure: ETHMOIDECTOMY;  Surgeon: Kai Sandoval MD;  Location: [facility] OR     TONSILLECTOMY       TYMPANOTOMY EXPLORATORY Left 1/29/2015    Procedure: TYMPANOTOMY EXPLORATORY;  Surgeon: Michel He MD;  Location:  OR       Family History:    Family History   Problem Relation Age of Onset     Diabetes Mother      LUNG DISEASE Mother      Hypertension Father      C.A.D. Father      Hyperlipidemia Father      Seasonal/Environmental Allergies Sister        Social History:  Marital Status:   [2]  Social History     Tobacco Use     Smoking status: Current Every Day Smoker     Packs/day: 0.50     Years: 40.00     Pack years: 20.00     Types: Cigarettes     Smokeless tobacco: Never Used     Tobacco comment: smokes natural cigerettes not in box   Substance Use Topics     Alcohol use: Yes     Comment: 5-8 drinks 1 day per week     Drug use: No        Medications:      oseltamivir (TAMIFLU) 75 MG capsule   ascorbic acid (VITAMIN C) 250 MG CHEW   calcium carbonate (TUMS) 500 MG chewable tablet   cetirizine (ZYRTEC ALLERGY) 10 MG tablet   DM-Doxylamine-Acetaminophen (NYQUIL COLD & FLU PO)   fluticasone (FLONASE) 50 MCG/ACT spray   nicotine polacrilex (COMMIT) 2 MG lozenge   ofloxacin (FLOXIN) 0.3 % otic solution   PROAIR  (90  Base) MCG/ACT inhaler   ranitidine (ZANTAC 75) 75 MG tablet         Review of Systems   Constitutional: Positive for chills, fatigue and fever.   HENT: Positive for congestion and sore throat. Negative for ear pain.    Respiratory: Positive for cough. Negative for shortness of breath and wheezing.    Cardiovascular: Negative for chest pain.   Gastrointestinal: Negative for nausea and vomiting.   Musculoskeletal: Positive for myalgias.   Skin: Negative for rash.   Neurological: Positive for headaches.       Physical Exam   BP: 162/82  Pulse: 78  Temp: 99  F (37.2  C)  SpO2: 97 %      Physical Exam    GENERAL APPEARANCE: healthy, alert, ill-appearing but not toxic.   EYES: EOMI, conjunctiva clear  HENT: bilateral ear canals clear, intact, and without inflammation. Right TM normal. Left TM normal. Nose normal.  Oropharynx without ulcers, erythema or lesions  NECK: supple, nontender, no lymphadenopathy  RESP: lungs clear to auscultation - no rales, rhonchi or wheezes  CV: regular rates and rhythm, normal S1 S2, no murmur noted      ED Course        Procedures             Results for orders placed or performed during the hospital encounter of 03/21/19 (from the past 24 hour(s))   Influenza A/B antigen POCT   Result Value Ref Range    Influenza A positive neg    Influenza B negative neg    Internal QC OK Yes        Medications - No data to display    Assessments & Plan (with Medical Decision Making)   Positive for influenza A. Lung sounds CTA. No tachypnea. No hypoxia. Patient has been ill for more than 48 hours but given his comorbidity (asthma) will treat with tamiflu.  Worrisome reasons to recheck discussed.      I have reviewed the nursing notes.    I have reviewed the findings, diagnosis, plan and need for follow up with the patient.       Medication List      Started    oseltamivir 75 MG capsule  Commonly known as:  TAMIFLU  75 mg, Oral, 2 TIMES DAILY            Final diagnoses:   Influenza A       3/21/2019    [facility] EMERGENCY DEPARTMENT     Micaela Demarco APRN CNP  03/21/19 [id number]

## 2019-03-21 NOTE — ED AVS SNAPSHOT
[facility] Emergency Department  [address] [address]  Phone:  [phone]  Fax:  [phone]                                    Jayme Selby   MRN: [id number]    Department:  [facility] Emergency Department   Date of Visit:  3/21/2019           After Visit Summary Signature Page    I have received my discharge instructions, and my questions have been answered. I have discussed any challenges I see with this plan with the nurse or doctor.    ..........................................................................................................................................  Patient/Patient Representative Signature      ..........................................................................................................................................  Patient Representative Print Name and Relationship to Patient    ..................................................               ................................................  Date                                   Time    ..........................................................................................................................................  Reviewed by Signature/Title    ...................................................              ..............................................  Date                                               Time          22EPIC Rev 08/18

## 2019-03-21 NOTE — DISCHARGE INSTRUCTIONS
Tamiflu 75 mg twice daily for 5 days.  Rest.  Drink plenty of fluids.  Tylenol or Ibuprofen for fever/body aches.

## 2019-04-08 ENCOUNTER — HOSPITAL ENCOUNTER (EMERGENCY)
Facility: CLINIC | Age: 64
Discharge: HOME | End: 2019-04-08
Attending: FAMILY MEDICINE | Admitting: FAMILY MEDICINE
Payer: COMMERCIAL

## 2019-04-08 VITALS
RESPIRATION RATE: 16 BRPM | SYSTOLIC BLOOD PRESSURE: 168 MMHG | OXYGEN SATURATION: 96 % | WEIGHT: 270 LBS | BODY MASS INDEX: 34.67 KG/M2 | TEMPERATURE: 98 F | DIASTOLIC BLOOD PRESSURE: 116 MMHG | HEART RATE: 85 BPM

## 2019-04-08 DIAGNOSIS — R10.9 ABDOMINAL WALL PAIN: ICD-10-CM

## 2019-04-08 LAB
ALBUMIN SERPL-MCNC: 3.7 G/DL (ref 3.4–5)
ALP SERPL-CCNC: 92 U/L (ref 40–150)
ALT SERPL W P-5'-P-CCNC: 33 U/L (ref 0–70)
ANION GAP SERPL CALCULATED.3IONS-SCNC: 4 MMOL/L (ref 3–14)
AST SERPL W P-5'-P-CCNC: 20 U/L (ref 0–45)
BASOPHILS # BLD AUTO: 0 10E9/L (ref 0–0.2)
BASOPHILS NFR BLD AUTO: 0.2 %
BILIRUB SERPL-MCNC: 0.4 MG/DL (ref 0.2–1.3)
BUN SERPL-MCNC: 14 MG/DL (ref 7–30)
CALCIUM SERPL-MCNC: 8.5 MG/DL (ref 8.5–10.1)
CHLORIDE SERPL-SCNC: 109 MMOL/L (ref 94–109)
CO2 SERPL-SCNC: 26 MMOL/L (ref 20–32)
CREAT SERPL-MCNC: 1.1 MG/DL (ref 0.66–1.25)
CRP SERPL-MCNC: 7.8 MG/L (ref 0–8)
DIFFERENTIAL METHOD BLD: NORMAL
EOSINOPHIL # BLD AUTO: 0.1 10E9/L (ref 0–0.7)
EOSINOPHIL NFR BLD AUTO: 0.7 %
ERYTHROCYTE [DISTWIDTH] IN BLOOD BY AUTOMATED COUNT: 12.9 % (ref 10–15)
GFR SERPL CREATININE-BSD FRML MDRD: 71 ML/MIN/{1.73_M2}
GLUCOSE SERPL-MCNC: 96 MG/DL (ref 70–99)
HCT VFR BLD AUTO: 46.5 % (ref 40–53)
HGB BLD-MCNC: 15.8 G/DL (ref 13.3–17.7)
IMM GRANULOCYTES # BLD: 0 10E9/L (ref 0–0.4)
IMM GRANULOCYTES NFR BLD: 0.5 %
LACTATE BLD-SCNC: 1 MMOL/L (ref 0.7–2)
LIPASE SERPL-CCNC: 106 U/L (ref 73–393)
LYMPHOCYTES # BLD AUTO: 1.3 10E9/L (ref 0.8–5.3)
LYMPHOCYTES NFR BLD AUTO: 15.2 %
MCH RBC QN AUTO: 29.4 PG (ref 26.5–33)
MCHC RBC AUTO-ENTMCNC: 34 G/DL (ref 31.5–36.5)
MCV RBC AUTO: 86 FL (ref 78–100)
MONOCYTES # BLD AUTO: 0.6 10E9/L (ref 0–1.3)
MONOCYTES NFR BLD AUTO: 7.5 %
NEUTROPHILS # BLD AUTO: 6.4 10E9/L (ref 1.6–8.3)
NEUTROPHILS NFR BLD AUTO: 75.9 %
NRBC # BLD AUTO: 0 10*3/UL
NRBC BLD AUTO-RTO: 0 /100
PLATELET # BLD AUTO: 247 10E9/L (ref 150–450)
POTASSIUM SERPL-SCNC: 3.9 MMOL/L (ref 3.4–5.3)
PROT SERPL-MCNC: 7.3 G/DL (ref 6.8–8.8)
RBC # BLD AUTO: 5.38 10E12/L (ref 4.4–5.9)
SODIUM SERPL-SCNC: 139 MMOL/L (ref 133–144)
WBC # BLD AUTO: 8.4 10E9/L (ref 4–11)

## 2019-04-08 PROCEDURE — 36415 COLL VENOUS BLD VENIPUNCTURE: CPT | Performed by: FAMILY MEDICINE

## 2019-04-08 PROCEDURE — 83605 ASSAY OF LACTIC ACID: CPT | Performed by: FAMILY MEDICINE

## 2019-04-08 PROCEDURE — 99283 EMERGENCY DEPT VISIT LOW MDM: CPT | Performed by: FAMILY MEDICINE

## 2019-04-08 PROCEDURE — 99284 EMERGENCY DEPT VISIT MOD MDM: CPT | Mod: Z6 | Performed by: FAMILY MEDICINE

## 2019-04-08 PROCEDURE — 83690 ASSAY OF LIPASE: CPT | Performed by: FAMILY MEDICINE

## 2019-04-08 PROCEDURE — 86140 C-REACTIVE PROTEIN: CPT | Performed by: FAMILY MEDICINE

## 2019-04-08 PROCEDURE — 80053 COMPREHEN METABOLIC PANEL: CPT | Performed by: FAMILY MEDICINE

## 2019-04-08 PROCEDURE — 85025 COMPLETE CBC W/AUTO DIFF WBC: CPT | Performed by: FAMILY MEDICINE

## 2019-04-08 RX ORDER — CYCLOBENZAPRINE HCL 10 MG
10 TABLET ORAL 3 TIMES DAILY PRN
Qty: 20 TABLET | Refills: 0 | Status: SHIPPED | OUTPATIENT
Start: 2019-04-08 | End: 2019-04-14

## 2019-04-08 RX ORDER — SODIUM CHLORIDE, SODIUM LACTATE, POTASSIUM CHLORIDE, CALCIUM CHLORIDE 600; 310; 30; 20 MG/100ML; MG/100ML; MG/100ML; MG/100ML
1000 INJECTION, SOLUTION INTRAVENOUS CONTINUOUS
Status: DISCONTINUED | OUTPATIENT
Start: 2019-04-08 | End: 2019-04-08 | Stop reason: HOSPADM

## 2019-04-08 RX ORDER — ONDANSETRON 2 MG/ML
4 INJECTION INTRAMUSCULAR; INTRAVENOUS
Status: DISCONTINUED | OUTPATIENT
Start: 2019-04-08 | End: 2019-04-08 | Stop reason: HOSPADM

## 2019-04-08 NOTE — ED NOTES
"Pt here with complaints of bilateral flank \"cramping\". The patient states it is intermittent, and feels like a muscle cramp. The patient denies nausea, vomiting or diarrhea. He states that the pain started on day 3 of taking the tamiflu medication. The patient states the pain has been going on for 2 weeks.   "

## 2019-04-08 NOTE — ED AVS SNAPSHOT
[facility] Emergency Department  [address] [address]  Phone:  [phone]  Fax:  [phone]                                    Jayme Selby   MRN: [id number]    Department:  [facility] Emergency Department   Date of Visit:  4/8/2019           After Visit Summary Signature Page    I have received my discharge instructions, and my questions have been answered. I have discussed any challenges I see with this plan with the nurse or doctor.    ..........................................................................................................................................  Patient/Patient Representative Signature      ..........................................................................................................................................  Patient Representative Print Name and Relationship to Patient    ..................................................               ................................................  Date                                   Time    ..........................................................................................................................................  Reviewed by Signature/Title    ...................................................              ..............................................  Date                                               Time          22EPIC Rev 08/18

## 2019-04-08 NOTE — ED PROVIDER NOTES
History     Chief Complaint   Patient presents with     Abdominal Pain     generalized abd pain, sx for 1 week, pt states it began when taking tamiflu     HPI  Jayme Selby is a 64 year old male, has medical history is significant for sigmoid diverticulitis, hypertension, asthma, allergic rhinitis, hyperlipidemia, tobacco use disorder, presents to the emergency department concerns of approximately 10 days of abdominal pain.  History is obtained from the patient who states that he had influenza a couple of weeks ago and took a full course of Tamiflu.  Towards the tail end of the Tamiflu he began with a spasming type bilateral costal margin area discomfort as well as flank muscle spasm sensation as well.  It has been going on every day since then for the last 10 days and not associate with nausea or vomiting, no change in bowel habit, and is driving him crazy.  He states that is why he came to the emergency department today.  He notes no fever chills or sweats.  He has no difficulties passing urine and denies urine frequency urgency or dysuria.  States that he had similar symptoms with taking blood pressure medication and that is why he does not treat his blood pressure.      Allergies:  Allergies   Allergen Reactions     Advair Diskus Other (See Comments)     Adverse reaction per previous medical records.  Vision changes     Amlodipine Cough     Flu Like Symptoms       Lisinopril Cough     Per patient.     Losartan Cough     Flu like symptoms       Chlorthalidone Other (See Comments) and Rash     Lesions on Face, Chest Pressure         Problem List:    Patient Active Problem List    Diagnosis Date Noted     Sigmoid diverticulitis 06/28/2016     Priority: Medium     Essential hypertension with goal blood pressure less than 140/90 06/23/2016     Priority: Medium     Mild intermittent asthma without complication 10/27/2015     Priority: Medium     Diagnosis updated by automated process. Provider to review and  confirm.       Allergic rhinitis due to animal dander      Priority: Medium     12/5/14 IgE tests pos. only for cat/dog/T (all other environmental allergens NEGATIVE)       Seasonal allergic rhinitis      Priority: Medium     Diagnostic skin and sensitization tests (aka ALLERGENS)      Priority: Medium     Sinusitis, chronic 10/23/2014     Priority: Medium     Advanced directives, counseling/discussion 04/22/2014     Priority: Medium     Patient does not have an Advance/Health Care Directive (HCD), declines information/referral.    Gracy Byers  April 22, 2014         Hyperlipidemia LDL goal <130 02/25/2014     Priority: Medium     Score not calculated. Missing: Total Cholesterol, HDL  Based history of hypertension and smoking and age has greater than 2 risk factors for CHD.       Tobacco use disorder 02/20/2014     Priority: Medium     Allergic rhinitis due to pollen 02/20/2014     Priority: Medium        Past Medical History:    Past Medical History:   Diagnosis Date     Allergic rhinitis due to animal dander      Diagnostic skin and sensitization tests (aka ALLERGENS) 12/5/14 IgE tests pos. only for cat/dog/T (all other environmental allergens NEGATIVE)     GERD (gastroesophageal reflux disease)      H. pylori infection 1999     Seasonal allergic rhinitis        Past Surgical History:    Past Surgical History:   Procedure Laterality Date     CHOLECYSTECTOMY  09/1999     ETHMOIDECTOMY  5/7/2014    Procedure: ETHMOIDECTOMY;  Surgeon: Kai Sandoval MD;  Location: [facility]     ETHMOIDECTOMY  6/4/2014    Procedure: ETHMOIDECTOMY;  Surgeon: Kai Sandoval MD;  Location: [facility]     TONSILLECTOMY       TYMPANOTOMY EXPLORATORY Left 1/29/2015    Procedure: TYMPANOTOMY EXPLORATORY;  Surgeon: Michel He MD;  Location:  OR       Family History:    Family History   Problem Relation Age of Onset     Diabetes Mother      LUNG DISEASE Mother      Hypertension Father      C.A.D. Father       Hyperlipidemia Father      Seasonal/Environmental Allergies Sister        Social History:  Marital Status:   [2]  Social History     Tobacco Use     Smoking status: Current Every Day Smoker     Packs/day: 0.50     Years: 40.00     Pack years: 20.00     Types: Cigarettes     Smokeless tobacco: Never Used     Tobacco comment: smokes natural cigerettes not in box   Substance Use Topics     Alcohol use: Yes     Comment: 5-8 drinks 1 day per week     Drug use: No        Medications:      ascorbic acid (VITAMIN C) 250 MG CHEW   calcium carbonate (TUMS) 500 MG chewable tablet   cetirizine (ZYRTEC ALLERGY) 10 MG tablet   cyclobenzaprine (FLEXERIL) 10 MG tablet   DM-Doxylamine-Acetaminophen (NYQUIL COLD & FLU PO)   fluticasone (FLONASE) 50 MCG/ACT spray   PROAIR  (90 Base) MCG/ACT inhaler   ofloxacin (FLOXIN) 0.3 % otic solution         Review of Systems   All other systems reviewed and are negative.      Physical Exam   BP: (!) 219/115(untreated htn)  Pulse: 85  Temp: 98  F (36.7  C)  Resp: 16  Weight: 122.5 kg (270 lb)  SpO2: 96 %      Physical Exam   Constitutional: He appears well-developed and well-nourished.   Alert, no acute distress, does not appear ill or toxic.   HENT:   Head: Normocephalic and atraumatic.   Right Ear: External ear normal.   Left Ear: External ear normal.   Nose: Nose normal.   Mouth/Throat: Oropharynx is clear and moist.   Eyes: Pupils are equal, round, and reactive to light. Conjunctivae and EOM are normal.   Neck: Normal range of motion. Neck supple.   Cardiovascular: Normal rate, regular rhythm, normal heart sounds and intact distal pulses.   Pulmonary/Chest: Effort normal and breath sounds normal.   Abdominal: Soft.       Nursing note and vitals reviewed.      ED Course        Procedures               Critical Care time:  none               Results for orders placed or performed during the hospital encounter of 04/08/19 (from the past 24 hour(s))   CBC with platelets differential    Result Value Ref Range    WBC 8.4 4.0 - 11.0 10e9/L    RBC Count 5.38 4.4 - 5.9 10e12/L    Hemoglobin 15.8 13.3 - 17.7 g/dL    Hematocrit 46.5 40.0 - 53.0 %    MCV 86 78 - 100 fl    MCH 29.4 26.5 - 33.0 pg    MCHC 34.0 31.5 - 36.5 g/dL    RDW 12.9 10.0 - 15.0 %    Platelet Count 247 150 - 450 10e9/L    Diff Method Automated Method     % Neutrophils 75.9 %    % Lymphocytes 15.2 %    % Monocytes 7.5 %    % Eosinophils 0.7 %    % Basophils 0.2 %    % Immature Granulocytes 0.5 %    Nucleated RBCs 0 0 /100    Absolute Neutrophil 6.4 1.6 - 8.3 10e9/L    Absolute Lymphocytes 1.3 0.8 - 5.3 10e9/L    Absolute Monocytes 0.6 0.0 - 1.3 10e9/L    Absolute Eosinophils 0.1 0.0 - 0.7 10e9/L    Absolute Basophils 0.0 0.0 - 0.2 10e9/L    Abs Immature Granulocytes 0.0 0 - 0.4 10e9/L    Absolute Nucleated RBC 0.0    CRP inflammation   Result Value Ref Range    CRP Inflammation 7.8 0.0 - 8.0 mg/L   Comprehensive metabolic panel   Result Value Ref Range    Sodium 139 133 - 144 mmol/L    Potassium 3.9 3.4 - 5.3 mmol/L    Chloride 109 94 - 109 mmol/L    Carbon Dioxide 26 20 - 32 mmol/L    Anion Gap 4 3 - 14 mmol/L    Glucose 96 70 - 99 mg/dL    Urea Nitrogen 14 7 - 30 mg/dL    Creatinine 1.10 0.66 - 1.25 mg/dL    GFR Estimate 71 >60 mL/min/[1.73_m2]    GFR Estimate If Black 83 >60 mL/min/[1.73_m2]    Calcium 8.5 8.5 - 10.1 mg/dL    Bilirubin Total 0.4 0.2 - 1.3 mg/dL    Albumin 3.7 3.4 - 5.0 g/dL    Protein Total 7.3 6.8 - 8.8 g/dL    Alkaline Phosphatase 92 40 - 150 U/L    ALT 33 0 - 70 U/L    AST 20 0 - 45 U/L   Lipase   Result Value Ref Range    Lipase 106 73 - 393 U/L   Lactic acid whole blood   Result Value Ref Range    Lactic Acid 1.0 0.7 - 2.0 mmol/L       Medications   lactated ringers BOLUS 1,000 mL (1,000 mLs Intravenous Not Given 4/8/19 1548)     Followed by   lactated ringers infusion (has no administration in time range)   ondansetron (ZOFRAN) injection 4 mg (has no administration in time range)       Assessments & Plan  (with Medical Decision Making)   64-year-old male past medical history reviewed as above who presents with concerns of approximately 10 days of bilateral upper abdominal spasm type discomfort as described in the HPI.  No associated nausea or vomiting.  No change in bowel habit.  Benign abdominal exam.  Lab diagnostics reviewed as above with no significant abnormals.  I do not think that the patient's discomfort is related to Tamiflu.  Perhaps relates to some muscle irritation from coughing from his influenza episode.  I certainly empathized with his frustration and have prescribed Flexeril to help him with muscle spasms, encouraged to good fluid intake and to return if not improving or worse.  . Return to the emergency department if worse or not improving.      Disclaimer: This note consists of symbols derived from keyboarding, dictation and/or voice recognition software. As a result, there may be errors in the script that have gone undetected. Please consider this when interpreting information found in this chart.        I have reviewed the nursing notes.    I have reviewed the findings, diagnosis, plan and need for follow up with the patient.          Medication List      Started    cyclobenzaprine 10 MG tablet  Commonly known as:  FLEXERIL  10 mg, Oral, 3 TIMES DAILY PRN        ASK your doctor about these medications    oseltamivir 75 MG capsule  Commonly known as:  TAMIFLU  75 mg, Oral, 2 TIMES DAILY  Ask about: Should I take this medication?            Final diagnoses:   Abdominal wall pain - Muscle spasm       4/8/2019   [facility] EMERGENCY DEPARTMENT     Warren Swain MD  04/08/19 7193

## 2019-04-12 ENCOUNTER — TELEPHONE (OUTPATIENT)
Dept: FAMILY MEDICINE | Facility: CLINIC | Age: 64
End: 2019-04-12

## 2019-04-12 NOTE — TELEPHONE ENCOUNTER
Reason for Call: Tamiflu side effects    Detailed comments: patient was calling to get in with Dr. Gavin BERMUDEZ, so I did put him in the soonest on 4/19. He had Influenza A 3 weeks ago, and got Tamiflu and states since he finished taking it, he has been,very tired, muscle soreness, Muscle tightness, wondering if he needs to be seen sooner than 4/19. Also wondering if he has thyroid issues. Please advise. If appt is not needed, just cancel.    Phone Number Patient can be reached at: Home number on file [phone] (home)    Best Time: any    Can we leave a detailed message on this number? YES   Kelly Corrales  Clinic Station  [facility]      Call taken on 4/12/2019 at 3:22 PM by Kelly Corrales

## 2019-04-12 NOTE — TELEPHONE ENCOUNTER
S-(situation): abdominal wall pain/ calf pain    B-(background): See ED notes from 4/8/2019, patient describing same symptoms; not improving    A-(assessment): Patient states his sides are feeling weak and are spasming. He states it is on both the right and the left side. Patient states he thought it was from coughing from the influenza but now his cough is gone and it still hurts. Patient states he is also having the same sensation in his calves. He states they feel shaky and crampy. Patient denies any severe pain or weakness in either calves or abdomen. Patient denies any warm/ reddened areas to touch/ swelling.           R-(recommendations): ED prescribed flexaril, patient advised to take this to see if it helps. He was also advised to keep appointment with Dr. Alonso. Renata SALDAÑA RN

## 2019-04-19 ENCOUNTER — OFFICE VISIT (OUTPATIENT)
Dept: FAMILY MEDICINE | Facility: CLINIC | Age: 64
End: 2019-04-19
Payer: COMMERCIAL

## 2019-04-19 VITALS
HEART RATE: 77 BPM | OXYGEN SATURATION: 96 % | SYSTOLIC BLOOD PRESSURE: 172 MMHG | WEIGHT: 275 LBS | HEIGHT: 74 IN | TEMPERATURE: 98.5 F | BODY MASS INDEX: 35.29 KG/M2 | DIASTOLIC BLOOD PRESSURE: 100 MMHG | RESPIRATION RATE: 18 BRPM

## 2019-04-19 DIAGNOSIS — J30.2 SEASONAL ALLERGIC RHINITIS, UNSPECIFIED TRIGGER: ICD-10-CM

## 2019-04-19 DIAGNOSIS — E66.01 MORBID OBESITY (H): ICD-10-CM

## 2019-04-19 DIAGNOSIS — Z12.11 SPECIAL SCREENING FOR MALIGNANT NEOPLASMS, COLON: Primary | ICD-10-CM

## 2019-04-19 DIAGNOSIS — H60.312 ACUTE DIFFUSE OTITIS EXTERNA OF LEFT EAR: ICD-10-CM

## 2019-04-19 DIAGNOSIS — R10.9 ABDOMINAL WALL PAIN: ICD-10-CM

## 2019-04-19 DIAGNOSIS — J45.20 MILD INTERMITTENT ASTHMA WITHOUT COMPLICATION: ICD-10-CM

## 2019-04-19 LAB — TSH SERPL DL<=0.005 MIU/L-ACNC: 1.16 MU/L (ref 0.4–4)

## 2019-04-19 PROCEDURE — 99214 OFFICE O/P EST MOD 30 MIN: CPT | Performed by: FAMILY MEDICINE

## 2019-04-19 PROCEDURE — 36415 COLL VENOUS BLD VENIPUNCTURE: CPT | Performed by: FAMILY MEDICINE

## 2019-04-19 PROCEDURE — 84443 ASSAY THYROID STIM HORMONE: CPT | Performed by: FAMILY MEDICINE

## 2019-04-19 RX ORDER — FLUTICASONE PROPIONATE 50 MCG
SPRAY, SUSPENSION (ML) NASAL
Qty: 1 G | Refills: 11 | Status: SHIPPED | OUTPATIENT
Start: 2019-04-19 | End: 2020-05-21

## 2019-04-19 RX ORDER — ALBUTEROL SULFATE 90 UG/1
AEROSOL, METERED RESPIRATORY (INHALATION)
Qty: 6.7 G | Refills: 11 | Status: SHIPPED | OUTPATIENT
Start: 2019-04-19 | End: 2022-03-11

## 2019-04-19 RX ORDER — OFLOXACIN 3 MG/ML
10 SOLUTION AURICULAR (OTIC) 2 TIMES DAILY
Qty: 10 ML | Refills: 5 | Status: SHIPPED | OUTPATIENT
Start: 2019-04-19 | End: 2021-05-05

## 2019-04-19 ASSESSMENT — MIFFLIN-ST. JEOR: SCORE: 2117.14

## 2019-04-19 NOTE — PATIENT INSTRUCTIONS
Our Clinic hours are:  Mondays    7:20 am - 7 pm  Tues -  Fri  7:20 am - 5 pm    Clinic Phone: [phone]    The clinic lab opens at 7:30 am Mon - Fri and appointments are required.    [facility]. [phone]  Monday  8 am - 7pm  Tues - Fri 8 am - 5:30 pm

## 2019-04-19 NOTE — PROGRESS NOTES
SUBJECTIVE:   Jayme Selby is a 64 year old male who presents to clinic today for the following health issues:      HPI    Chief Complaint   Patient presents with     RECHECK     recheck after having influenza A 3 weeks ago- he is having muscle pain, tightness in rib cage       Additional history: as documented    Reviewed and updated as needed this visit by clinical staff  Tobacco  Allergies         Reviewed and updated as needed this visit by Provider           Physician note based upon further history obtained, clarified or corrected and including examination performed:    HPI:  He complains of abdominal wall pain which may actually be getting better with time though is persistent.  He was in the emergency room for this.  Looking back he realizes he had been lifting a heavy speaker and then the following day he developed the onset of flulike symptoms which he dealt with for a number of days and took Tamiflu.  The abdominal discomfort has been persistent since then.  He also needs refill of his medications.  He is worried about allergy season starting up.  Finally he is quite concerned that he may have thyroid issues causing some of the symptoms since that runs in his family.    ROS: (Except as listed above in HPI the following systems are basically negative)  General: No change in weight, sleep or appetite.  Normal energy.  No fever or chills, no excessive fatigue or daytime drowsiness  Eyes: Negative for vision changes or eye problems  ENT: No problems with ears, nose or throat.  No difficulty swallowing.  Resp: No coughing, wheezing or shortness of breath, denies apnea  CV: No chest pains or palpitations  GI: No nausea, vomiting,  heartburn, abdominal pain, diarrhea, constipation or change in bowel habits  : No urinary frequency or dysuria, bladder or kidney problems  Musculoskeletal: No significant muscle or joint pains  Neurologic: No headaches, numbness, tingling, weakness, problems with balance or  coordination  Skin: No rashes,worrisome lesions or skin problems    Social History     Socioeconomic History     Marital status:      Spouse name: None     Number of children: None     Years of education: None     Highest education level: None   Occupational History     None   Social Needs     Financial resource strain: None     Food insecurity:     Worry: None     Inability: None     Transportation needs:     Medical: None     Non-medical: None   Tobacco Use     Smoking status: Current Every Day Smoker     Packs/day: 0.50     Years: 40.00     Pack years: 20.00     Types: Cigarettes     Smokeless tobacco: Never Used     Tobacco comment: smokes natural cigerettes not in box   Substance and Sexual Activity     Alcohol use: Yes     Comment: 5-8 drinks 1 day per week     Drug use: No     Sexual activity: Never   Lifestyle     Physical activity:     Days per week: None     Minutes per session: None     Stress: None   Relationships     Social connections:     Talks on phone: None     Gets together: None     Attends Sikh service: None     Active member of club or organization: None     Attends meetings of clubs or organizations: None     Relationship status: None     Intimate partner violence:     Fear of current or ex partner: None     Emotionally abused: None     Physically abused: None     Forced sexual activity: None   Other Topics Concern     Parent/sibling w/ CABG, MI or angioplasty before 65F 55M? Yes   Social History Narrative    March 10, 2017        ENVIRONMENTAL HISTORY: [place] The home is heated with a forced air and gas furnace. They does have central air conditioning. The patient's bedroom is furnished with carpeting in bedroom, allergen mattress cover, allergen pillowcase cover and fabric window coverings. No pets inside the house. There is not history of cockroach or mice infestation. There are 2 smokers in the house.  The house does not have a  "basement.        BP (!) 172/100   Pulse 77   Temp 98.5  F (36.9  C)   Resp 18   Ht 1.88 m (6' 2\")   Wt 124.7 kg (275 lb)   SpO2 96%   BMI 35.31 kg/m    HEAD: AT/NC  EYES: PERRLA, EOMI, Sclerae clear, Fundi normal with sharp discs  EARS: TMs clear, canals normal  NOSE & THROAT: clear   LUNGS: clear to auscultation, normal breath sounds  CV: RRR without murmur  ABD: BS+, soft, nontender, no masses, no hepatosplenomegaly  EXTREMITIES: without joint tenderness, swelling or erythema.  No muscle tenderness or abnormality.  SKIN: No rashes or abnormalities  NEURO:non focal exam    ASSESSMENT:      Abdominal wall pain  Special screening for malignant neoplasms, colon  Acute diffuse otitis externa of left ear  Mild intermittent asthma without complication  Seasonal allergic rhinitis, unspecified trigger  Morbid obesity (H)    The diagnoses listed above were all addressed on this visit. Some may be listed just as ongoing yet needing a medication refill, in which case that was discussed and confirmed with the patient at this visit.    PLAN:  I suspect he has abdominal wall strain and that it will continue to heal but we will check thyroid status today.    Orders Placed This Encounter     Asthma Action Plan (AAP)     Fecal colorectal cancer screen (FIT)     TSH with free T4 reflex     ofloxacin (FLOXIN) 0.3 % otic solution     albuterol (PROAIR HFA) 108 (90 Base) MCG/ACT inhaler     fluticasone (FLONASE) 50 MCG/ACT nasal spray             "

## 2019-04-19 NOTE — LETTER
[facility]  [address]  [place] [address]  Phone: [phone]      4/22/2019     Jayme Selby  [id number]  [place] [address]      Dear Jayme:    Thank you for allowing me to participate in your care. Your recent test results were reviewed and listed below.  normal/acceptable results.     Your results are provided below for your review  Results for orders placed or performed in visit on 04/19/19   TSH with free T4 reflex   Result Value Ref Range    TSH 1.16 0.40 - 4.00 mU/L                 Thank you for choosing [facility]. As a result, please continue with the treatment plan discussed in the office. Return as discussed or sooner if symptoms worsen or fail to improve.     If you have any further questions or concerns, please do not hesitate to contact us.      Sincerely,        Dr. Fox Alonso

## 2019-04-20 ASSESSMENT — ASTHMA QUESTIONNAIRES: ACT_TOTALSCORE: 24

## 2019-04-22 PROCEDURE — 82274 ASSAY TEST FOR BLOOD FECAL: CPT | Performed by: FAMILY MEDICINE

## 2019-04-25 ENCOUNTER — TELEPHONE (OUTPATIENT)
Dept: FAMILY MEDICINE | Facility: CLINIC | Age: 64
End: 2019-04-25

## 2019-04-25 ENCOUNTER — MYC MEDICAL ADVICE (OUTPATIENT)
Dept: FAMILY MEDICINE | Facility: CLINIC | Age: 64
End: 2019-04-25

## 2019-04-25 NOTE — TELEPHONE ENCOUNTER
Pt given lab results-normal.  Pt concerned swallowing seems difficult?  Pt is eating and drinking fine but feels some tightness.  Has had GERD in the past but doesn't feel it is GERD symptoms.  Scheduled appt.  Mehrdad

## 2019-04-27 LAB — HEMOCCULT STL QL IA: NEGATIVE

## 2019-04-29 DIAGNOSIS — Z12.11 SPECIAL SCREENING FOR MALIGNANT NEOPLASMS, COLON: ICD-10-CM

## 2019-04-30 ENCOUNTER — OFFICE VISIT (OUTPATIENT)
Dept: FAMILY MEDICINE | Facility: CLINIC | Age: 64
End: 2019-04-30
Payer: COMMERCIAL

## 2019-04-30 VITALS
DIASTOLIC BLOOD PRESSURE: 102 MMHG | HEART RATE: 80 BPM | RESPIRATION RATE: 16 BRPM | BODY MASS INDEX: 35.31 KG/M2 | HEIGHT: 74 IN | SYSTOLIC BLOOD PRESSURE: 144 MMHG | TEMPERATURE: 98.3 F | OXYGEN SATURATION: 97 %

## 2019-04-30 DIAGNOSIS — R07.0 THROAT PAIN: Primary | ICD-10-CM

## 2019-04-30 DIAGNOSIS — I10 UNCONTROLLED HYPERTENSION: ICD-10-CM

## 2019-04-30 DIAGNOSIS — M62.838 MUSCLE SPASM: ICD-10-CM

## 2019-04-30 LAB
DEPRECATED S PYO AG THROAT QL EIA: NORMAL
SPECIMEN SOURCE: NORMAL

## 2019-04-30 PROCEDURE — 99214 OFFICE O/P EST MOD 30 MIN: CPT | Performed by: NURSE PRACTITIONER

## 2019-04-30 PROCEDURE — 87880 STREP A ASSAY W/OPTIC: CPT | Performed by: NURSE PRACTITIONER

## 2019-04-30 PROCEDURE — 87081 CULTURE SCREEN ONLY: CPT | Performed by: NURSE PRACTITIONER

## 2019-04-30 NOTE — LETTER
May 1, 2019      Jayme Selby  [address] [address] [address]        Dear Jayme,       The results of your 24 hour throat culture were negative. Please contact your clinic if you have any questions or concerns.      Sincerely,        STEPAN Barreto CNP

## 2019-04-30 NOTE — PATIENT INSTRUCTIONS
Patient Education     Magnesium Oxide 400 mg daily    Muscle Spasm  A muscle spasm is a sudden tightening of the muscle you can t control. This may be caused by strain, overworking the muscle, or injury. It can also be caused by dehydration, electrolyte imbalance, diabetes, alcohol use, and certain medicines. If it goes on long enough the muscle spasm causes pain. Common areas for muscle spasm are the legs, neck, and back.  Home care    Heat, massage, and stretching will help relax muscle spasm.    When the spasm is in your arm or leg, stretch the muscle passively. To do this, have someone bend or straighten the joint above or below the muscle until you feel the stretch on the sore muscle. You can stretch the muscle actively by moving the affected body part. This will stretch the muscle that is in spasm. For example, if the spasm is in your calf, bend the ankle so your toes point upward toward your knee. This will stretch your calf muscle.    You may use over-the-counter pain medicine to control pain, unless another medicine was prescribed. If you have chronic liver or kidney disease or ever had a stomach ulcer or gastrointestinal bleeding, talk with your healthcare provider before using these medicines.  Follow-up care  Follow up with your healthcare provider, or as advised.    When to seek medical advice  Call your healthcare provider right away if any of the following occur:    Fingers or toes become swollen, cold, blue, numb, or tingly    You develop weakness in the affected arm or leg    Pain increases and is not controlled by the above measures  Date Last Reviewed: 5/1/2018 2000-2018 [employer]. [address] [place] [address], [place] [address]. All rights reserved. This information is not intended as a substitute for professional medical care. Always follow your healthcare professional's instructions.

## 2019-04-30 NOTE — PROGRESS NOTES
SUBJECTIVE:   Jayme Selby is a 64 year old male who presents to clinic today for the following   health issues:        Chief Complaint   Patient presents with     Patient Request     pt. is here today with concerns about swallowing issue X 1 1/2 weeks. ? thyroid pt. had TSH 4/19/2019     Patient Request     concerns with muscle spasms on the sides?  X 1 month. pt. was installing a PA system. ? related. and then later had the flu and a lot of coughing.      Patient reports 1-2 weeks of throat discomfort with swallowing. Seems to be worse in early morning and late evening. After he eats breakfast in the morning he doesn't notice it. Denies fevers. Denies feeling like food gets stuck. Denies voice changes. He isn't sure if he has had enlarged or tender lymph nodes. He denies allergy symptoms. He did have influenza A a few weeks ago, the cough has resolved. Is a current smoker. Screened for thyroid disease, lab unremarkable.     Also c/o ongoing muscle spasms on both sides of his abdomen. Started when he felt a strain in the abdominal wall while holding a 30-40 lb speaker to aid in installation about 6 weeks ago. The primary symptom is a muscle tightening and twitching if he moves wrong or sits or stands for prolonged periods. He denies abdominal pain, indigestion, heartburn, belching, nausea, diarrhea or other GI symptoms. Eating does not affect the twitching. Also gets cramping in his calves, a magnesium topical cream helps with the calves and the abdomen.     HYPERTENSION: Patient is not checking blood pressures. Feels he is worked up today regarding this ongoing abdominal muscle spasms. Has been on multiple different blood pressure medications in the past with side effects to all of them. Absolutely refusing today to discuss starting a blood pressure medication. Denies chest pain, dyspnea or dizziness. Denies snoring or witnessed apnea.       Additional history: as documented    Reviewed  and updated as needed  "this visit by clinical staff  Tobacco  Allergies  Med Hx  Surg Hx  Fam Hx  Soc Hx        Reviewed and updated as needed this visit by Provider         Patient Active Problem List   Diagnosis     Tobacco use disorder     Allergic rhinitis due to pollen     Hyperlipidemia LDL goal <130     Advanced directives, counseling/discussion     Sinusitis, chronic     Allergic rhinitis due to animal dander     Seasonal allergic rhinitis     Diagnostic skin and sensitization tests (aka ALLERGENS)     Mild intermittent asthma without complication     Essential hypertension with goal blood pressure less than 140/90     Sigmoid diverticulitis     Obesity (BMI 35.0-39.9) with comorbidity (H)     Past Surgical History:   Procedure Laterality Date     CHOLECYSTECTOMY  09/1999     ETHMOIDECTOMY  5/7/2014    Procedure: ETHMOIDECTOMY;  Surgeon: Kai Sandoval MD;  Location: [facility]     ETHMOIDECTOMY  6/4/2014    Procedure: ETHMOIDECTOMY;  Surgeon: Kai Sandoval MD;  Location: [facility]     TONSILLECTOMY       TYMPANOTOMY EXPLORATORY Left 1/29/2015    Procedure: TYMPANOTOMY EXPLORATORY;  Surgeon: Michel He MD;  Location:  OR       Social History     Tobacco Use     Smoking status: Current Every Day Smoker     Packs/day: 0.50     Years: 40.00     Pack years: 20.00     Types: Cigarettes     Smokeless tobacco: Never Used     Tobacco comment: smokes natural cigerettes not in box   Substance Use Topics     Alcohol use: Yes     Comment: 5-8 drinks 1 day per week     Family History   Problem Relation Age of Onset     Diabetes Mother      LUNG DISEASE Mother      Hypertension Father      C.A.D. Father      Hyperlipidemia Father      Seasonal/Environmental Allergies Sister            ROS:  Constitutional, HEENT, cardiovascular, pulmonary, gi and gu systems are negative, except as otherwise noted.    OBJECTIVE:     BP (!) 144/102   Pulse 80   Temp 98.3  F (36.8  C) (Oral)   Resp 16   Ht 1.88 m (6' 2\")   SpO2 97%   " BMI 35.31 kg/m    Body mass index is 35.31 kg/m .  GENERAL: healthy, alert and no distress  EYES: Eyes grossly normal to inspection, PERRL and conjunctivae and sclerae normal  HENT: normal cephalic/atraumatic, ear canals and TM's normal, nose and mouth without ulcers or lesions, oropharynx clear, oral mucous membranes moist and sinuses: not tender, tonsils surgically absent.  NECK: no adenopathy, no asymmetry, masses, or scars and thyroid normal to palpation  RESP: lungs clear to auscultation - no rales, rhonchi or wheezes  CV: regular rates and rhythm, normal S1 S2, no S3 or S4, no murmur, click or rub and no peripheral edema  ABDOMEN: soft, nontender, without hepatosplenomegaly or masses, bowel sounds normal and no hernia's  MS: no gross musculoskeletal defects noted, no edema  SKIN: no suspicious lesions or rashes  NEURO: Normal strength and tone, mentation intact and speech normal    Diagnostic Test Results:  Results for orders placed or performed in visit on 04/30/19 (from the past 24 hour(s))   Strep, Rapid Screen   Result Value Ref Range    Specimen Description Throat     Rapid Strep A Screen       NEGATIVE: No Group A streptococcal antigen detected by immunoassay, await culture report.       ASSESSMENT/PLAN:       ICD-10-CM    1. Throat pain R07.0 Strep, Rapid Screen     Beta strep group A culture     OTOLARYNGOLOGY REFERRAL   2. Muscle spasm M62.838    3. Uncontrolled hypertension I10      REFERRAL: See ENT for persistent throat symptoms.     FUTURE APPOINTMENTS:       - RETURN TO CLINIC for new or worsening symptoms. May trial OTC Magnesium Oxide 400 mg PO Daily for muscle cramps. Declines information for smoking cessation. Work on weight loss.    STEPAN Barreto [facility]

## 2019-05-01 LAB
BACTERIA SPEC CULT: NORMAL
SPECIMEN SOURCE: NORMAL

## 2019-11-08 ENCOUNTER — IMMUNIZATION (OUTPATIENT)
Dept: FAMILY MEDICINE | Facility: CLINIC | Age: 64
End: 2019-11-08
Payer: COMMERCIAL

## 2019-11-08 PROCEDURE — 90471 IMMUNIZATION ADMIN: CPT

## 2019-11-08 PROCEDURE — 90682 RIV4 VACC RECOMBINANT DNA IM: CPT

## 2020-03-02 ENCOUNTER — HEALTH MAINTENANCE LETTER (OUTPATIENT)
Age: 65
End: 2020-03-02

## 2020-05-21 DIAGNOSIS — J30.2 SEASONAL ALLERGIC RHINITIS, UNSPECIFIED TRIGGER: ICD-10-CM

## 2020-05-21 RX ORDER — FLUTICASONE PROPIONATE 50 MCG
SPRAY, SUSPENSION (ML) NASAL
Qty: 16 G | Refills: 10 | Status: SHIPPED | OUTPATIENT
Start: 2020-05-21 | End: 2021-05-05

## 2020-05-21 NOTE — TELEPHONE ENCOUNTER
Requested Prescriptions   Pending Prescriptions Disp Refills     fluticasone (FLONASE) 50 MCG/ACT nasal spray [Pharmacy Med Name: FLUTICASONE 50MCG NASAL SP (120) RX] 16 g      Sig: USE 2 SPRAYS IN EACH NOSTRIL EVERY DAY AS NEEDED       Nasal Allergy Protocol Passed - 5/21/2020  8:36 AM        Passed - Patient is age 12 or older        Passed - Medication is active on med list         Last Office visit: 4/30/20  Prescription approved per [facility] Refill Protocol.

## 2020-05-23 ENCOUNTER — HOSPITAL ENCOUNTER (EMERGENCY)
Facility: CLINIC | Age: 65
Discharge: HOME | End: 2020-05-23
Attending: EMERGENCY MEDICINE | Admitting: EMERGENCY MEDICINE
Payer: COMMERCIAL

## 2020-05-23 VITALS
HEART RATE: 77 BPM | WEIGHT: 270 LBS | SYSTOLIC BLOOD PRESSURE: 153 MMHG | OXYGEN SATURATION: 97 % | HEIGHT: 74 IN | RESPIRATION RATE: 16 BRPM | TEMPERATURE: 98.1 F | BODY MASS INDEX: 34.65 KG/M2 | DIASTOLIC BLOOD PRESSURE: 104 MMHG

## 2020-05-23 DIAGNOSIS — K57.32 DIVERTICULITIS OF SIGMOID COLON: ICD-10-CM

## 2020-05-23 LAB
ANION GAP SERPL CALCULATED.3IONS-SCNC: 7 MMOL/L (ref 3–14)
BASOPHILS # BLD AUTO: 0 10E9/L (ref 0–0.2)
BASOPHILS NFR BLD AUTO: 0.2 %
BUN SERPL-MCNC: 11 MG/DL (ref 7–30)
CALCIUM SERPL-MCNC: 8.6 MG/DL (ref 8.5–10.1)
CHLORIDE SERPL-SCNC: 107 MMOL/L (ref 94–109)
CO2 SERPL-SCNC: 25 MMOL/L (ref 20–32)
CREAT SERPL-MCNC: 1.03 MG/DL (ref 0.66–1.25)
DIFFERENTIAL METHOD BLD: ABNORMAL
EOSINOPHIL # BLD AUTO: 0.1 10E9/L (ref 0–0.7)
EOSINOPHIL NFR BLD AUTO: 0.8 %
ERYTHROCYTE [DISTWIDTH] IN BLOOD BY AUTOMATED COUNT: 12.9 % (ref 10–15)
GFR SERPL CREATININE-BSD FRML MDRD: 77 ML/MIN/{1.73_M2}
GLUCOSE SERPL-MCNC: 123 MG/DL (ref 70–99)
HCT VFR BLD AUTO: 46.2 % (ref 40–53)
HGB BLD-MCNC: 15.8 G/DL (ref 13.3–17.7)
IMM GRANULOCYTES # BLD: 0.1 10E9/L (ref 0–0.4)
IMM GRANULOCYTES NFR BLD: 0.4 %
LYMPHOCYTES # BLD AUTO: 1.7 10E9/L (ref 0.8–5.3)
LYMPHOCYTES NFR BLD AUTO: 13 %
MCH RBC QN AUTO: 29.3 PG (ref 26.5–33)
MCHC RBC AUTO-ENTMCNC: 34.2 G/DL (ref 31.5–36.5)
MCV RBC AUTO: 86 FL (ref 78–100)
MONOCYTES # BLD AUTO: 1.1 10E9/L (ref 0–1.3)
MONOCYTES NFR BLD AUTO: 8 %
NEUTROPHILS # BLD AUTO: 10.3 10E9/L (ref 1.6–8.3)
NEUTROPHILS NFR BLD AUTO: 77.6 %
NRBC # BLD AUTO: 0 10*3/UL
NRBC BLD AUTO-RTO: 0 /100
PLATELET # BLD AUTO: 217 10E9/L (ref 150–450)
POTASSIUM SERPL-SCNC: 3.8 MMOL/L (ref 3.4–5.3)
RBC # BLD AUTO: 5.4 10E12/L (ref 4.4–5.9)
SODIUM SERPL-SCNC: 139 MMOL/L (ref 133–144)
WBC # BLD AUTO: 13.3 10E9/L (ref 4–11)

## 2020-05-23 PROCEDURE — 85025 COMPLETE CBC W/AUTO DIFF WBC: CPT | Performed by: EMERGENCY MEDICINE

## 2020-05-23 PROCEDURE — 99284 EMERGENCY DEPT VISIT MOD MDM: CPT | Mod: Z6 | Performed by: EMERGENCY MEDICINE

## 2020-05-23 PROCEDURE — 99283 EMERGENCY DEPT VISIT LOW MDM: CPT | Performed by: EMERGENCY MEDICINE

## 2020-05-23 PROCEDURE — 80048 BASIC METABOLIC PNL TOTAL CA: CPT | Performed by: EMERGENCY MEDICINE

## 2020-05-23 RX ORDER — HYDROCODONE BITARTRATE AND ACETAMINOPHEN 5; 325 MG/1; MG/1
1-2 TABLET ORAL EVERY 4 HOURS PRN
Qty: 12 TABLET | Refills: 0 | Status: SHIPPED | OUTPATIENT
Start: 2020-05-23 | End: 2021-05-05

## 2020-05-23 ASSESSMENT — MIFFLIN-ST. JEOR: SCORE: 2089.46

## 2020-05-23 NOTE — ED PROVIDER NOTES
History     Chief Complaint   Patient presents with     Abdominal Pain     feels like previous bouts of diverticulitis     HPI  Jayme Selby is a 65 year old male with a history of recurrent sigmoid diverticulitis with recurrence well localized lower abdominal pain consistent with recurrent diverticulitis with onset in the past ~ 24 hours.  Localized sharp pain, moderate to severe, well localized nonradiating.  Nothing taken for pain relief.  No nausea, vomiting, signs or symptoms of GI bleeding or fever or chills.  He reports that he does not get colonoscopies as he knows of someone who had a complication of perforated colon from colonoscopy.    Previous Records Reviewed:  CT ABDOMEN AND PELVIS WITH CONTRAST 6/28/2016  IMPRESSION: Sigmoid diverticulosis with pericolonic inflammatory  stranding suggestive of diverticulitis. Followup colonoscopy is  recommended following treatment as clinically warranted to exclude an  underlying lesion. The appearance is unchanged compared to 6/27/2016.    Allergies:  Allergies   Allergen Reactions     Advair Diskus Other (See Comments)     Adverse reaction per previous medical records.  Vision changes     Amlodipine Cough     Flu Like Symptoms       Lisinopril Cough     Per patient.     Losartan Cough     Flu like symptoms       Chlorthalidone Other (See Comments) and Rash     Lesions on Face, Chest Pressure         Problem List:    Patient Active Problem List    Diagnosis Date Noted     Obesity (BMI 35.0-39.9) with comorbidity (H) 04/19/2019     Priority: Medium     Sigmoid diverticulitis 06/28/2016     Priority: Medium     Essential hypertension with goal blood pressure less than 140/90 06/23/2016     Priority: Medium     Mild intermittent asthma without complication 10/27/2015     Priority: Medium     Diagnosis updated by automated process. Provider to review and confirm.       Allergic rhinitis due to animal dander      Priority: Medium     12/5/14 IgE tests pos. only for  cat/dog/T (all other environmental allergens NEGATIVE)       Seasonal allergic rhinitis      Priority: Medium     Diagnostic skin and sensitization tests (aka ALLERGENS)      Priority: Medium     Sinusitis, chronic 10/23/2014     Priority: Medium     Advanced directives, counseling/discussion 04/22/2014     Priority: Medium     Patient does not have an Advance/Health Care Directive (HCD), declines information/referral.    Gracy Byers  April 22, 2014         Hyperlipidemia LDL goal <130 02/25/2014     Priority: Medium     Score not calculated. Missing: Total Cholesterol, HDL  Based history of hypertension and smoking and age has greater than 2 risk factors for CHD.       Tobacco use disorder 02/20/2014     Priority: Medium     Allergic rhinitis due to pollen 02/20/2014     Priority: Medium        Past Medical History:    Past Medical History:   Diagnosis Date     Allergic rhinitis due to animal dander      Diagnostic skin and sensitization tests (aka ALLERGENS) 12/5/14 IgE tests pos. only for cat/dog/T (all other environmental allergens NEGATIVE)     GERD (gastroesophageal reflux disease)      H. pylori infection 1999     Seasonal allergic rhinitis        Past Surgical History:    Past Surgical History:   Procedure Laterality Date     CHOLECYSTECTOMY  09/1999     ETHMOIDECTOMY  5/7/2014    Procedure: ETHMOIDECTOMY;  Surgeon: Kai Sandoval MD;  Location: [facility]     ETHMOIDECTOMY  6/4/2014    Procedure: ETHMOIDECTOMY;  Surgeon: Kai Sandoval MD;  Location: [facility]     TONSILLECTOMY       TYMPANOTOMY EXPLORATORY Left 1/29/2015    Procedure: TYMPANOTOMY EXPLORATORY;  Surgeon: Michel He MD;  Location:  OR       Family History:    Family History   Problem Relation Age of Onset     Diabetes Mother      LUNG DISEASE Mother      Hypertension Father      C.A.D. Father      Hyperlipidemia Father      Seasonal/Environmental Allergies Sister        Social History:  Marital Status:    "[2]  Social History     Tobacco Use     Smoking status: Current Every Day Smoker     Packs/day: 0.50     Years: 40.00     Pack years: 20.00     Types: Cigarettes     Smokeless tobacco: Never Used     Tobacco comment: smokes natural cigerettes not in box   Substance Use Topics     Alcohol use: Yes     Comment: 5-8 drinks 1 day per week     Drug use: No        Medications:    amoxicillin-clavulanate (AUGMENTIN) 875-125 MG tablet  HYDROcodone-acetaminophen (NORCO) 5-325 MG tablet  albuterol (PROAIR HFA) 108 (90 Base) MCG/ACT inhaler  ascorbic acid (VITAMIN C) 250 MG CHEW  calcium carbonate (TUMS) 500 MG chewable tablet  cetirizine (ZYRTEC ALLERGY) 10 MG tablet  DM-Doxylamine-Acetaminophen (NYQUIL COLD & FLU PO)  fluticasone (FLONASE) 50 MCG/ACT nasal spray  ofloxacin (FLOXIN) 0.3 % otic solution        Review of Systems  As mentioned above in the history present illness.  All other systems were reviewed and are negative.    Physical Exam   BP: (!) 199/109  Pulse: 92  Temp: 98.1  F (36.7  C)  Resp: 18  Height: 188 cm (6' 2\")  Weight: 122.5 kg (270 lb)  SpO2: 98 %      Physical Exam  Vitals signs and nursing note reviewed.   Constitutional:       General: He is not in acute distress.     Appearance: Normal appearance. He is well-developed. He is not ill-appearing or diaphoretic.   HENT:      Head: Normocephalic and atraumatic.      Right Ear: External ear normal.      Left Ear: External ear normal.      Nose: Nose normal.      Mouth/Throat:      Mouth: Mucous membranes are moist.   Eyes:      General: No scleral icterus.     Extraocular Movements: Extraocular movements intact.      Conjunctiva/sclera: Conjunctivae normal.   Neck:      Musculoskeletal: Normal range of motion and neck supple.      Trachea: No tracheal deviation.   Cardiovascular:      Rate and Rhythm: Normal rate and regular rhythm.      Pulses: Normal pulses.      Heart sounds: Normal heart sounds. No murmur. No friction rub. No gallop.    Pulmonary:      " Effort: Pulmonary effort is normal. No respiratory distress.      Breath sounds: Normal breath sounds. No wheezing, rhonchi or rales.   Abdominal:      General: There is no distension or abdominal bruit.      Palpations: Abdomen is soft. There is no pulsatile mass.      Tenderness: There is no abdominal tenderness. There is no right CVA tenderness, left CVA tenderness, guarding or rebound.      Hernia: No hernia is present.       Musculoskeletal: Normal range of motion.         General: No tenderness.      Right lower leg: No edema.      Left lower leg: No edema.   Skin:     General: Skin is warm and dry.      Coloration: Skin is not pale.      Findings: No erythema or rash.   Neurological:      General: No focal deficit present.      Mental Status: He is alert and oriented to person, place, and time.      Coordination: Coordination normal.   Psychiatric:         Mood and Affect: Mood normal.         Behavior: Behavior normal.         ED Course        Procedures                   Results for orders placed or performed during the hospital encounter of 05/23/20 (from the past 24 hour(s))   CBC with platelets differential   Result Value Ref Range    WBC 13.3 (H) 4.0 - 11.0 10e9/L    RBC Count 5.40 4.4 - 5.9 10e12/L    Hemoglobin 15.8 13.3 - 17.7 g/dL    Hematocrit 46.2 40.0 - 53.0 %    MCV 86 78 - 100 fl    MCH 29.3 26.5 - 33.0 pg    MCHC 34.2 31.5 - 36.5 g/dL    RDW 12.9 10.0 - 15.0 %    Platelet Count 217 150 - 450 10e9/L    Diff Method Automated Method     % Neutrophils 77.6 %    % Lymphocytes 13.0 %    % Monocytes 8.0 %    % Eosinophils 0.8 %    % Basophils 0.2 %    % Immature Granulocytes 0.4 %    Nucleated RBCs 0 0 /100    Absolute Neutrophil 10.3 (H) 1.6 - 8.3 10e9/L    Absolute Lymphocytes 1.7 0.8 - 5.3 10e9/L    Absolute Monocytes 1.1 0.0 - 1.3 10e9/L    Absolute Eosinophils 0.1 0.0 - 0.7 10e9/L    Absolute Basophils 0.0 0.0 - 0.2 10e9/L    Abs Immature Granulocytes 0.1 0 - 0.4 10e9/L    Absolute Nucleated RBC  0.0    Basic metabolic panel   Result Value Ref Range    Sodium 139 133 - 144 mmol/L    Potassium 3.8 3.4 - 5.3 mmol/L    Chloride 107 94 - 109 mmol/L    Carbon Dioxide 25 20 - 32 mmol/L    Anion Gap 7 3 - 14 mmol/L    Glucose 123 (H) 70 - 99 mg/dL    Urea Nitrogen 11 7 - 30 mg/dL    Creatinine 1.03 0.66 - 1.25 mg/dL    GFR Estimate 77 >60 mL/min/[1.73_m2]    GFR Estimate If Black 89 >60 mL/min/[1.73_m2]    Calcium 8.6 8.5 - 10.1 mg/dL       Medications - No data to display    Patient declined CT imaging evaluation, which I recommended due to this multiple recurrences and because it was deferred with most recent previous occurrence.  He is confident that his symptoms are due to recurrent diverticulitis and does not want CT imaging, which is clinically acceptable with understanding of the risks and benefits of performing a CT and understanding that perforation and its complications could be present or develop.  He also understands the association of cancer with this and accepts the risks nauseated with this.  We discussed in detail in layman's terms, signs and symptoms that indicate need for emergent reevaluation.    Assessments & Plan (with Medical Decision Making)   65-year-old male with history of recurrent sigmoid diverticulitis with symptoms of recurrent diverticulitis in the past 24 hours.  Nonsurgical abdomen with mildly elevated WBC with left shift.  No fever or signs or symptoms of GI bleeding.  He declined CT imaging and is confident that his symptoms are due to recurrent diverticulitis.  He understands risk of perforation of diverticulitis and GI bleeding and sepsis the risks of these and is adamant he does not want a CT scan today.  Will Rx Augmentin, which is worked successfully for him in the past (he does not want to use a fluoroquinolone).  He was prescribed Norco to use for pain.  I recommended he follow-up in surgery clinic and get an outpatient colonoscopy in the near future.  He has not previously  had a colonoscopy due to fear of the procedure due to knowledge of someone who had a colon perforation due to the procedure.  He was provided instructions for supportive care and will return as needed for worsened condition or worsening symptoms, or new problems or concerns.    I have reviewed the nursing notes.    I have reviewed the findings, diagnosis, plan and need for follow up with the patient.      Discharge Medication List as of 5/23/2020  3:49 PM      START taking these medications    Details   amoxicillin-clavulanate (AUGMENTIN) 875-125 MG tablet Take 1 tablet by mouth 2 times daily for 10 days, Disp-20 tablet,R-0, E-Prescribe      HYDROcodone-acetaminophen (NORCO) 5-325 MG tablet Take 1-2 tablets by mouth every 4 hours as needed for moderate to severe pain or severe pain maximum 6 tablet(s) per day, Disp-12 tablet,R-0, E-Prescribe             Final diagnoses:   Diverticulitis of sigmoid colon       5/23/2020   [facility] EMERGENCY DEPARTMENT     Serafin Black MD  05/23/20 1373

## 2020-05-23 NOTE — ED AVS SNAPSHOT
[facility] Emergency Department  [address] [address]  Phone:  [phone]  Fax:  [phone]                                    Jayme Selby   MRN: [id number]    Department:  [facility] Emergency Department   Date of Visit:  5/23/2020           After Visit Summary Signature Page    I have received my discharge instructions, and my questions have been answered. I have discussed any challenges I see with this plan with the nurse or doctor.    ..........................................................................................................................................  Patient/Patient Representative Signature      ..........................................................................................................................................  Patient Representative Print Name and Relationship to Patient    ..................................................               ................................................  Date                                   Time    ..........................................................................................................................................  Reviewed by Signature/Title    ...................................................              ..............................................  Date                                               Time          22EPIC Rev 08/18

## 2020-12-20 ENCOUNTER — HEALTH MAINTENANCE LETTER (OUTPATIENT)
Age: 65
End: 2020-12-20

## 2021-04-24 ENCOUNTER — HEALTH MAINTENANCE LETTER (OUTPATIENT)
Age: 66
End: 2021-04-24

## 2021-05-05 ENCOUNTER — ANCILLARY PROCEDURE (OUTPATIENT)
Dept: GENERAL RADIOLOGY | Facility: CLINIC | Age: 66
End: 2021-05-05
Attending: INTERNAL MEDICINE
Payer: COMMERCIAL

## 2021-05-05 ENCOUNTER — OFFICE VISIT (OUTPATIENT)
Dept: FAMILY MEDICINE | Facility: CLINIC | Age: 66
End: 2021-05-05
Payer: COMMERCIAL

## 2021-05-05 VITALS
HEART RATE: 82 BPM | RESPIRATION RATE: 17 BRPM | DIASTOLIC BLOOD PRESSURE: 114 MMHG | BODY MASS INDEX: 35.06 KG/M2 | OXYGEN SATURATION: 95 % | SYSTOLIC BLOOD PRESSURE: 180 MMHG | HEIGHT: 74 IN | WEIGHT: 273.2 LBS | TEMPERATURE: 99 F

## 2021-05-05 DIAGNOSIS — J45.21 MILD INTERMITTENT ASTHMA WITH ACUTE EXACERBATION: Primary | ICD-10-CM

## 2021-05-05 DIAGNOSIS — J30.2 SEASONAL ALLERGIC RHINITIS, UNSPECIFIED TRIGGER: ICD-10-CM

## 2021-05-05 DIAGNOSIS — I10 ESSENTIAL HYPERTENSION: ICD-10-CM

## 2021-05-05 DIAGNOSIS — R05.9 COUGH: ICD-10-CM

## 2021-05-05 PROCEDURE — 71046 X-RAY EXAM CHEST 2 VIEWS: CPT | Mod: FY | Performed by: RADIOLOGY

## 2021-05-05 PROCEDURE — 99214 OFFICE O/P EST MOD 30 MIN: CPT | Performed by: INTERNAL MEDICINE

## 2021-05-05 RX ORDER — FLUTICASONE PROPIONATE 50 MCG
SPRAY, SUSPENSION (ML) NASAL
Qty: 16 G | Refills: 10 | Status: SHIPPED | OUTPATIENT
Start: 2021-05-05 | End: 2022-03-11

## 2021-05-05 RX ORDER — PREDNISONE 20 MG/1
40 TABLET ORAL DAILY
Qty: 10 TABLET | Refills: 0 | Status: SHIPPED | OUTPATIENT
Start: 2021-05-05 | End: 2021-05-10

## 2021-05-05 RX ORDER — ALBUTEROL SULFATE 90 UG/1
2 AEROSOL, METERED RESPIRATORY (INHALATION) EVERY 6 HOURS
Qty: 18 G | Refills: 3 | Status: SHIPPED | OUTPATIENT
Start: 2021-05-05 | End: 2021-08-05

## 2021-05-05 RX ORDER — MONTELUKAST SODIUM 10 MG/1
10 TABLET ORAL AT BEDTIME
Qty: 90 TABLET | Refills: 3 | Status: SHIPPED | OUTPATIENT
Start: 2021-05-05 | End: 2022-03-11

## 2021-05-05 RX ORDER — MULTIPLE VITAMINS W/ MINERALS TAB 9MG-400MCG
1 TAB ORAL DAILY
COMMUNITY

## 2021-05-05 ASSESSMENT — MIFFLIN-ST. JEOR: SCORE: 2098.98

## 2021-05-05 NOTE — PROGRESS NOTES
Assessment & Plan     Mild intermittent asthma with acute exacerbation    Phillip presents with symptoms consistent with asthma exacerbation, possible allergy and/or viral related though longer duration of symptoms favors allergic etiology.  No pneumonia seen on CXR; advised him that antibiotics are not indicated.  We'll treat with a short course of prednisone and start Singulair in addition to his Zyrtec.  Recommended resuming Flonase.  Albuterol refilled.  Follow-up as needed if not improving in a week or new/worsening symptoms develop.       - albuterol (PROAIR HFA/PROVENTIL HFA/VENTOLIN HFA) 108 (90 Base) MCG/ACT inhaler; Inhale 2 puffs into the lungs every 6 hours  - montelukast (SINGULAIR) 10 MG tablet; Take 1 tablet (10 mg) by mouth At Bedtime  - predniSONE (DELTASONE) 20 MG tablet; Take 2 tablets (40 mg) by mouth daily for 5 days    Seasonal allergic rhinitis, unspecified trigger    - fluticasone (FLONASE) 50 MCG/ACT nasal spray; USE 2 SPRAYS IN EACH NOSTRIL EVERY DAY AS NEEDED  - montelukast (SINGULAIR) 10 MG tablet; Take 1 tablet (10 mg) by mouth At Bedtime    Cough    - XR Chest 2 Views    Essential HTN    BP very elevated today.  Discussed risks of high BPs, but he declines to resume medication, had too many med side effects on previous treatment.          Return in about 1 week (around 5/12/2021) for Follow up if not improving.    Todd Dean MD  [facility]   Phillip is a 66 year old who presents for the following health issues     HPI     Acute Illness  Acute illness concerns: URI  Onset/Duration: x 3 weeks  Symptoms:  Fever: no  Chills/Sweats: no  Headache (location?): YES  Sinus Pressure: no  Conjunctivitis:  YES- itchy. Watery   Ear Pain: no  Rhinorrhea: YES - since he quit taking fluticasone about 6 months ago - left side.   Congestion: YES  Sore Throat: no  Cough: YES-productive of clear sputum, with shortness of breath  Wheeze: YES  Decreased Appetite:  "no  Nausea: no  Vomiting: no  Diarrhea: no  Dysuria/Freq.: no  Dysuria or Hematuria: no  Fatigue/Achiness: YES  Sick/Strep Exposure: no  Therapies tried and outcome: increased fluids, albuterol Inhaler (helping), B12 supplement, Carlene Apple cider vinegar, Zyrtec (helps but doesn't last)    He has a history of allergies and asthma  Wife is concerned about bacterial infection        Review of Systems   Constitutional, HEENT, pulm systems are negative, except as otherwise noted.      Objective    BP (!) 198/125 (BP Location: Right arm, Patient Position: Sitting, Cuff Size: Adult Regular)   Pulse 82   Temp 99  F (37.2  C) (Tympanic)   Resp 17   Ht 1.88 m (6' 2\")   Wt 123.9 kg (273 lb 3.2 oz)   SpO2 95%   BMI 35.08 kg/m    Body mass index is 35.08 kg/m .  Physical Exam     GENERAL: alert and no distress  EYES: Eyes grossly normal to inspection, PERRL and conjunctivae and sclerae normal  HENT: ear canals and TMs normal, sinuses non tender to percussion, nose and mouth without ulcers or lesions, oropharynx normal  NECK: no adenopathy, no asymmetry, masses, or scars  RESP: diffuse mild wheezing but more prominent course sounds in left base  CV: regular rate and rhythm, normal S1 S2, no S3 or S4, no murmur, click or rub     Results for orders placed or performed in visit on 05/05/21 (from the past 24 hour(s))   XR Chest 2 Views    Narrative    CHEST TWO VIEW   5/5/2021 5:19 PM     HISTORY: Abnormal lung sounds in left base; Cough.    COMPARISON: Chest x-ray 4/16/2015.      Impression    IMPRESSION: PA and lateral views of the chest. Lungs are clear. Heart  is normal in size. No effusions are evident. No pneumothorax.  Degenerative spine changes are noted.    PASTORA MCBRIDE MD               "

## 2021-05-05 NOTE — PATIENT INSTRUCTIONS
I don't see any pneumonia on your x-ray, so it doesn't look like you need antibiotics.  Symptoms may be due to asthma exacerbation from allergies and/or a viral infection.

## 2021-07-15 ENCOUNTER — TELEPHONE (OUTPATIENT)
Dept: FAMILY MEDICINE | Facility: CLINIC | Age: 66
End: 2021-07-15

## 2021-07-15 NOTE — TELEPHONE ENCOUNTER
Should have visit to further evaluate symptoms, please advise him to schedule.    Thanks,  Todd Dean MD

## 2021-07-15 NOTE — TELEPHONE ENCOUNTER
Reason for Call:  Other prescription    Detailed comments: Patient wife is calling requesting for a Prednisone 40 mg for patients allergy. Wife stated Dr. Dean prescribed this medication to patient last 05/05/2021 during patients visit.     Phone Number Patient can be reached at: Cell number on file:    Telephone Information:   Mobile [phone]       Best Time: any    Can we leave a detailed message on this number? YES    Call taken on 7/15/2021 at 9:52 AM by Opal Rose

## 2021-07-16 ENCOUNTER — HOSPITAL ENCOUNTER (EMERGENCY)
Facility: CLINIC | Age: 66
Discharge: HOME | End: 2021-07-16
Attending: NURSE PRACTITIONER | Admitting: NURSE PRACTITIONER
Payer: COMMERCIAL

## 2021-07-16 VITALS
HEIGHT: 73 IN | OXYGEN SATURATION: 96 % | BODY MASS INDEX: 35.78 KG/M2 | WEIGHT: 270 LBS | TEMPERATURE: 98.5 F | SYSTOLIC BLOOD PRESSURE: 190 MMHG | RESPIRATION RATE: 20 BRPM | DIASTOLIC BLOOD PRESSURE: 111 MMHG | HEART RATE: 79 BPM

## 2021-07-16 DIAGNOSIS — J30.2 SEASONAL ALLERGIC RHINITIS, UNSPECIFIED TRIGGER: ICD-10-CM

## 2021-07-16 DIAGNOSIS — J45.901 EXACERBATION OF ASTHMA, UNSPECIFIED ASTHMA SEVERITY, UNSPECIFIED WHETHER PERSISTENT: ICD-10-CM

## 2021-07-16 PROCEDURE — G0463 HOSPITAL OUTPT CLINIC VISIT: HCPCS | Performed by: NURSE PRACTITIONER

## 2021-07-16 PROCEDURE — 99214 OFFICE O/P EST MOD 30 MIN: CPT | Performed by: NURSE PRACTITIONER

## 2021-07-16 RX ORDER — PREDNISONE 20 MG/1
TABLET ORAL
Qty: 10 TABLET | Refills: 0 | Status: SHIPPED | OUTPATIENT
Start: 2021-07-16 | End: 2022-03-30

## 2021-07-16 ASSESSMENT — MIFFLIN-ST. JEOR: SCORE: 2068.59

## 2021-07-16 NOTE — DISCHARGE INSTRUCTIONS
Prednisone 40 mg daily for 5 days.  Continue to use your inhaler as prescribed.  Return for increased work of breathing, chest pain, or worse in any way.

## 2021-07-16 NOTE — ED PROVIDER NOTES
History     Chief Complaint   Patient presents with     Cough     history of seasonal allergies. requesting prednisone.     HPI  Jayme Selby is a 66 year old male who presents to urgent care for evaluation of increased congestion, cough, and chest tightness for the last couple weeks.  Patient reports a chronic allergic rhinitis and asthma.  He reports seasonal allergies that get worse in the spring with pollen and typically needs prednisone and has increased use of his albuterol inhaler during that time.  He improved last month, but with ragweed increasing he is symptoms worsen.  He has been using his inhaler several times a day.  He he denies any significant shortness of breath.  Denies fevers or chills.    Allergies:  Allergies   Allergen Reactions     Advair Diskus Other (See Comments)     Adverse reaction per previous medical records.  Vision changes     Amlodipine Cough     Flu Like Symptoms       Lisinopril Cough     Per patient.     Losartan Cough     Flu like symptoms       Chlorthalidone Other (See Comments) and Rash     Lesions on Face, Chest Pressure         Problem List:    Patient Active Problem List    Diagnosis Date Noted     Obesity (BMI 35.0-39.9) with comorbidity (H) 04/19/2019     Priority: Medium     Sigmoid diverticulitis 06/28/2016     Priority: Medium     Essential hypertension with goal blood pressure less than 140/90 06/23/2016     Priority: Medium     Mild intermittent asthma without complication 10/27/2015     Priority: Medium     Diagnosis updated by automated process. Provider to review and confirm.       Allergic rhinitis due to animal dander      Priority: Medium     12/5/14 IgE tests pos. only for cat/dog/T (all other environmental allergens NEGATIVE)       Seasonal allergic rhinitis      Priority: Medium     Diagnostic skin and sensitization tests (aka ALLERGENS)      Priority: Medium     Sinusitis, chronic 10/23/2014     Priority: Medium     Advanced directives,  counseling/discussion 04/22/2014     Priority: Medium     Patient does not have an Advance/Health Care Directive (HCD), declines information/referral.    Gracy Byers  April 22, 2014         Hyperlipidemia LDL goal <130 02/25/2014     Priority: Medium     Score not calculated. Missing: Total Cholesterol, HDL  Based history of hypertension and smoking and age has greater than 2 risk factors for CHD.       Tobacco use disorder 02/20/2014     Priority: Medium     Allergic rhinitis due to pollen 02/20/2014     Priority: Medium        Past Medical History:    Past Medical History:   Diagnosis Date     Allergic rhinitis due to animal dander      Diagnostic skin and sensitization tests (aka ALLERGENS) 12/5/14 IgE tests pos. only for cat/dog/T (all other environmental allergens NEGATIVE)     GERD (gastroesophageal reflux disease)      H. pylori infection 1999     Seasonal allergic rhinitis        Past Surgical History:    Past Surgical History:   Procedure Laterality Date     CHOLECYSTECTOMY  09/1999     ETHMOIDECTOMY  5/7/2014    Procedure: ETHMOIDECTOMY;  Surgeon: Kai Sandoval MD;  Location: [facility]     ETHMOIDECTOMY  6/4/2014    Procedure: ETHMOIDECTOMY;  Surgeon: Kai Sandoval MD;  Location: [facility]     TONSILLECTOMY       TYMPANOTOMY EXPLORATORY Left 1/29/2015    Procedure: TYMPANOTOMY EXPLORATORY;  Surgeon: Michel He MD;  Location: [facility]       Family History:    Family History   Problem Relation Age of Onset     Diabetes Mother      LUNG DISEASE Mother      Hypertension Father      C.A.D. Father      Hyperlipidemia Father      Seasonal/Environmental Allergies Sister        Social History:  Marital Status:   [2]  Social History     Tobacco Use     Smoking status: Current Every Day Smoker     Packs/day: 0.50     Years: 40.00     Pack years: 20.00     Types: Cigarettes     Smokeless tobacco: Never Used     Tobacco comment: smokes natural cigerettes not in box   Substance Use Topics      "Alcohol use: Yes     Comment: 5-8 drinks 1 day per week     Drug use: No        Medications:    predniSONE (DELTASONE) 20 MG tablet  albuterol (PROAIR HFA) 108 (90 Base) MCG/ACT inhaler  albuterol (PROAIR HFA/PROVENTIL HFA/VENTOLIN HFA) 108 (90 Base) MCG/ACT inhaler  ascorbic acid (VITAMIN C) 250 MG CHEW  calcium carbonate (TUMS) 500 MG chewable tablet  cetirizine (ZYRTEC ALLERGY) 10 MG tablet  DM-Doxylamine-Acetaminophen (NYQUIL COLD & FLU PO)  fluticasone (FLONASE) 50 MCG/ACT nasal spray  montelukast (SINGULAIR) 10 MG tablet  multivitamin w/minerals (MULTI-VITAMIN) tablet          Review of Systems  As mentioned above in the history present illness. All other systems were reviewed and are negative.    Physical Exam   BP: (!) 190/111  Pulse: 79  Temp: 98.5  F (36.9  C)  Resp: 20  Height: 185.4 cm (6' 1\")  Weight: 122.5 kg (270 lb)  SpO2: 96 %      Physical Exam    GENERAL APPEARANCE: alert and oriented. NAD.   EYES: conjunctiva clear  HENT: bilateral ear canals clear, intact, and without inflammation. Right TM normal. Left TM normal. Nose normal.  Oropharynx without ulcers, erythema or lesions  NECK: supple, nontender, no lymphadenopathy  RESP: lungs clear to auscultation, but diminished in bilateral bases.- no rales, rhonchi or wheezes  CV: regular rates and rhythm, normal S1 S2, no murmur noted      ED Course        Procedures              No results found for this or any previous visit (from the past 24 hour(s)).    Medications - No data to display    Assessments & Plan (with Medical Decision Making)   History and exam consistent with allergic rhinitis and asthma exacerbation.  He is in no respiratory distress.  No hypoxia.  No strong indication for imaging at this time.  Patient will be treated with a course of prednisone.  He was instructed to continue use of his rescue inhaler as prescribed.  Patient instructed return for fevers, shortness of breath, chest pain, or worse in any way.    I have reviewed the " nursing notes.    I have reviewed the findings, diagnosis, plan and need for follow up with the patient.      Discharge Medication List as of 7/16/2021  6:16 PM      START taking these medications    Details   predniSONE (DELTASONE) 20 MG tablet Take two tablets (= 40mg) each day for 5 (five) days, Disp-10 tablet, R-0, E-Prescribe             Final diagnoses:   Seasonal allergic rhinitis, unspecified trigger   Exacerbation of asthma, unspecified asthma severity, unspecified whether persistent       7/16/2021   [facility] EMERGENCY DEPT     Nilam, STEPAN Mclean CNP  07/16/21 0868

## 2021-07-19 ENCOUNTER — TELEPHONE (OUTPATIENT)
Dept: FAMILY MEDICINE | Facility: CLINIC | Age: 66
End: 2021-07-19

## 2021-07-19 ENCOUNTER — OFFICE VISIT (OUTPATIENT)
Dept: FAMILY MEDICINE | Facility: CLINIC | Age: 66
End: 2021-07-19
Payer: COMMERCIAL

## 2021-07-19 VITALS
BODY MASS INDEX: 35.65 KG/M2 | HEIGHT: 73 IN | HEART RATE: 77 BPM | OXYGEN SATURATION: 96 % | RESPIRATION RATE: 18 BRPM | TEMPERATURE: 98.6 F | SYSTOLIC BLOOD PRESSURE: 172 MMHG | DIASTOLIC BLOOD PRESSURE: 118 MMHG | WEIGHT: 269 LBS

## 2021-07-19 DIAGNOSIS — J34.89 SINUS DRAINAGE: ICD-10-CM

## 2021-07-19 DIAGNOSIS — Z12.11 SPECIAL SCREENING FOR MALIGNANT NEOPLASMS, COLON: ICD-10-CM

## 2021-07-19 DIAGNOSIS — I10 BENIGN ESSENTIAL HYPERTENSION: ICD-10-CM

## 2021-07-19 DIAGNOSIS — Z72.0 TOBACCO ABUSE: ICD-10-CM

## 2021-07-19 DIAGNOSIS — J45.21 MILD INTERMITTENT ASTHMA WITH ACUTE EXACERBATION: Primary | ICD-10-CM

## 2021-07-19 PROCEDURE — 99214 OFFICE O/P EST MOD 30 MIN: CPT | Performed by: FAMILY MEDICINE

## 2021-07-19 ASSESSMENT — MIFFLIN-ST. JEOR: SCORE: 2064.06

## 2021-07-19 NOTE — PATIENT INSTRUCTIONS
For your allergies, I recommend the followin. Use afrin nasal spray twice a day for up to 3 days. This helps open up the sinuses.  2. Use flonase nasal spray 1-2x a day during seasons when you are more prone to allergies.  3. Take zyrtec every day during allergy seasons. It works best when you take it regularly. You can take extra (such as 2-3 tablets per day) if needed, when your allergies are worse.  4. Consider getting a neti pot or other nasal wash system (they come in teapot styles and squeeze bottle styles). It's important to always use previously filtered or boiled water (or bottled drinking water) to avoid a very rare but dangerous type of water-borne infection. Follow the instructions that come with the product. You can do this approximately once per day, or however often you need for relief.

## 2021-07-19 NOTE — TELEPHONE ENCOUNTER
Panel Management Review      Patient has the following on his problem list:       Composite cancer screening  Chart review shows that this patient is due/due soon for the following Fecal Colorectal (FIT)  Summary:    Patient is due/failing the following:   FIT    Action needed:   Patient was given fit test at appt 7-19-21    Type of outreach:    call patient in 1 mos to see if completed     Questions for provider review:    None                                                                                                                                    Florence Britton CMA     Chart routed to Care Team .

## 2021-07-19 NOTE — PROGRESS NOTES
Assessment & Plan     Mild intermittent asthma with acute exacerbation  Advised to use controller inhaler during exacerbating seasons. Bad rxn to advair but willing to try a different ICS  - beclomethasone HFA (QVAR REDIHALER) 80 MCG/ACT inhaler  Dispense: 10.6 g; Refill: 3    Special screening for malignant neoplasms, colon  - Fecal colorectal cancer screen (FIT)    Sinus drainage  Sinus cares as in pt instructions. Reports he may try the navage.  - Otolaryngology Referral    Benign essential hypertension - uncontrolled  - continues to decline antihypertensive meds due to past adverse effects on multiple agents  - discussed potential future studies to assess for end organ damage  - we discussed modifiable contributing factors (smoking, movement, ?sleep apnea)  - interested but Sleep study declined for now due to inconvenient locations    Tobacco abuse  precontemplative   reports that he has been smoking cigarettes. He has a 20.00 pack-year smoking history. He has never used smokeless tobacco.    Patient Instructions   For your allergies, I recommend the followin. Use afrin nasal spray twice a day for up to 3 days. This helps open up the sinuses.  2. Use flonase nasal spray 1-2x a day during seasons when you are more prone to allergies.  3. Take zyrtec every day during allergy seasons. It works best when you take it regularly. You can take extra (such as 2-3 tablets per day) if needed, when your allergies are worse.  4. Consider getting a neti pot or other nasal wash system (they come in teapot styles and squeeze bottle styles). It's important to always use previously filtered or boiled water (or bottled drinking water) to avoid a very rare but dangerous type of water-borne infection. Follow the instructions that come with the product. You can do this approximately once per day, or however often you need for relief.    Return if symptoms worsen or fail to improve.    Penny Brooks MD  [facility]  "[place]          Subjective   Phillip is a 66 year old who presents for the following health issues  accompanied by his self:    HPI     Acute Illness  Acute illness concerns: patient has congestion in the AM and runny nose constant has trouble breathing at time from drainage    Onset/Duration: ongoing   Symptoms:  Fever: no  Chills/Sweats: no  Headache (location?): no  Sinus Pressure: no  Conjunctivitis:  no  Ear Pain: no  Rhinorrhea: YES  Congestion: in the AM  Sore Throat: no  Cough: YES - with drainage   Wheeze: YES- at times   Decreased Appetite: no  Nausea: no  Vomiting: no  Diarrhea: no  Dysuria/Freq.: no  Dysuria or Hematuria: no  Fatigue/Achiness: no  Sick/Strep Exposure: no  Therapies tried and outcome: patient does use prednisone, zyrtec, Flonase and albuterol      Feels like he has tried \"a dozen\" different blood pressure medications and always had bad side effects  Smokes tobacco - self-rolls a natural tobacco  Drinks only a couple times per week    Gets a lot of drainage from his L nostril, always dripping, he sleeps on belly sometimes   Has had allergy testing and nasal polyp removal, ?5-6years ago?  Has 3 cats but feels like he is not allergic  Definitely notes seasonal allergies and full relief of symptoms in winter    Review of Systems   Constitutional, HEENT, cardiovascular, pulmonary, gi and gu systems are negative, except as otherwise noted.        Objective    BP (!) 172/118   Pulse 77   Temp 98.6  F (37  C) (Tympanic)   Resp 18   Ht 1.854 m (6' 1\")   Wt 122 kg (269 lb)   SpO2 96%   BMI 35.49 kg/m    Body mass index is 35.49 kg/m .  Physical Exam   GENERAL: healthy, alert and no distress  RESP: normal respiratory effort, speaking in complete sentences  PSYCH: normal affect, insight          "

## 2021-07-20 ENCOUNTER — LAB (OUTPATIENT)
Dept: LAB | Facility: CLINIC | Age: 66
End: 2021-07-20
Payer: COMMERCIAL

## 2021-07-20 DIAGNOSIS — Z12.11 SPECIAL SCREENING FOR MALIGNANT NEOPLASMS, COLON: ICD-10-CM

## 2021-07-20 PROCEDURE — 82274 ASSAY TEST FOR BLOOD FECAL: CPT

## 2021-07-20 ASSESSMENT — ASTHMA QUESTIONNAIRES: ACT_TOTALSCORE: 17

## 2021-07-28 LAB — HEMOCCULT STL QL IA: NEGATIVE

## 2021-10-03 ENCOUNTER — HEALTH MAINTENANCE LETTER (OUTPATIENT)
Age: 66
End: 2021-10-03

## 2022-03-11 ENCOUNTER — HOSPITAL ENCOUNTER (EMERGENCY)
Facility: CLINIC | Age: 67
Discharge: HOME | End: 2022-03-11
Attending: FAMILY MEDICINE | Admitting: FAMILY MEDICINE
Payer: COMMERCIAL

## 2022-03-11 VITALS
SYSTOLIC BLOOD PRESSURE: 173 MMHG | OXYGEN SATURATION: 97 % | BODY MASS INDEX: 34.65 KG/M2 | HEART RATE: 93 BPM | WEIGHT: 270 LBS | RESPIRATION RATE: 18 BRPM | HEIGHT: 74 IN | TEMPERATURE: 98.2 F | DIASTOLIC BLOOD PRESSURE: 107 MMHG

## 2022-03-11 DIAGNOSIS — J45.21 MILD INTERMITTENT ASTHMA WITH ACUTE EXACERBATION: ICD-10-CM

## 2022-03-11 DIAGNOSIS — I10 HYPERTENSION, UNSPECIFIED TYPE: ICD-10-CM

## 2022-03-11 DIAGNOSIS — J45.20 MILD INTERMITTENT ASTHMA WITHOUT COMPLICATION: ICD-10-CM

## 2022-03-11 DIAGNOSIS — J01.90 ACUTE SINUSITIS WITH SYMPTOMS > 10 DAYS: ICD-10-CM

## 2022-03-11 DIAGNOSIS — J30.2 SEASONAL ALLERGIC RHINITIS, UNSPECIFIED TRIGGER: ICD-10-CM

## 2022-03-11 PROCEDURE — 99284 EMERGENCY DEPT VISIT MOD MDM: CPT | Performed by: FAMILY MEDICINE

## 2022-03-11 RX ORDER — MONTELUKAST SODIUM 10 MG/1
10 TABLET ORAL AT BEDTIME
Qty: 90 TABLET | Refills: 3 | Status: SHIPPED | OUTPATIENT
Start: 2022-03-11 | End: 2022-05-11

## 2022-03-11 RX ORDER — FLUTICASONE PROPIONATE 50 MCG
SPRAY, SUSPENSION (ML) NASAL
Qty: 16 G | Refills: 10 | Status: SHIPPED | OUTPATIENT
Start: 2022-03-11 | End: 2023-05-10

## 2022-03-11 RX ORDER — ALBUTEROL SULFATE 90 UG/1
AEROSOL, METERED RESPIRATORY (INHALATION)
Qty: 6.7 G | Refills: 11 | Status: SHIPPED | OUTPATIENT
Start: 2022-03-11 | End: 2023-05-10

## 2022-03-11 RX ORDER — AMOXICILLIN 500 MG/1
500 CAPSULE ORAL 3 TIMES DAILY
Qty: 30 CAPSULE | Refills: 0 | Status: SHIPPED | OUTPATIENT
Start: 2022-03-11 | End: 2022-03-21

## 2022-03-11 NOTE — PROGRESS NOTES
"Pt with sinus pressure and nasal drainage. Noted to be hypertensive. Pt states \" I will not take medication for blood pressure. It makes me sick \"  Will monitor B/P here. MD notified.  "

## 2022-03-11 NOTE — ED PROVIDER NOTES
HPI   The patient is a 67-year-old male presenting with concern for recurrent sinusitis.  Known history of sinusitis with polyp removal by ENT years ago.  The patient has allergic rhinitis.  Known history of hypertension, untreated because of multiple medication side effects and allergies.  The patient has had facial pain on the right with green nasal discharge over the past 10 days.  He experienced rhinorrhea even prior to the above sinus symptoms.  No fever.  No new cough or congestion in the chest.  No shortness of breath.  No localized dental pain.  No trauma or injury.        Allergies:  Allergies   Allergen Reactions     Advair Diskus Other (See Comments)     Adverse reaction per previous medical records.  Vision changes     Amlodipine Cough     Flu Like Symptoms       Lisinopril Cough     Per patient.     Losartan Cough     Flu like symptoms       Chlorthalidone Other (See Comments) and Rash     Lesions on Face, Chest Pressure       Problem List:    Patient Active Problem List    Diagnosis Date Noted     Obesity (BMI 35.0-39.9) with comorbidity (H) 04/19/2019     Priority: Medium     Sigmoid diverticulitis 06/28/2016     Priority: Medium     Essential hypertension with goal blood pressure less than 140/90 06/23/2016     Priority: Medium     Mild intermittent asthma without complication 10/27/2015     Priority: Medium     Diagnosis updated by automated process. Provider to review and confirm.       Allergic rhinitis due to animal dander      Priority: Medium     12/5/14 IgE tests pos. only for cat/dog/T (all other environmental allergens NEGATIVE)       Seasonal allergic rhinitis      Priority: Medium     Diagnostic skin and sensitization tests (aka ALLERGENS)      Priority: Medium     Sinusitis, chronic 10/23/2014     Priority: Medium     Advanced directives, counseling/discussion 04/22/2014     Priority: Medium     Patient does not have an Advance/Health Care Directive (HCD), declines  information/referral.    Gracy Byers  April 22, 2014         Hyperlipidemia LDL goal <130 02/25/2014     Priority: Medium     Score not calculated. Missing: Total Cholesterol, HDL  Based history of hypertension and smoking and age has greater than 2 risk factors for CHD.       Tobacco use disorder 02/20/2014     Priority: Medium     Allergic rhinitis due to pollen 02/20/2014     Priority: Medium      Past Medical History:    Past Medical History:   Diagnosis Date     Allergic rhinitis due to animal dander      Diagnostic skin and sensitization tests (aka ALLERGENS) 12/5/14 IgE tests pos. only for cat/dog/T (all other environmental allergens NEGATIVE)     GERD (gastroesophageal reflux disease)      H. pylori infection 1999     Seasonal allergic rhinitis      Past Surgical History:    Past Surgical History:   Procedure Laterality Date     CHOLECYSTECTOMY  09/1999     ETHMOIDECTOMY  5/7/2014    Procedure: ETHMOIDECTOMY;  Surgeon: Kai Sandoval MD;  Location: [facility] OR     ETHMOIDECTOMY  6/4/2014    Procedure: ETHMOIDECTOMY;  Surgeon: Kai Sandoval MD;  Location: [facility] OR     TONSILLECTOMY       TYMPANOTOMY EXPLORATORY Left 1/29/2015    Procedure: TYMPANOTOMY EXPLORATORY;  Surgeon: Michel He MD;  Location:  OR     Family History:    Family History   Problem Relation Age of Onset     Diabetes Mother      LUNG DISEASE Mother      Hypertension Father      C.A.D. Father      Hyperlipidemia Father      Seasonal/Environmental Allergies Sister      Social History:  Marital Status:   [2]  Social History     Tobacco Use     Smoking status: Current Every Day Smoker     Packs/day: 0.50     Years: 40.00     Pack years: 20.00     Types: Cigarettes     Smokeless tobacco: Never Used     Tobacco comment: smokes natural cigerettes not in box   Substance Use Topics     Alcohol use: Yes     Comment: 5-8 drinks 1 day per week     Drug use: No      Medications:    albuterol (PROAIR HFA) 108 (90 Base)  "MCG/ACT inhaler  amoxicillin (AMOXIL) 500 MG capsule  beclomethasone HFA (QVAR REDIHALER) 80 MCG/ACT inhaler  fluticasone (FLONASE) 50 MCG/ACT nasal spray  montelukast (SINGULAIR) 10 MG tablet  ascorbic acid (VITAMIN C) 250 MG CHEW  calcium carbonate (TUMS) 500 MG chewable tablet  cetirizine (ZYRTEC ALLERGY) 10 MG tablet  DM-Doxylamine-Acetaminophen (NYQUIL COLD & FLU PO)  multivitamin w/minerals (MULTI-VITAMIN) tablet  predniSONE (DELTASONE) 20 MG tablet  Zinc Sulfate (ZINC 15 PO)      Review of Systems   All other systems reviewed and are negative.      PE   BP: (!) 199/119  Pulse: 99  Temp: 98.2  F (36.8  C)  Resp: 18  Height: 188 cm (6' 2\")  Weight: 122.5 kg (270 lb)  SpO2: 96 %  Physical Exam  Vitals and nursing note reviewed.   Constitutional:       General: He is not in acute distress.  HENT:      Head: Atraumatic.      Right Ear: External ear normal.      Left Ear: External ear normal.      Nose: Nose normal.      Mouth/Throat:      Mouth: Mucous membranes are moist.      Pharynx: Oropharynx is clear.   Eyes:      General: No scleral icterus.     Extraocular Movements: Extraocular movements intact.      Conjunctiva/sclera: Conjunctivae normal.      Pupils: Pupils are equal, round, and reactive to light.   Cardiovascular:      Rate and Rhythm: Normal rate.   Pulmonary:      Effort: Pulmonary effort is normal. No respiratory distress.   Musculoskeletal:         General: Normal range of motion.      Cervical back: Normal range of motion.   Skin:     General: Skin is warm and dry.   Neurological:      Mental Status: He is alert and oriented to person, place, and time.   Psychiatric:         Behavior: Behavior normal.         ED COURSE and MDM   1513.  The patient has a history of acute sinusitis.  Known history of allergic rhinitis.  Prescriptions provided.  Follow-up recommended.  Known history of high blood pressure which continues here in the ED.  This may be related to his discomfort about being in the ER " "and having symptoms.  The patient also says, \"yeah it gets this high when I freak out.\"  He reports a blood pressure that is typically about 135 systolic outside the hospital.  That said, he tells me that his primary physician had tried \"like 12-15 different medications before finally giving up on me and decided not to treat my high blood pressure.\"  He denies having had visited with her nephrologist previously.  Referral orders placed.    LABS  Labs Ordered and Resulted from Time of ED Arrival to Time of ED Departure - No data to display    IMAGING  Images reviewed by me.  Radiology report also reviewed.  No orders to display       Procedures    Medications - No data to display      IMPRESSION       ICD-10-CM    1. Acute sinusitis with symptoms > 10 days  J01.90 Otolaryngology Referral   2. Mild intermittent asthma without complication  J45.20 albuterol (PROAIR HFA) 108 (90 Base) MCG/ACT inhaler   3. Mild intermittent asthma with acute exacerbation  J45.21 beclomethasone HFA (QVAR REDIHALER) 80 MCG/ACT inhaler     montelukast (SINGULAIR) 10 MG tablet   4. Seasonal allergic rhinitis, unspecified trigger  J30.2 fluticasone (FLONASE) 50 MCG/ACT nasal spray     montelukast (SINGULAIR) 10 MG tablet     Otolaryngology Referral   5. Hypertension, unspecified type  I10 Adult Nephrology  Referral            Medication List      Started    amoxicillin 500 MG capsule  Commonly known as: AMOXIL  500 mg, Oral, 3 TIMES DAILY        Modified    albuterol 108 (90 Base) MCG/ACT inhaler  Commonly known as: ProAir HFA  INHALE 2 PUFFS INTO THE LUNGS EVERY 4 HOURS AS NEEDED  What changed: Another medication with the same name was removed. Continue taking this medication, and follow the directions you see here.                          Haider Cruz MD  03/11/22 1515    " Statement Selected

## 2022-03-11 NOTE — ED TRIAGE NOTES
Pt reports sinus pain/pressure that has been ongoing for the past few weeks. Pt reports nasal drainage, has been using the nasal lavage with some relief

## 2022-03-11 NOTE — DISCHARGE INSTRUCTIONS
RETURN TO THE EMERGENCY ROOM FOR THE FOLLOWING:    Severely worsened pain, vomiting and dehydration, or at anytime for any concern.    FOLLOW UP:    Consider follow-up with nephrology for further discussion regarding your high blood pressure.  Consider follow-up with otolaryngology if not improved over the next 7 days.    TREATMENT RECOMMENDATIONS:    Amoxicillin 3 times daily over the next 10 days.    NURSE ADVICE LINE:  [phone] or [phone]

## 2022-03-20 ENCOUNTER — HEALTH MAINTENANCE LETTER (OUTPATIENT)
Age: 67
End: 2022-03-20

## 2022-03-30 ENCOUNTER — OFFICE VISIT (OUTPATIENT)
Dept: FAMILY MEDICINE | Facility: CLINIC | Age: 67
End: 2022-03-30
Payer: MEDICARE

## 2022-03-30 VITALS
RESPIRATION RATE: 14 BRPM | DIASTOLIC BLOOD PRESSURE: 98 MMHG | OXYGEN SATURATION: 95 % | HEIGHT: 74 IN | SYSTOLIC BLOOD PRESSURE: 162 MMHG | TEMPERATURE: 99.9 F | WEIGHT: 276.6 LBS | BODY MASS INDEX: 35.5 KG/M2 | HEART RATE: 100 BPM

## 2022-03-30 DIAGNOSIS — F17.200 TOBACCO USE DISORDER: ICD-10-CM

## 2022-03-30 DIAGNOSIS — J45.21 MILD INTERMITTENT ASTHMA WITH ACUTE EXACERBATION: ICD-10-CM

## 2022-03-30 DIAGNOSIS — J34.89 SINUS DRAINAGE: Primary | ICD-10-CM

## 2022-03-30 DIAGNOSIS — E66.01 MORBID OBESITY (H): ICD-10-CM

## 2022-03-30 DIAGNOSIS — I10 ESSENTIAL HYPERTENSION WITH GOAL BLOOD PRESSURE LESS THAN 140/90: Chronic | ICD-10-CM

## 2022-03-30 PROCEDURE — 99214 OFFICE O/P EST MOD 30 MIN: CPT | Mod: 25 | Performed by: INTERNAL MEDICINE

## 2022-03-30 PROCEDURE — 90471 IMMUNIZATION ADMIN: CPT | Performed by: INTERNAL MEDICINE

## 2022-03-30 PROCEDURE — 90715 TDAP VACCINE 7 YRS/> IM: CPT | Performed by: INTERNAL MEDICINE

## 2022-03-30 RX ORDER — DOXYCYCLINE HYCLATE 100 MG
100 TABLET ORAL 2 TIMES DAILY
Qty: 14 TABLET | Refills: 0 | Status: SHIPPED | OUTPATIENT
Start: 2022-03-30 | End: 2022-04-06

## 2022-03-30 RX ORDER — IPRATROPIUM BROMIDE 21 UG/1
2 SPRAY, METERED NASAL 2 TIMES DAILY
Qty: 30 ML | Refills: 11 | Status: SHIPPED | OUTPATIENT
Start: 2022-03-30 | End: 2023-05-10

## 2022-03-30 ASSESSMENT — PATIENT HEALTH QUESTIONNAIRE - PHQ9
SUM OF ALL RESPONSES TO PHQ QUESTIONS 1-9: 1
10. IF YOU CHECKED OFF ANY PROBLEMS, HOW DIFFICULT HAVE THESE PROBLEMS MADE IT FOR YOU TO DO YOUR WORK, TAKE CARE OF THINGS AT HOME, OR GET ALONG WITH OTHER PEOPLE: NOT DIFFICULT AT ALL
SUM OF ALL RESPONSES TO PHQ QUESTIONS 1-9: 1

## 2022-03-30 ASSESSMENT — ASTHMA QUESTIONNAIRES
QUESTION_2 LAST FOUR WEEKS HOW OFTEN HAVE YOU HAD SHORTNESS OF BREATH: ONCE OR TWICE A WEEK
QUESTION_5 LAST FOUR WEEKS HOW WOULD YOU RATE YOUR ASTHMA CONTROL: COMPLETELY CONTROLLED
ACT_TOTALSCORE: 23
QUESTION_4 LAST FOUR WEEKS HOW OFTEN HAVE YOU USED YOUR RESCUE INHALER OR NEBULIZER MEDICATION (SUCH AS ALBUTEROL): ONCE A WEEK OR LESS
QUESTION_1 LAST FOUR WEEKS HOW MUCH OF THE TIME DID YOUR ASTHMA KEEP YOU FROM GETTING AS MUCH DONE AT WORK, SCHOOL OR AT HOME: NONE OF THE TIME
QUESTION_3 LAST FOUR WEEKS HOW OFTEN DID YOUR ASTHMA SYMPTOMS (WHEEZING, COUGHING, SHORTNESS OF BREATH, CHEST TIGHTNESS OR PAIN) WAKE YOU UP AT NIGHT OR EARLIER THAN USUAL IN THE MORNING: NOT AT ALL
ACT_TOTALSCORE: 23

## 2022-03-30 ASSESSMENT — PAIN SCALES - GENERAL: PAINLEVEL: NO PAIN (0)

## 2022-03-30 NOTE — PATIENT INSTRUCTIONS
Try using the Atrovent nasal spray along with the Flonase to see if the combination helps the runny nose.  Doing a sinus rinse before using the nose sprays can help make them more effective.      Increase the Qvar to twice per day so that hopefully you won't need to use the albuterol as often.

## 2022-03-30 NOTE — PROGRESS NOTES
"  Assessment & Plan     Sinus drainage    Phillip presents with ongoing sinus and nasal drainage that did not improve on amoxicillin.  Symptoms may be more allergic in etiology than infection, but he'd like to try a 2nd antibiotic to see if that helps.  Prescription sent for doxy.  Recommended trying Atrovent in combo with Flonase and using those after sinus rinses, though he doesn't seem to interested in the sinus rinses.  He is on Zyrtec and the ER prescribed him Singulair, but he didn't want to take the latter after reading about the possible side effects.  He'd like to follow-up with ENT if not improving, referral placed.      - Otolaryngology Referral; Future  - ipratropium (ATROVENT) 0.03 % nasal spray; Spray 2 sprays into both nostrils 2 times daily  - doxycycline hyclate (VIBRA-TABS) 100 MG tablet; Take 1 tablet (100 mg) by mouth 2 times daily for 7 days    Mild intermittent asthma with acute exacerbation    Not controlled, only using Qvar once daily so recommended increasing this to BID as prescribed.  Follow-up if not improving.     - beclomethasone HFA (QVAR REDIHALER) 80 MCG/ACT inhaler; Inhale 1 puff into the lungs 2 times daily During allergy seasons    Morbid obesity (H)    Known issue that I take into account for their medical decisions; no current exacerbations or new concerns.     Essential hypertension with goal blood pressure less than 140/90    Not controlled.  He has tried many medications in the past with side effects and is not interested in trying more.  States that high blood pressure is \"something I am going to take to the grave with me\".      Tobacco use disorder    Discussed lung cancer screening, he declined.    He also declined lipid, HIV, and Hep C screening.          Tobacco Cessation:   reports that he has been smoking cigarettes. He has a 20.00 pack-year smoking history. He has never used smokeless tobacco.  Tobacco Cessation Action Plan: Information offered: Patient not interested " at this time      Todd Dean MD  [facility]    Ivan Fisher is a 67 year old who presents for the following health issues       ED/UC Followup:    Facility:  [facility] ED  Date of visit: 03/11/2022  Reason for visit: acute sinusitis  Current Status: completed the 10 days of amoxicillin but that didn't seem to do a whole lot. The past couple years his left side of his nose has been draining pretty nonstop, clear fluid. Has used flonase and it stops long enough for him to fall asleep but he wakes up in the middle of the night with a drenched pillow that he needs to swap. His head is having pain when he blows his nose now and has a lot of drainage currenlty. He thinks he may need an ENT referral and is afraid the infection might be coming with the season change. He says he hasn't had any fevers but has had some chills since leaving ED.    Singulair was prescribed in the ED, but he is not taking this but he was concerned after reading the possible side effects.  Taking Zyrtec.     He has been doing sinus rinses but doesn't feel that they help much.      Sinus CT was done in 2015:   IMPRESSION  IMPRESSION: Improvement in mucosal thickening in the paranasal sinuses  and nasal cavity bilaterally since the previous exam. 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    He had polyp surgery in the past and symptoms were doing okay for a couple of years.         Medication Followup of qvar redihaler    Taking Medication as prescribed: no- only using once per day.  He taking albuterol once per day.      Side Effects:  Gets a headache in the morning but isn't sure if it is because of this    Medication Helping Symptoms:  yes        Review of Systems   Constitutional, HEENT, pulmonary systems are negative, except as otherwise noted.      Objective    BP (!) 162/98 (BP Location: Right arm, Patient Position: Sitting, Cuff Size: Adult Large)   Pulse 100   Temp 99.9  F  "(37.7  C) (Tympanic)   Resp 14   Ht 1.88 m (6' 2\")   Wt 125.5 kg (276 lb 9.6 oz)   SpO2 95%   BMI 35.51 kg/m    Body mass index is 35.51 kg/m .  Physical Exam   GENERAL: healthy, alert and no distress  HENT: no sinus pain to percussion.  Significant clear rhinorrhea noted            "

## 2022-04-12 ENCOUNTER — TELEPHONE (OUTPATIENT)
Dept: FAMILY MEDICINE | Facility: CLINIC | Age: 67
End: 2022-04-12
Payer: MEDICARE

## 2022-04-12 NOTE — TELEPHONE ENCOUNTER
Patient Quality Outreach    Patient is due for the following:   Physical  - due    NEXT STEPS:   Schedule a yearly physical    Type of outreach:    Phone, left message for patient/parent to call back.    Next Steps:  Reach out within 90 days via [employer].    Max number of attempts reached: No. Will try again in 90 days if patient still on fail list.    Questions for provider review:    None     Iris Britton, CMA

## 2022-05-07 NOTE — PROGRESS NOTES
Chief Complaint   Patient presents with     Ent Problem     Check nose/constant runny nose - yellow drainage/ Left head pain/hx of sinus/polyp surgery- Dr. Sandoval/also hx left pet 2014? Dr. He  Has been on 2 antibiotics and Doxycycline did help some      History of Present Illness   Jayme Selby is a 67 year old male who presents for nose and sinus evaluation. I am seeing this patient in consultation for sinus drainage at the request of the provider Dr. Dean. The patient has a fairly complex past ear and sinus history.  The patient has a history of recurrent acute sinusitis and underwent imaging for surgical planning on 3/4/2014.  My review of the CT scan shows significant paranasal sinus disease involving the maxillary and ethmoid sinuses.  Near the anterior cribriform plate on the left, there was a concerning area for dehiscence and a very rounded appearing lesion of the anterior skull base that is difficult to see because of the paranasal sinus opacification.  The patient subsequently underwent endoscopic sinus surgery.  My review of the medical record shows the surgery to include maxillary antrostomies, anterior ethmoidectomies, and turbinate reduction.    Patient then had sudden sensorineural hearing change with some conductive component.  He underwent MRI imaging of the brain on 12/5/2014.  He was noted to have middle ear/mastoid effusion on the left.  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    The patient subsequently saw Dr. He and he had recommended a middle ear exploration with a type of round window surgery and ear tube placement for the middle ear effusion.  Prior to surgery, he patient underwent a repeat maxillofacial CT without contrast on 7/2/2015.  My review of the CT showed a mild rightward nasal septal deviation.  The patient had bilateral middle turbinate edda bullosa, the right was opacified at the  time of the scan.  Patient did have bilateral ostiomeatal unit obstruction.  Both maxillary sinuses were unopacified.  There were scattered thickening throughout the ethmoid sinuses.  On the left-hand side, a bit better seen is the area of possible skull base dehiscence with a very round lesion from the skull base which could be a opacified ethmoid air cell versus a myelomeningocele.     The patient states that his ear tube eventually fell out and his ear was doing well.  After his sinus surgery, he had improvement in his recurrent sinus infections.  About a year and a half ago, he started having intermittent clear drainage from his nose that seems to be more prevalent when he bends over, bears down, blows his nose, or sneezes.  He is also been having some left-sided frontal temporal headaches that usually is worse when his nasal drainage is worse.  He denies any nasal obstruction, postnasal drainage, taste or smell changes.  He has used some topical nasal steroid sprays in his nose in the past.  The patient is a daily smoker, roughly 1/2 pack/day.  He reports a history of multiple nasal traumas as a child.  Recently on 2 courses of antibiotics, most recently doxycycline at the beginning of April.  He feels like he is developing another sinus infection.    Past Medical History  Patient Active Problem List   Diagnosis     Tobacco use disorder     Allergic rhinitis due to pollen     Hyperlipidemia LDL goal <130     Advanced directives, counseling/discussion     Sinusitis, chronic     Allergic rhinitis due to animal dander     Seasonal allergic rhinitis     Diagnostic skin and sensitization tests (aka ALLERGENS)     Mild intermittent asthma without complication     Essential hypertension with goal blood pressure less than 140/90     Sigmoid diverticulitis     Obesity (BMI 35.0-39.9) with comorbidity (H)     Current Medications     Current Outpatient Medications:      albuterol (PROAIR HFA) 108 (90 Base) MCG/ACT  inhaler, INHALE 2 PUFFS INTO THE LUNGS EVERY 4 HOURS AS NEEDED, Disp: 6.7 g, Rfl: 11     ascorbic acid (VITAMIN C) 250 MG CHEW, Take 500 mg by mouth daily, Disp: , Rfl:      beclomethasone HFA (QVAR REDIHALER) 80 MCG/ACT inhaler, Inhale 1 puff into the lungs 2 times daily During allergy seasons, Disp: 10.6 g, Rfl: 11     calcium carbonate (TUMS) 500 MG chewable tablet, Take 1 chew tab by mouth as needed for heartburn , Disp: , Rfl:      cetirizine (ZYRTEC) 10 MG tablet, Take 10 mg by mouth daily, Disp: , Rfl:      DM-Doxylamine-Acetaminophen (NYQUIL COLD & FLU PO), Take by mouth as needed Reported on 3/14/2017, Disp: , Rfl:      fluticasone (FLONASE) 50 MCG/ACT nasal spray, USE 2 SPRAYS IN EACH NOSTRIL EVERY DAY AS NEEDED, Disp: 16 g, Rfl: 10     ipratropium (ATROVENT) 0.03 % nasal spray, Spray 2 sprays into both nostrils 2 times daily, Disp: 30 mL, Rfl: 11     multivitamin w/minerals (THERA-VIT-M) tablet, Take 1 tablet by mouth daily, Disp: , Rfl:      predniSONE (DELTASONE) 10 MG tablet, Take 3 tablets (30 mg) by mouth daily for 5 days, Disp: 15 tablet, Rfl: 0     sulfamethoxazole-trimethoprim (BACTRIM DS) 800-160 MG tablet, Take 1 tablet by mouth 2 times daily for 21 days, Disp: 42 tablet, Rfl: 0     Zinc Sulfate (ZINC 15 PO), , Disp: , Rfl:     Allergies  Allergies   Allergen Reactions     Advair Diskus Other (See Comments)     Adverse reaction per previous medical records.  Vision changes     Amlodipine Cough     Flu Like Symptoms       Lisinopril Cough     Per patient.     Losartan Cough     Flu like symptoms       Chlorthalidone Other (See Comments) and Rash     Lesions on Face, Chest Pressure         Social History   Social History     Socioeconomic History     Marital status:    Tobacco Use     Smoking status: Current Every Day Smoker     Packs/day: 0.50     Years: 40.00     Pack years: 20.00     Types: Cigarettes     Smokeless tobacco: Never Used     Tobacco comment: smokes natural cigerettes not in  "box   Vaping Use     Vaping Use: Never used   Substance and Sexual Activity     Alcohol use: Not Currently     Drug use: No     Sexual activity: Never   Other Topics Concern     Parent/sibling w/ CABG, MI or angioplasty before 65F 55M? Yes   Social History Narrative    March 10, 2017        ENVIRONMENTAL HISTORY: [place] is heated with a forced air and gas furnace. They does have central air conditioning. The patient's bedroom is furnished with carpeting in bedroom, allergen mattress cover, allergen pillowcase cover and fabric window coverings. No pets inside the house. There is not history of cockroach or mice infestation. There are 2 smokers in the house.  The house does not have a basement.        Family History  Family History   Problem Relation Age of Onset     Diabetes Mother      LUNG DISEASE Mother      Hypertension Father      C.A.D. Father      Hyperlipidemia Father      Seasonal/Environmental Allergies Sister        Review of Systems  As per HPI and PMHx, otherwise 10+ comprehensive system review is negative.    Physical Exam  BP (!) 155/118 (BP Location: Right arm, Patient Position: Sitting, Cuff Size: Adult Large)   Pulse 97   Temp 99.1  F (37.3  C) (Tympanic)   Ht 1.88 m (6' 2\")   Wt 124.7 kg (275 lb)   BMI 35.31 kg/m    GENERAL: Patient is a pleasant, cooperative 67 year old male in no acute distress.  HEAD: Normocephalic, atraumatic.  Hair and scalp are normal.  EYES: Pupils are equal, round, reactive to light and accommodation.  Extraocular movements are intact.  The sclera nonicteric without injection.  The extraocular structures are normal.  EARS: Normal shape and symmetry.  No tenderness when palpating the mastoid or tragal areas bilaterally.  Otoscopic exam reveals a minimal amount of cerumen bilaterally.  The bilateral tympanic membranes are round, intact without evidence of effusion, good landmarks.  No retraction, granulation, or " drainage.  NOSE: Nares are patent.  Nasal mucosa is pink and moist.  The nasal septum deviates slightly to the right.  The inferior turbinates appear normal.  No nasal cavity masses, polyps, or mucopurulence on anterior rhinoscopy.  I did have the patient perform a Dandy maneuver with slight Valsalva and he had copious clear rhinorrhea come from the left nasal cavity.  I collected the drainage for beta-2 testing.  NEUROLOGIC: Cranial nerves II through XII are grossly intact.  Voice is strong.  Patient is House-Brackmann I/VI bilaterally.  CARDIOVASCULAR: Extremities are warm and well-perfused.  No significant peripheral edema.  RESPIRATORY: Patient has nonlabored breathing without cough, wheeze, stridor.  PSYCHIATRIC: Patient is alert and oriented.  Mood and affect appear normal.  SKIN: Warm and dry.  No scalp, face, or neck lesions noted.    Procedure: Flexible Nasal Endoscopy  Indication: Persistent left-sided rhinorrhea concerning for CSF rhinorrhea    To best visualize the sinonasal anatomy and due to the chief complaint and HPI, I proceeded with flexible fiberoptic nasal endoscopy.  The bilateral nasal cavities were anesthetized and decongested with a mixture of lidocaine and neosynephrine.  The bilateral nasal cavities were then examined using flexible fiberoptic nasal endoscope.  The right nasal cavity was without masses, polyps, or mucopurulence.  The right middle turbinate and middle meatus was normal in appearance.  The sphenoethmoid recess was clear.  The left nasal cavity was without masses, polyps, or mucopurulence.  In the left middle meatus between the middle turbinate and the lateral nasal wall superiorly towards the sphenoethmoid recess, there was a grayish appearing lesion that was difficult to visualize given the narrow aperture between the middle turbinate and lateral nasal wall.  I had the patient Valsalva and there appeared to be some welling of fluid.  This is in the anterior ethmoid area  consistent with the CT imaging previously.  he sphenoethmoid recess was clear.  The sinonasal mucosa appeared normal.  The nasal septum is essentially midline.  The nasopharynx had a normal appearance with normal Eustachian tube openings and fossa of Rosenmuller bilaterally.  Minimal adenoid tissue.  The scope was removed.  The patient tolerated the procedure well.    Assessment and Plan     ICD-10-CM    1. CSF rhinorrhea  G96.01 NASAL ENDOSCOPY, DIAGNOSTIC     Beta 2 transferrin     sulfamethoxazole-trimethoprim (BACTRIM DS) 800-160 MG tablet     predniSONE (DELTASONE) 10 MG tablet     Beta 2 transferrin   2. History of endoscopic sinus surgery  Z98.890 NASAL ENDOSCOPY, DIAGNOSTIC     Beta 2 transferrin     sulfamethoxazole-trimethoprim (BACTRIM DS) 800-160 MG tablet     predniSONE (DELTASONE) 10 MG tablet     Beta 2 transferrin   3. Acute sinusitis with symptoms > 10 days  J01.90 NASAL ENDOSCOPY, DIAGNOSTIC     Beta 2 transferrin     sulfamethoxazole-trimethoprim (BACTRIM DS) 800-160 MG tablet     predniSONE (DELTASONE) 10 MG tablet     Beta 2 transferrin   4. BMI 35.0-35.9,adult  Z68.35    5. Tobacco dependence syndrome  F17.200    6. Encounter for tobacco use cessation counseling  Z71.6      It was my pleasure seeing Jayme Selby today in clinic.  The patient presents with a roughly 18-month history of intermittent clear rhinorrhea from the left nasal cavity concerning for CSF rhinorrhea.  In review of his previous imaging, it looks like he might of had a meningocele or myelo meningocele present even prior to his initial surgery.  The extent of his surgery was quite limited from his pre and postoperative imaging.  There is also suggestion of possible myelomeningocele on his MRI imaging that was performed post surgery.  The patient is having current symptoms concerning for infection.  He had no obvious purulence on examination but obviously is at risk from infection if this is CSF rhinorrhea.  We discussed a  21-day course of Bactrim.  I will give him a burst of prednisone.  I will send his nasal drainage for beta-2 testing.  If beta-2 positive, we need to have him meet with a skull base surgeon to discuss repair.  I will send a message to Dr. Sin Patel at the [facility].  I could also speak with the surgeons at [facility] in [place].  I will contact the patient with the results of the beta-2 testing when available.    Phillip to follow up with Primary Care provider regarding elevated blood pressure.    Man Sahni MD  Department of Otolaryngology-Head and Neck Surgery  [facility]

## 2022-05-11 ENCOUNTER — OFFICE VISIT (OUTPATIENT)
Dept: OTOLARYNGOLOGY | Facility: CLINIC | Age: 67
End: 2022-05-11
Payer: MEDICARE

## 2022-05-11 VITALS
SYSTOLIC BLOOD PRESSURE: 155 MMHG | WEIGHT: 275 LBS | TEMPERATURE: 99.1 F | BODY MASS INDEX: 35.29 KG/M2 | HEIGHT: 74 IN | DIASTOLIC BLOOD PRESSURE: 118 MMHG | HEART RATE: 97 BPM

## 2022-05-11 DIAGNOSIS — Z98.890 HISTORY OF ENDOSCOPIC SINUS SURGERY: ICD-10-CM

## 2022-05-11 DIAGNOSIS — G96.01 CSF RHINORRHEA: Primary | ICD-10-CM

## 2022-05-11 DIAGNOSIS — Z71.6 ENCOUNTER FOR TOBACCO USE CESSATION COUNSELING: ICD-10-CM

## 2022-05-11 DIAGNOSIS — J01.90 ACUTE SINUSITIS WITH SYMPTOMS > 10 DAYS: ICD-10-CM

## 2022-05-11 DIAGNOSIS — F17.200 TOBACCO DEPENDENCE SYNDROME: ICD-10-CM

## 2022-05-11 PROCEDURE — 31231 NASAL ENDOSCOPY DX: CPT | Performed by: OTOLARYNGOLOGY

## 2022-05-11 PROCEDURE — 86334 IMMUNOFIX E-PHORESIS SERUM: CPT

## 2022-05-11 PROCEDURE — 99204 OFFICE O/P NEW MOD 45 MIN: CPT | Mod: 25 | Performed by: OTOLARYNGOLOGY

## 2022-05-11 RX ORDER — SULFAMETHOXAZOLE/TRIMETHOPRIM 800-160 MG
1 TABLET ORAL 2 TIMES DAILY
Qty: 42 TABLET | Refills: 0 | Status: SHIPPED | OUTPATIENT
Start: 2022-05-11 | End: 2022-06-01

## 2022-05-11 RX ORDER — PREDNISONE 10 MG/1
30 TABLET ORAL DAILY
Qty: 15 TABLET | Refills: 0 | Status: SHIPPED | OUTPATIENT
Start: 2022-05-11 | End: 2022-05-16

## 2022-05-11 NOTE — NURSING NOTE
"Initial BP (!) 155/118 (BP Location: Right arm, Patient Position: Sitting, Cuff Size: Adult Large)   Pulse 97   Temp 99.1  F (37.3  C) (Tympanic)   Ht 1.88 m (6' 2\")   Wt 124.7 kg (275 lb)   BMI 35.31 kg/m   Estimated body mass index is 35.31 kg/m  as calculated from the following:    Height as of this encounter: 1.88 m (6' 2\").    Weight as of this encounter: 124.7 kg (275 lb). .    Omayra Crespo CMA    "

## 2022-05-11 NOTE — LETTER
5/11/2022         RE: Jayme Selby  [address] [facility]  [place] [address]        Dear Colleague,    Thank you for referring your patient, Jayme Selby, to the [facility]. Please see a copy of my visit note below.    Chief Complaint   Patient presents with     Ent Problem     Check nose/constant runny nose - yellow drainage/ Left head pain/hx of sinus/polyp surgery- Dr. Sandoval/also hx left pet 2014? Dr. He  Has been on 2 antibiotics and Doxycycline did help some      History of Present Illness   Jayme Selby is a 67 year old male who presents for nose and sinus evaluation. I am seeing this patient in consultation for sinus drainage at the request of the provider Dr. Dean. The patient has a fairly complex past ear and sinus history.  The patient has a history of recurrent acute sinusitis and underwent imaging for surgical planning on 3/4/2014.  My review of the CT scan shows significant paranasal sinus disease involving the maxillary and ethmoid sinuses.  Near the anterior cribriform plate on the left, there was a concerning area for dehiscence and a very rounded appearing lesion of the anterior skull base that is difficult to see because of the paranasal sinus opacification.  The patient subsequently underwent endoscopic sinus surgery.  My review of the medical record shows the surgery to include maxillary antrostomies, anterior ethmoidectomies, and turbinate reduction.    Patient then had sudden sensorineural hearing change with some conductive component.  He underwent MRI imaging of the brain on 12/5/2014.  He was noted to have middle ear/mastoid effusion on the left.  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    The patient subsequently saw Dr. He and he had recommended a middle ear exploration with a type of round window surgery and ear tube placement for the middle ear effusion.  Prior to  surgery, he patient underwent a repeat maxillofacial CT without contrast on 7/2/2015.  My review of the CT showed a mild rightward nasal septal deviation.  The patient had bilateral middle turbinate edda bullosa, the right was opacified at the time of the scan.  Patient did have bilateral ostiomeatal unit obstruction.  Both maxillary sinuses were unopacified.  There were scattered thickening throughout the ethmoid sinuses.  On the left-hand side, a bit better seen is the area of possible skull base dehiscence with a very round lesion from the skull base which could be a opacified ethmoid air cell versus a myelomeningocele.     The patient states that his ear tube eventually fell out and his ear was doing well.  After his sinus surgery, he had improvement in his recurrent sinus infections.  About a year and a half ago, he started having intermittent clear drainage from his nose that seems to be more prevalent when he bends over, bears down, blows his nose, or sneezes.  He is also been having some left-sided frontal temporal headaches that usually is worse when his nasal drainage is worse.  He denies any nasal obstruction, postnasal drainage, taste or smell changes.  He has used some topical nasal steroid sprays in his nose in the past.  The patient is a daily smoker, roughly 1/2 pack/day.  He reports a history of multiple nasal traumas as a child.  Recently on 2 courses of antibiotics, most recently doxycycline at the beginning of April.  He feels like he is developing another sinus infection.    Past Medical History  Patient Active Problem List   Diagnosis     Tobacco use disorder     Allergic rhinitis due to pollen     Hyperlipidemia LDL goal <130     Advanced directives, counseling/discussion     Sinusitis, chronic     Allergic rhinitis due to animal dander     Seasonal allergic rhinitis     Diagnostic skin and sensitization tests (aka ALLERGENS)     Mild intermittent asthma without complication     Essential  hypertension with goal blood pressure less than 140/90     Sigmoid diverticulitis     Obesity (BMI 35.0-39.9) with comorbidity (H)     Current Medications     Current Outpatient Medications:      albuterol (PROAIR HFA) 108 (90 Base) MCG/ACT inhaler, INHALE 2 PUFFS INTO THE LUNGS EVERY 4 HOURS AS NEEDED, Disp: 6.7 g, Rfl: 11     ascorbic acid (VITAMIN C) 250 MG CHEW, Take 500 mg by mouth daily, Disp: , Rfl:      beclomethasone HFA (QVAR REDIHALER) 80 MCG/ACT inhaler, Inhale 1 puff into the lungs 2 times daily During allergy seasons, Disp: 10.6 g, Rfl: 11     calcium carbonate (TUMS) 500 MG chewable tablet, Take 1 chew tab by mouth as needed for heartburn , Disp: , Rfl:      cetirizine (ZYRTEC) 10 MG tablet, Take 10 mg by mouth daily, Disp: , Rfl:      DM-Doxylamine-Acetaminophen (NYQUIL COLD & FLU PO), Take by mouth as needed Reported on 3/14/2017, Disp: , Rfl:      fluticasone (FLONASE) 50 MCG/ACT nasal spray, USE 2 SPRAYS IN EACH NOSTRIL EVERY DAY AS NEEDED, Disp: 16 g, Rfl: 10     ipratropium (ATROVENT) 0.03 % nasal spray, Spray 2 sprays into both nostrils 2 times daily, Disp: 30 mL, Rfl: 11     multivitamin w/minerals (THERA-VIT-M) tablet, Take 1 tablet by mouth daily, Disp: , Rfl:      predniSONE (DELTASONE) 10 MG tablet, Take 3 tablets (30 mg) by mouth daily for 5 days, Disp: 15 tablet, Rfl: 0     sulfamethoxazole-trimethoprim (BACTRIM DS) 800-160 MG tablet, Take 1 tablet by mouth 2 times daily for 21 days, Disp: 42 tablet, Rfl: 0     Zinc Sulfate (ZINC 15 PO), , Disp: , Rfl:     Allergies  Allergies   Allergen Reactions     Advair Diskus Other (See Comments)     Adverse reaction per previous medical records.  Vision changes     Amlodipine Cough     Flu Like Symptoms       Lisinopril Cough     Per patient.     Losartan Cough     Flu like symptoms       Chlorthalidone Other (See Comments) and Rash     Lesions on Face, Chest Pressure         Social History   Social History     Socioeconomic History     Marital  "status:    Tobacco Use     Smoking status: Current Every Day Smoker     Packs/day: 0.50     Years: 40.00     Pack years: 20.00     Types: Cigarettes     Smokeless tobacco: Never Used     Tobacco comment: smokes natural cigerettes not in box   Vaping Use     Vaping Use: Never used   Substance and Sexual Activity     Alcohol use: Not Currently     Drug use: No     Sexual activity: Never   Other Topics Concern     Parent/sibling w/ CABG, MI or angioplasty before 65F 55M? Yes   Social History Narrative    March 10, 2017        ENVIRONMENTAL HISTORY: [place] is heated with a forced air and gas furnace. They does have central air conditioning. The patient's bedroom is furnished with carpeting in bedroom, allergen mattress cover, allergen pillowcase cover and fabric window coverings. No pets inside the house. There is not history of cockroach or mice infestation. There are 2 smokers in the house.  The house does not have a basement.        Family History  Family History   Problem Relation Age of Onset     Diabetes Mother      LUNG DISEASE Mother      Hypertension Father      C.A.D. Father      Hyperlipidemia Father      Seasonal/Environmental Allergies Sister        Review of Systems  As per HPI and PMHx, otherwise 10+ comprehensive system review is negative.    Physical Exam  BP (!) 155/118 (BP Location: Right arm, Patient Position: Sitting, Cuff Size: Adult Large)   Pulse 97   Temp 99.1  F (37.3  C) (Tympanic)   Ht 1.88 m (6' 2\")   Wt 124.7 kg (275 lb)   BMI 35.31 kg/m    GENERAL: Patient is a pleasant, cooperative 67 year old male in no acute distress.  HEAD: Normocephalic, atraumatic.  Hair and scalp are normal.  EYES: Pupils are equal, round, reactive to light and accommodation.  Extraocular movements are intact.  The sclera nonicteric without injection.  The extraocular structures are normal.  EARS: Normal shape and symmetry.  No tenderness when " palpating the mastoid or tragal areas bilaterally.  Otoscopic exam reveals a minimal amount of cerumen bilaterally.  The bilateral tympanic membranes are round, intact without evidence of effusion, good landmarks.  No retraction, granulation, or drainage.  NOSE: Nares are patent.  Nasal mucosa is pink and moist.  The nasal septum deviates slightly to the right.  The inferior turbinates appear normal.  No nasal cavity masses, polyps, or mucopurulence on anterior rhinoscopy.  I did have the patient perform a Dandy maneuver with slight Valsalva and he had copious clear rhinorrhea come from the left nasal cavity.  I collected the drainage for beta-2 testing.  NEUROLOGIC: Cranial nerves II through XII are grossly intact.  Voice is strong.  Patient is House-Brackmann I/VI bilaterally.  CARDIOVASCULAR: Extremities are warm and well-perfused.  No significant peripheral edema.  RESPIRATORY: Patient has nonlabored breathing without cough, wheeze, stridor.  PSYCHIATRIC: Patient is alert and oriented.  Mood and affect appear normal.  SKIN: Warm and dry.  No scalp, face, or neck lesions noted.    Procedure: Flexible Nasal Endoscopy  Indication: Persistent left-sided rhinorrhea concerning for CSF rhinorrhea    To best visualize the sinonasal anatomy and due to the chief complaint and HPI, I proceeded with flexible fiberoptic nasal endoscopy.  The bilateral nasal cavities were anesthetized and decongested with a mixture of lidocaine and neosynephrine.  The bilateral nasal cavities were then examined using flexible fiberoptic nasal endoscope.  The right nasal cavity was without masses, polyps, or mucopurulence.  The right middle turbinate and middle meatus was normal in appearance.  The sphenoethmoid recess was clear.  The left nasal cavity was without masses, polyps, or mucopurulence.  In the left middle meatus between the middle turbinate and the lateral nasal wall superiorly towards the sphenoethmoid recess, there was a grayish  appearing lesion that was difficult to visualize given the narrow aperture between the middle turbinate and lateral nasal wall.  I had the patient Valsalva and there appeared to be some welling of fluid.  This is in the anterior ethmoid area consistent with the CT imaging previously.  he sphenoethmoid recess was clear.  The sinonasal mucosa appeared normal.  The nasal septum is essentially midline.  The nasopharynx had a normal appearance with normal Eustachian tube openings and fossa of Rosenmuller bilaterally.  Minimal adenoid tissue.  The scope was removed.  The patient tolerated the procedure well.    Assessment and Plan     ICD-10-CM    1. CSF rhinorrhea  G96.01 NASAL ENDOSCOPY, DIAGNOSTIC     Beta 2 transferrin     sulfamethoxazole-trimethoprim (BACTRIM DS) 800-160 MG tablet     predniSONE (DELTASONE) 10 MG tablet     Beta 2 transferrin   2. History of endoscopic sinus surgery  Z98.890 NASAL ENDOSCOPY, DIAGNOSTIC     Beta 2 transferrin     sulfamethoxazole-trimethoprim (BACTRIM DS) 800-160 MG tablet     predniSONE (DELTASONE) 10 MG tablet     Beta 2 transferrin   3. Acute sinusitis with symptoms > 10 days  J01.90 NASAL ENDOSCOPY, DIAGNOSTIC     Beta 2 transferrin     sulfamethoxazole-trimethoprim (BACTRIM DS) 800-160 MG tablet     predniSONE (DELTASONE) 10 MG tablet     Beta 2 transferrin   4. BMI 35.0-35.9,adult  Z68.35    5. Tobacco dependence syndrome  F17.200    6. Encounter for tobacco use cessation counseling  Z71.6      It was my pleasure seeing Jayme Selby today in clinic.  The patient presents with a roughly 18-month history of intermittent clear rhinorrhea from the left nasal cavity concerning for CSF rhinorrhea.  In review of his previous imaging, it looks like he might of had a meningocele or myelo meningocele present even prior to his initial surgery.  The extent of his surgery was quite limited from his pre and postoperative imaging.  There is also suggestion of possible myelomeningocele on his  MRI imaging that was performed post surgery.  The patient is having current symptoms concerning for infection.  He had no obvious purulence on examination but obviously is at risk from infection if this is CSF rhinorrhea.  We discussed a 21-day course of Bactrim.  I will give him a burst of prednisone.  I will send his nasal drainage for beta-2 testing.  If beta-2 positive, we need to have him meet with a skull base surgeon to discuss repair.  I will send a message to Dr. Sin Patel at the [facility].  I could also speak with the surgeons at [facility] in [place].  I will contact the patient with the results of the beta-2 testing when available.    Phillip to follow up with Primary Care provider regarding elevated blood pressure.    Man Sahni MD  Department of Otolaryngology-Head and Neck Surgery  [facility]         Again, thank you for allowing me to participate in the care of your patient.        Sincerely,        Man Sahni MD

## 2022-05-11 NOTE — PATIENT INSTRUCTIONS
Per physician instructions      If you have questions or concerns on any instructions given to you by your provider today or if you need to schedule an appointment, you can reach us at [phone].

## 2022-05-12 ENCOUNTER — NURSE TRIAGE (OUTPATIENT)
Dept: NURSING | Facility: CLINIC | Age: 67
End: 2022-05-12

## 2022-05-12 LAB
B2 TRANSFERRIN FLD QL: POSITIVE
B2 TRANSFERRIN INTERPRETATION: ABNORMAL

## 2022-05-12 NOTE — TELEPHONE ENCOUNTER
Pt advised and will reach out to us if he has not been contacted by late afternoon tomorrow.    Valerie PHILIP   [facility]

## 2022-05-12 NOTE — TELEPHONE ENCOUNTER
Nurse Triage SBAR    Is this a 2nd Level Triage? Yes    Situation    Critical Labs reported  Beta @ [facility]:    Positive result    Called the clinic and sent to the provider.   Refer to clinic for review     Ilana Bernstein RN  Central Triage Red Flags/Med Refills

## 2022-05-12 NOTE — TELEPHONE ENCOUNTER
I spoke with Sin Patel at the [facility].  They want to see him next week in clinic for evaluation, I think they said next Thursday or Friday.  Their team was going to reach out to the patient so he could meet with the skull base surgeon to fix his CSF leak.     Can you please contact the patient and let him know the results?     IJL

## 2022-05-12 NOTE — TELEPHONE ENCOUNTER
Man Sahni MD   P Fl Wy Sp Rn Pool  Caller: Unspecified (Today, 12:24 PM)  Noted.     RNs, the patient knows that this was the likely result.  I spoke with Sin Patel at the [facility].  They want to see him next week in clinic for evaluation, I think they said next Thursday or Friday.  Their team was going to reach out to the patient so he could meet with the skull base surgeon to fix his CSF leak.     Can you please contact the patient and let him know the results?     IJL

## 2022-05-13 NOTE — TELEPHONE ENCOUNTER
FUTURE VISIT INFORMATION      FUTURE VISIT INFORMATION:    Date: 5/20/22    Time: 1PM    [place] [facility] [place]  REFERRAL INFORMATION:    Referring provider:  Dr Man Sahni    Referring providers clinic:  [facility]     Reason for visit/diagnosis  CSF leak, ref by Bora and danni per Dr. Patel    RECORDS REQUESTED FROM:       Clinic name Comments Records Status Imaging Status   [facility]  5/11/22 note from Dr Man Sahni [facility] Internal Med  3/30/22 note from Dr Todd Dean [facility] ED 3/11/22 ED note from Dr Haider Cruz  7/16/21 ED note from Micaela Demarco APRN CNP   [facility]    Surgeries  1/29/15 Left Transcanal Exploratory Tympanotomy, Round Window Graft, Myringotomy     6/4/14 Bilateral Submucousal Reduction of Inferior Turbinates,Total Ethmoidectomy with Multiple Sinusotomies    5/7/14 Septoplasty,Bilateral Submucousal Reduction of Inferior Turbinates,Total Ethmoidectomy with Multiple Sinusotomies [facility]    Imaging 7/2/15 CT Maxillofacial   12/5/14 MR Brain  [facility] PACS

## 2022-05-19 NOTE — PATIENT INSTRUCTIONS
"You were seen in the clinic today by Dr. Patel. If you have any questions or concerns after your appointment, please call the clinic at [phone]. Press \"1\" for scheduling, press \"3\" for nurse advice.    2.   The following has been recommended for you based upon your appointment today:   -Orders have been placed for an MRI and a CT for you.    3.   Plan to return the clinic after surgery.       Peace Abreu RNCC  [facility]  Department of Otolaryngology  [phone]       Surgery Teaching    1. Someone from our scheduling department will call you within the next few days to get you scheduled with your provider for surgery. If no one has called you in one week, please notify us.    2. You must have a physical exam (called  history and physical ) within 30 days of surgery. You may complete this with your primary care provider.   A. If your provider is outside of the [facility] please have them complete the preoperative forms provided to you in the surgery packet you will be mailed and be sure to have your provider fax them to the appropriate location prior to surgery. For surgery at the [facility] the fax number is:[phone]. For surgery at the [facility] the fax number is [phone].  B. In some cases we may have you see our Preoperative Assessment Center. If we have expressed this to you, our  will set up your appointment with them when they call to set up your surgery.    3. Complete a COVID test 4 days prior to surgery. You will need to have this done regardless of whether you have had the COVID vaccine. If you have the test performed at a clinic outside of the network, you will need to have the test results faxed to us.    4. For same-day surgery, you must arrange for an adult to take you home from the Center. An adult must stay with you for the first 24 hours after surgery. You cannot drive for 24 hours.     5. Ask your doctor what medicines are safe before surgery. For over the counter " medications and supplements it is advised that you do NOT TAKE MOTRIN, IBUPROFEN, ASPIRIN, ALEVE, GARLIC SUPPLEMENTS or FISH OIL x 7 days prior to surgery (to prevent excess bleeding and bruising at time of surgery). If your provider advises you to take any medication the morning of surgery you should take this with a sip of water.    6. A few days prior to surgery a nurse will call you to review your health history and instructions for before and after surgery. They will give you your final arrival time based upon your scheduled arrival time for surgery.    7. Call the surgical team if there's any change in your health prior to surgery. Things you should call for include but are not limited to signs of a cold or the flu (sore throat, runny nose, cough, rash, fever). Other things to notify them for is for any open wounds (cuts, scrapes, scratches) near to the surgery site.    8. If you drink alcohol, stop drinking alcohol at least 24 hours before surgery.    9. If you smoke, stop or at least cut down on smoking 24 hours before surgery.    10.Take a bath or shower the night before and the morning of surgery (as told by your surgeon). Use an antiseptic soap. If your doctor does not give you special soap, buy Hibiclens or Merline-Stat at the drug store or ask the pharmacist to suggest a brand. You will wash with this from the neck down, washing your hair and face as you would normally.   A. When you are done with your shower please be sure to use clean towels to dry with, have clean linens on your bed, and put on clean clothes each time.   B. DO NOT put on lotion, powder, perfume, deodorant or make-up after bathing.    11. You can eat a normal meal the night before surgery. Do not eat any solid foods or drink any milk products for 8 hours before surgery.     12. You may drink clear liquids until 2 hours before surgery. Clear liquids include water, Gatorade, apple juice and liquids you can see through.    13. No eating or  drinking 2 hours prior to surgery until after surgery. Your post op team will review any diet limitations you might have and when you can start eating and drinking again after surgery.      If you have any questions before or after surgery please call:    YANNICK Ho  [facility]  Department of Otolaryngology  [phone]

## 2022-05-20 ENCOUNTER — OFFICE VISIT (OUTPATIENT)
Dept: OTOLARYNGOLOGY | Facility: CLINIC | Age: 67
End: 2022-05-20
Payer: COMMERCIAL

## 2022-05-20 ENCOUNTER — PRE VISIT (OUTPATIENT)
Dept: OTOLARYNGOLOGY | Facility: CLINIC | Age: 67
End: 2022-05-20
Payer: MEDICARE

## 2022-05-20 VITALS
OXYGEN SATURATION: 94 % | HEIGHT: 74 IN | HEART RATE: 91 BPM | SYSTOLIC BLOOD PRESSURE: 166 MMHG | WEIGHT: 275 LBS | BODY MASS INDEX: 35.29 KG/M2 | DIASTOLIC BLOOD PRESSURE: 104 MMHG | TEMPERATURE: 98.7 F

## 2022-05-20 DIAGNOSIS — G96.01 CSF RHINORRHEA: Primary | ICD-10-CM

## 2022-05-20 DIAGNOSIS — Q01.9 ENCEPHALOCELE (H): ICD-10-CM

## 2022-05-20 DIAGNOSIS — E66.01 MORBID OBESITY (H): ICD-10-CM

## 2022-05-20 DIAGNOSIS — J32.4 CHRONIC PANSINUSITIS: ICD-10-CM

## 2022-05-20 PROCEDURE — 31231 NASAL ENDOSCOPY DX: CPT | Performed by: OTOLARYNGOLOGY

## 2022-05-20 PROCEDURE — 99214 OFFICE O/P EST MOD 30 MIN: CPT | Mod: 25 | Performed by: OTOLARYNGOLOGY

## 2022-05-20 ASSESSMENT — PAIN SCALES - GENERAL: PAINLEVEL: NO PAIN (0)

## 2022-05-20 NOTE — PROGRESS NOTES
[facility]  New Patient Visit      Encounter date:   May 20, 2022    Referring Provider:   Dr. Man Sahni  [facility]NT    Chief Complaint: CSF leak    History of Present Illness:   Jayme Selby is a 67 year old male, with history of multiple nasal traumas who presents for consultation regarding CSF leak. The patient has been following with Dr. Kai Sandoval for many years, and his original sinus surgery in May of 2014 was cancelled. Dr. Sandoval did perform surgery for her in 2014 as noted below. The patient was lost in follow up after these surgeries until last year when he presented to the ED with cough, congestion, and chest tightness. His history of seasonal allergies were exacerbated by pollen and he was safely discharged home with a prednisone course. He then returned to the ED 2 months ago (03/11/2022) for recurrent sinusitis concern, and he was started on amoxicillin. The patient was referred to Dr. Sahni for consultation. The patient consulted with Dr. Sahni last week (05/11/2022) and he reported clear rhinorrhea from his left nasal cavity. Imaging done prior to the visit displayed concern for possible skull base dehiscence and CSF leak. Dr. Sahni sent a sample of his rhinorrhea for beta-2 transferrin testing, and referred the patient to my team for fruther evaluation of his case when   the patient was referred to my team for further evaluation.    Today, the patient reports when he clenches his teeth, he feels fluid behind his left ear. He feels that his CSF leak is the cause of the fluid behind his left ear drum that he had surgery on in 2015 by Dr. He. Phillip reports that his sense of smell is intact, and actually improved after his surgery in 2014 by Dr. Sandoval.    Phillip describes that the fluid has a salty taste to it and this wakes him up from sleep frequently.    [facility]  PROCEDURE DATE: 06/04/2014    SURGEON:  Kai Sandoval MD       PREOPERATIVE DIAGNOSIS:  Chronic sinusitis.      POSTOPERATIVE DIAGNOSIS:  Chronic sinusitis.      PROCEDURES PERFORMED:   1.         Bilateral anterior ethmoidectomies.   2.         Bilateral maxillary antrostomies.   3.         Bilateral frontal sinusotomies.   4.         Bilateral submucosal reduction of inferior turbinates.     Review of systems: A 14-point review of systems has been conducted and was negative for any notable symptoms, except as dictated in the history of present illness.     Medical History:  Past Medical History:   Diagnosis Date     Allergic rhinitis due to animal dander     12/5/14 IgE tests pos. only for cat/dog/T (all other environmental allergens NEGATIVE)     Diagnostic skin and sensitization tests (aka ALLERGENS) 12/5/14 IgE tests pos. only for cat/dog/T (all other environmental allergens NEGATIVE)     GERD (gastroesophageal reflux disease)      H. pylori infection 1999     Seasonal allergic rhinitis       Surgical History:   Past Surgical History:   Procedure Laterality Date     CHOLECYSTECTOMY  09/1999     ETHMOIDECTOMY  5/7/2014    Procedure: ETHMOIDECTOMY;  Surgeon: Kai Sandoval MD;  Location: [facility]     ETHMOIDECTOMY  6/4/2014    Procedure: ETHMOIDECTOMY;  Surgeon: Kai Sandoval MD;  Location: [facility]     TONSILLECTOMY       TYMPANOTOMY EXPLORATORY Left 1/29/2015    Procedure: TYMPANOTOMY EXPLORATORY;  Surgeon: Michel He MD;  Location:  OR      Family History:  Family History   Problem Relation Age of Onset     Diabetes Mother      LUNG DISEASE Mother      Hypertension Father      C.A.D. Father      Hyperlipidemia Father      Seasonal/Environmental Allergies Sister       Social History:   Social History     Socioeconomic History     Marital status:    Tobacco Use     Smoking status: Current Every Day Smoker     Packs/day: 0.50     Years: 40.00     Pack years: 20.00     Types: Cigarettes     Smokeless tobacco: Never Used     Tobacco comment:  "smokes natural cigerettes not in box   Vaping Use     Vaping Use: Never used   Substance and Sexual Activity     Alcohol use: Not Currently     Drug use: No     Sexual activity: Never   Other Topics Concern     Parent/sibling w/ CABG, MI or angioplasty before 65F 55M? Yes   Social History Narrative    March 10, 2017        ENVIRONMENTAL HISTORY: [place] home is heated with a forced air and gas furnace. They does have central air conditioning. The patient's bedroom is furnished with carpeting in bedroom, allergen mattress cover, allergen pillowcase cover and fabric window coverings. No pets inside the house. There is not history of cockroach or mice infestation. There are 2 smokers in the house.  The house does not have a basement.       Physical Exam:  Vital signs: BP (!) 166/104 (BP Location: Left arm, Patient Position: Sitting, Cuff Size: Adult Large)   Pulse 91   Temp 98.7  F (37.1  C) (Temporal)   Ht 1.88 m (6' 2\")   Wt 124.7 kg (275 lb)   SpO2 94%   BMI 35.31 kg/m     General Appearance: No acute distress, appropriate demeanor, conversant  Eyes: moist conjunctivae; EOMI; pupils symmetric; visual acuity grossly intact; no proptosis  Head: normocephalic; overall symmetric appearance without deformity  Face: overall symmetric without deformity; HB I/VI  Ears: Normal appearance of external ear; external meatus normal in appearance; TMs intact without perforation bilaterally   Nose: No external deformity; see endoscopy exam below   Oral Cavity/oropharynx: Normal appearance of mucosa; tongue midline; no mass or lesions; oropharynx without obvious mucosal abnormality  Neck: no palpable lymphadenopathy; thyroid without palpable nodules  Lungs: symmetric chest rise; no wheezing  CV: Good distal perfusion; normal hear rate  Extremities: No deformity  Neurologic Exam: Cranial nerves II-XII are grossly intact; no focal deficit      Procedure Note  Procedure performed: " Rigid nasal endoscopy  Indication: To evaluate for sinonasal pathology not visualized on routine anterior rhinoscopy  Anesthesia: 4% topical lidocaine with 0.05% oxymetazoline  Description of procedure: A 30 degree, 3 mm rigid endoscope was inserted into bilateral nasal cavities and the nasal valves, nasal cavity, middle meatus, sphenoethmoid recess, and nasopharynx were thoroughly evaluated for evidence of obstruction, edema, purulence, polyps and/or mass/lesion.     [place] Endoscopic Scoring System  Endoscopic observation Right Left   Polyps in middle meatus (0 = absent, 1 = restricted to middle meatus, 2 = Beyond middle meatus) 0 0   Discharge (0 = absent, 1 = thin and clear, 2 = thick, purulent) 0 0   Edema (0 = absent, 1 = mild-moderate, 2 = moderate-severe) 0 0   Crusting (0 = absent, 1 = mild-moderate, 2 = moderate-severe) 0 0   Scarring (0= absent, 1 = mild-moderate, 2 = moderate-severe) 0 0   Total 0 0     Findings  RT: rightward septal deviation, limiting evaluation of the MM and SER clear  LT: subtle clear fluid present in the MM that is draining in the back to the nasopharynx    The patient tolerated the procedure well without complication.     Laboratory Review:  Beta 2 Transferrin (05/11/2022): Positive    *Laboratory results were independently reviewed*    Imaging Review:  CT Maxillofacial WO Contrast (07/02/2015):  IMPRESSION:   Improvement in mucosal thickening in the paranasal sinuses and nasal cavity bilaterally since the previous exam. 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    MR Brain and the Skullbase W/O & W Contrast (12/15/2014):  IMPRESSION:  1. Possible acute left mastoiditis. Clinical correlation recommended.  2. Otherwise, normal brain MRI.  3. Normal MRI of the skull base and internal auditory canals  bilaterally.        I have reviewed both CT and MRIs.  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      Pathology  Review:  n/a    Assessment/Medical Decision Making:  CSF rhinorrhea  Chronic sinusitis as noted above    We discussed his imaging and beta 2 transferrin test confirm he has a CSF leak. We discussed the surgery, postoperative course, risks, and benefits.  We discussed the etiology of CSF leaks at length.  He is amenable to surgery, and we will proceed with the scheduling process.    I recommended getting updated CT and MRI scans prior to surgery, and he can do these closer to home.    Plan:  We will schedule EEA for repair of left ethmoid roof skull base defect and removal of encephalocele  Needs updated CT and MRI prior to surgery  Consult with Dr. Pride    Return to clinic postop.    Sin Patel MD    [facility]  Rhinology  Endoscopic Skull Base Surgery  [facility]  Department of Otolaryngology - Head & Neck Surgery    Scribe Disclosure:  I, Lyla Pena, am serving as a scribe to document services personally performed by Sin Patel MD at this visit, based upon the provider's statements to me. All documentation has been reviewed by the aforementioned provider prior to being entered into the official medical record.

## 2022-05-20 NOTE — LETTER
5/20/2022       RE: Jayme Selby  [id number]  [place] [address]     Dear Colleague,    Thank you for referring your patient, Jayme Selby, to the [facility] [place] at [facility]. Please see a copy of my visit note below.      [facility]  New Patient Visit      Encounter date:   May 20, 2022    Referring Provider:   Dr. Man Sahni  [facility]    Chief Complaint: CSF leak    History of Present Illness:   Jayme Selby is a 67 year old male, with history of multiple nasal traumas who presents for consultation regarding CSF leak. The patient has been following with Dr. Kai Sandoval for many years, and his original sinus surgery in May of 2014 was cancelled. Dr. Sandoval did perform surgery for her in 2014 as noted below. The patient was lost in follow up after these surgeries until last year when he presented to the ED with cough, congestion, and chest tightness. His history of seasonal allergies were exacerbated by pollen and he was safely discharged home with a prednisone course. He then returned to the ED 2 months ago (03/11/2022) for recurrent sinusitis concern, and he was started on amoxicillin. The patient was referred to Dr. Sahni for consultation. The patient consulted with Dr. Sahni last week (05/11/2022) and he reported clear rhinorrhea from his left nasal cavity. Imaging done prior to the visit displayed concern for possible skull base dehiscence and CSF leak. Dr. Sahni sent a sample of his rhinorrhea for beta-2 transferrin testing, and referred the patient to my team for fruther evaluation of his case when   the patient was referred to my team for further evaluation.    Today, the patient reports when he clenches his teeth, he feels fluid behind his left ear. He feels that his CSF leak is the cause of the fluid behind his left ear drum that he had surgery on in 2015 by Dr. He. Phillip  reports that his sense of smell is intact, and actually improved after his surgery in 2014 by Dr. Sandoval.    Phillip describes that the fluid has a salty taste to it and this wakes him up from sleep frequently.    [facility]:  PROCEDURE DATE: 06/04/2014    SURGEON:  Kai Sandoval MD      PREOPERATIVE DIAGNOSIS:  Chronic sinusitis.      POSTOPERATIVE DIAGNOSIS:  Chronic sinusitis.      PROCEDURES PERFORMED:   1.         Bilateral anterior ethmoidectomies.   2.         Bilateral maxillary antrostomies.   3.         Bilateral frontal sinusotomies.   4.         Bilateral submucosal reduction of inferior turbinates.     Review of systems: A 14-point review of systems has been conducted and was negative for any notable symptoms, except as dictated in the history of present illness.     Medical History:  Past Medical History:   Diagnosis Date     Allergic rhinitis due to animal dander     12/5/14 IgE tests pos. only for cat/dog/T (all other environmental allergens NEGATIVE)     Diagnostic skin and sensitization tests (aka ALLERGENS) 12/5/14 IgE tests pos. only for cat/dog/T (all other environmental allergens NEGATIVE)     GERD (gastroesophageal reflux disease)      H. pylori infection 1999     Seasonal allergic rhinitis       Surgical History:   Past Surgical History:   Procedure Laterality Date     CHOLECYSTECTOMY  09/1999     ETHMOIDECTOMY  5/7/2014    Procedure: ETHMOIDECTOMY;  Surgeon: Kai Sandoval MD;  Location: [facility]     ETHMOIDECTOMY  6/4/2014    Procedure: ETHMOIDECTOMY;  Surgeon: Kai Sandoval MD;  Location: [facility]     TONSILLECTOMY       TYMPANOTOMY EXPLORATORY Left 1/29/2015    Procedure: TYMPANOTOMY EXPLORATORY;  Surgeon: Michel He MD;  Location:  OR      Family History:  Family History   Problem Relation Age of Onset     Diabetes Mother      LUNG DISEASE Mother      Hypertension Father      C.A.D. Father      Hyperlipidemia Father      Seasonal/Environmental  "Allergies Sister       Social History:   Social History     Socioeconomic History     Marital status:    Tobacco Use     Smoking status: Current Every Day Smoker     Packs/day: 0.50     Years: 40.00     Pack years: 20.00     Types: Cigarettes     Smokeless tobacco: Never Used     Tobacco comment: smokes natural cigerettes not in box   Vaping Use     Vaping Use: Never used   Substance and Sexual Activity     Alcohol use: Not Currently     Drug use: No     Sexual activity: Never   Other Topics Concern     Parent/sibling w/ CABG, MI or angioplasty before 65F 55M? Yes   Social History Narrative    March 10, 2017        ENVIRONMENTAL HISTORY: [place] The home is heated with a forced air and gas furnace. They does have central air conditioning. The patient's bedroom is furnished with carpeting in bedroom, allergen mattress cover, allergen pillowcase cover and fabric window coverings. No pets inside the house. There is not history of cockroach or mice infestation. There are 2 smokers in the house.  The house does not have a basement.       Physical Exam:  Vital signs: BP (!) 166/104 (BP Location: Left arm, Patient Position: Sitting, Cuff Size: Adult Large)   Pulse 91   Temp 98.7  F (37.1  C) (Temporal)   Ht 1.88 m (6' 2\")   Wt 124.7 kg (275 lb)   SpO2 94%   BMI 35.31 kg/m     General Appearance: No acute distress, appropriate demeanor, conversant  Eyes: moist conjunctivae; EOMI; pupils symmetric; visual acuity grossly intact; no proptosis  Head: normocephalic; overall symmetric appearance without deformity  Face: overall symmetric without deformity; HB I/VI  Ears: Normal appearance of external ear; external meatus normal in appearance; TMs intact without perforation bilaterally   Nose: No external deformity; see endoscopy exam below   Oral Cavity/oropharynx: Normal appearance of mucosa; tongue midline; no mass or lesions; oropharynx without obvious mucosal " abnormality  Neck: no palpable lymphadenopathy; thyroid without palpable nodules  Lungs: symmetric chest rise; no wheezing  CV: Good distal perfusion; normal hear rate  Extremities: No deformity  Neurologic Exam: Cranial nerves II-XII are grossly intact; no focal deficit      Procedure Note  Procedure performed: Rigid nasal endoscopy  Indication: To evaluate for sinonasal pathology not visualized on routine anterior rhinoscopy  Anesthesia: 4% topical lidocaine with 0.05% oxymetazoline  Description of procedure: A 30 degree, 3 mm rigid endoscope was inserted into bilateral nasal cavities and the nasal valves, nasal cavity, middle meatus, sphenoethmoid recess, and nasopharynx were thoroughly evaluated for evidence of obstruction, edema, purulence, polyps and/or mass/lesion.     [place] Endoscopic Scoring System  Endoscopic observation Right Left   Polyps in middle meatus (0 = absent, 1 = restricted to middle meatus, 2 = Beyond middle meatus) 0 0   Discharge (0 = absent, 1 = thin and clear, 2 = thick, purulent) 0 0   Edema (0 = absent, 1 = mild-moderate, 2 = moderate-severe) 0 0   Crusting (0 = absent, 1 = mild-moderate, 2 = moderate-severe) 0 0   Scarring (0= absent, 1 = mild-moderate, 2 = moderate-severe) 0 0   Total 0 0     Findings  RT: rightward septal deviation, limiting evaluation of the MM and SER clear  LT: subtle clear fluid present in the MM that is draining in the back to the nasopharynx    The patient tolerated the procedure well without complication.     Laboratory Review:  Beta 2 Transferrin (05/11/2022): Positive    *Laboratory results were independently reviewed*    Imaging Review:  CT Maxillofacial WO Contrast (07/02/2015):  IMPRESSION:   Improvement in mucosal thickening in the paranasal sinuses and nasal cavity bilaterally since the previous exam. 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    MR Brain and the Skullbase W/O & W Contrast (12/15/2014):  IMPRESSION:  1. Possible acute  left mastoiditis. Clinical correlation recommended.  2. Otherwise, normal brain MRI.  3. Normal MRI of the skull base and internal auditory canals  bilaterally.        I have reviewed both CT and MRIs.  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      Pathology Review:  n/a    Assessment/Medical Decision Making:  CSF rhinorrhea  Chronic sinusitis as noted above    We discussed his imaging and beta 2 transferrin test confirm he has a CSF leak. We discussed the surgery, postoperative course, risks, and benefits.  We discussed the etiology of CSF leaks at length.  He is amenable to surgery, and we will proceed with the scheduling process.    I recommended getting updated CT and MRI scans prior to surgery, and he can do these closer to home.    Plan:  We will schedule EEA for repair of left ethmoid roof skull base defect and removal of encephalocele  Needs updated CT and MRI prior to surgery  Consult with Dr. Pride    Return to clinic postop.    Sin Patel MD    [facility]  Rhinology  Endoscopic Skull Base Surgery  [facility]  Department of Otolaryngology - Head & Neck Surgery    Scribe Disclosure:  I, Lyla Pena, am serving as a scribe to document services personally performed by Sin Patel MD at this visit, based upon the provider's statements to me. All documentation has been reviewed by the aforementioned provider prior to being entered into the official medical record.         Again, thank you for allowing me to participate in the care of your patient.      Sincerely,    Sin Patel MD

## 2022-05-20 NOTE — NURSING NOTE
"Chief Complaint   Patient presents with     Consult     CSF leak    Blood pressure (!) 166/104, pulse 91, temperature 98.7  F (37.1  C), temperature source Temporal, height 1.88 m (6' 2\"), weight 124.7 kg (275 lb), SpO2 94 %. Purvi Das, EMT  "

## 2022-05-20 NOTE — LETTER
Date:May 23, 2022      Patient was self referred, no letter generated. Do not send.        [facility] Health Information

## 2022-05-23 ENCOUNTER — TELEPHONE (OUTPATIENT)
Dept: OTOLARYNGOLOGY | Facility: CLINIC | Age: 67
End: 2022-05-23
Payer: MEDICARE

## 2022-05-23 NOTE — TELEPHONE ENCOUNTER
Left msg to schedule surgery with .    Mehul De Luna on 5/23/2022 at 10:14 AM      Spoke with the patient. Informed him that Pain Management cases have been cancelled for today and that the office will call him to reschedule his procedure when decisions are made. He voiced his understanding.

## 2022-05-23 NOTE — TELEPHONE ENCOUNTER
Spoke to pt offered soonest with  8/2, pt accepted asked to put on a waitlist if possible because he would like to be seen asap. Pt advised pre op needed with in 30 days and covid test with in 4 days.

## 2022-05-24 ENCOUNTER — PREP FOR PROCEDURE (OUTPATIENT)
Dept: NEUROSURGERY | Facility: CLINIC | Age: 67
End: 2022-05-24
Payer: MEDICARE

## 2022-05-25 ENCOUNTER — TELEPHONE (OUTPATIENT)
Dept: OTOLARYNGOLOGY | Facility: CLINIC | Age: 67
End: 2022-05-25
Payer: MEDICARE

## 2022-05-25 NOTE — TELEPHONE ENCOUNTER
FUTURE VISIT INFORMATION      SURGERY INFORMATION:    Date: 22    Location: [facility] or    Surgeon:  Sin Patel MD Venteicher, Andrew Sean, MD    Anesthesia Type:  general    Procedure: endoscopic image-guided resection of encephalocele and repair of Cerebrospinal Fluid (CSF) Leak, nasoseptal flap possible fascia ludwin graft, fat graft possible INSERTION, DRAIN, SPINE, LUMBAR    Consult: ov     RECORDS REQUESTED FROM:       Primary Care Provider: [facility]    Pertinent Medical History: hypertension    Most recent EKG+ [id number]

## 2022-05-25 NOTE — TELEPHONE ENCOUNTER
RECORDS RECEIVED FROM: internal   REASON FOR VISIT: encephalocele from a defect of the left lateral lamella per Dr Patel    Date of Appt: 7/13/22   NOTES (FOR ALL VISITS) STATUS DETAILS   OFFICE NOTE from referring provider Internal Dr Patel @ [facility] ENT:  5/20/22   OFFICE NOTE from other specialist Internal Dr Man Sahni @ [facility]:  5/11/22   DISCHARGE REPORT from the ER Internal [facility]:  3/11/22  7/16/21   MEDICATION LIST Internal    IMAGING  (FOR ALL VISITS)     MRI (HEAD, NECK, SPINE) Internal [facility]:  MRI Brain 12/5/14   CT (HEAD, NECK, SPINE) Internal [facility]:  CT Maxillofacial 7/2/15

## 2022-05-25 NOTE — TELEPHONE ENCOUNTER
Left voicemail  to schedule imaging in [place] per pt request pt would like to have imaging done closer to home.    Mehul De Luna on 5/25/2022 at 8:10 AM

## 2022-06-15 ENCOUNTER — TELEPHONE (OUTPATIENT)
Dept: OTOLARYNGOLOGY | Facility: CLINIC | Age: 67
End: 2022-06-15
Payer: MEDICARE

## 2022-06-15 NOTE — TELEPHONE ENCOUNTER
[facility] Call Center    Phone Message    May a detailed message be left on voicemail: yes     Reason for Call: Other: Pts wife grace wanting to know why surgery has to be done at [facility] and wants to know if it could be done closer to home  Please reach out to pts wife to discuss     Action Taken: Other: [facility] [place]    Travel Screening: Not Applicable

## 2022-06-16 NOTE — TELEPHONE ENCOUNTER
LM that Dr. Patel only does surgeries here at the [facility], and that pt's procedure is scheduled at the hospital. CB number provided with any further questions.

## 2022-06-17 NOTE — TELEPHONE ENCOUNTER
Pt's wife called to check again if surgery can be done closer to home. Writer explained that his surgery would need to be at the hospital and it must be our hospital here in [place], states understanding. Wife is also stating that in the last few months she has noticed changes in symptoms of the patient including intermittent headaches, mood changes with increased irritability, and an increase of nasal drainage. Denies balance or vision changes. Wife also states pt does not have the covid vaccine and her and pt are concerned that surgeons were going to administer a dose during surgery. Writer assured pt that this will not happen and we never do this to patients, states understanding. Writer also reviewed upcoming imaging and MD appts.     Wife wondering if there is a way to move surgery date up due to small symptom changes. Writer to forward to MD's and surgery scheduling for input. We will call the patient if there are any changes to the plan of care, pt states agreement to the plan.

## 2022-06-25 NOTE — TELEPHONE ENCOUNTER
Left message for patient indicating that a sooner date for surgery has become available. Asked patient to call back to discuss date option.     Kelly Chery on 6/25/2022 at 9:40 AM

## 2022-06-27 NOTE — TELEPHONE ENCOUNTER
Returned call regarding moving surgery up from Aug. 2nd Isamar Hernandez on 6/27/2022 at 11:07 AM

## 2022-06-27 NOTE — TELEPHONE ENCOUNTER
"Called patient regarding moving surgery sooner to 7/5/2022. After lengthy discussion and reviewing patients appointments multiple times via phone. He has decided that moving surgery sooner is too stressful. Patient moved back to 8/2/2022 as orginially planned.   Patient asked \"wheres Dr. Patel?\" and was unsure why he was meeting with neurosurgery. Confirmed that Dr. Patel will be doing surgery and Dr. Pride is the neurosurgeon requested by Dr. Patel.Informed patient highly unlikely that surgery would be moved to any date sooner than what is scheduled.  He will come to his appts 7/13/2022.    Kelly Chery on 6/27/2022 at 2:04 PM    "

## 2022-07-09 NOTE — PROGRESS NOTES
[facility]  Department of Neurosurgery  [facility] Pit[facility]    July 13, 2022    Reason for visit:  Spontaneous sinonasal CSF leak, new patient visit    Dear Dr. Sahni,    It was a pleasure to see Mr. Selby in the Center for Sk[facility] Pituitary Surgery today as a new patient.  As you recall, Mr. Selby is a 67-year-old right-handed male who has a history of multiple nasal traumas.  He underwent sinus surgery in May 2014 with Dr. Sandoval.  He then was lost to follow-up but then represented to the emergency room with cough, congestion, and chest tightness.He was given a prednisone taper. He then represented to the emergency room with sinusitis symptoms and he was given amoxicillin.  He then saw you after he developed clear rhinorrhea from his left nasal cavity.  Imaging demonstrated a possible dehiscence of the anterior fossa.  Fluid was sent for beta-2 transferrin testing that returned positive.  He was therefore referred to Dr. Patel for surgical management.  My role will be to provide assistance if necessary on standby.  No spinal surgeries but has had degenerative changes at L4-5.    Past medical history: Allergic rhinitis, GERD, cholecystectomy, ethmoidectomies, tonsillectomy, exploratory tympanotomy    Medications: Qvar, ipratropium, albuterol, fluticasone    Allergies: Advair, amlodipine, lisinopril, losartan, chlorthalidone    Social history: He is  and is a daily smoker.  Two sons. He has smoked for 40 years approximately half a pack a day. Unemployed but formerly a .    Family history: Diabetes and lung disease with his mother, hypertension and CAD with his father    Exam: He is fluent with speech and very pleasant.  His extraocular movements are intact.  His visual fields are full to confrontation.  Facial sensation is intact.  Face is symmetric with activation.  Tongue is midline.  There is no pronator drift.  Full strength in all extremities.   Sensation is intact throughout.    Labs: The beta-2 transferrin test in May 2022 was positive    Imaging: We reviewed his prior CT from 2015 and MRI from 2014.  There is a defect along the anterior fossa in the left ethmoidal sinuses with soft tissue at the superior aspect of the sinonasal cavity.  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  He underwent MRI and CT scans today which demonstrate T2 signal and small area of dehiscence through the cribriform plate on the left side, similar to the scan from 2014.    Assessment:  1.  History of nasal trauma and sinus surgery in 2014 at [facility]  2.  CSF rhinorrhea    Plan:  1.  We reviewed the results of his imaging in setting of the CSF rhinorrhea and beta-2 transferrin testing.  We reviewed the options for management including medical management versus endonasal repair.  We discussed the rationale for offering surgery as to prevent meningitis.  We also discussed that factory cases of CSF leak may in fact be due to elevated intracranial pressure which may reflect a need for CSF diversion such as  shunt placement.  2.  He is scheduled to undergo surgery with Dr. Patel.  I will serve on standby in the event assistance is necessary.  We discussed that lumbar drain placement may be necessary for a assistance and repair of the encephalocele and CSF leak may require both teams.  We discussed the risks including bleeding, infection, recurrence of the CSF leak or encephalocele, need for  shunt placement, neurologic injury, loss of smell, vision loss, stroke.  3.  I recommended having the pneumococcal vaccinations according to CDC guidelines for spontaneous CSF leak for meningitis prevention. He will think about this but is leaning away from it at this time.  4.  He asked excellent questions which answered to the best of my ability.  I encouraged him to contact us should any questions or concerns arise prior to his surgery date.      It has been a pleasure to participate in the care of  your patient. Please feel free to contact me if I may be of any assistance for Mr. Selby.    Sincerely,  Ramos Pride MD      60 minutes spent on the date of the encounter doing chart review, review of outside records, review of test results, interpretation of tests, patient visit, documentation and discussion with other provider(s)

## 2022-07-13 ENCOUNTER — ANESTHESIA EVENT (OUTPATIENT)
Dept: SURGERY | Facility: CLINIC | Age: 67
End: 2022-07-13

## 2022-07-13 ENCOUNTER — OFFICE VISIT (OUTPATIENT)
Dept: NEUROSURGERY | Facility: CLINIC | Age: 67
End: 2022-07-13
Payer: MEDICARE

## 2022-07-13 ENCOUNTER — PRE VISIT (OUTPATIENT)
Dept: SURGERY | Facility: CLINIC | Age: 67
End: 2022-07-13
Payer: MEDICARE

## 2022-07-13 ENCOUNTER — HOSPITAL ENCOUNTER (OUTPATIENT)
Dept: MRI IMAGING | Facility: CLINIC | Age: 67
Discharge: HOME | End: 2022-07-13
Attending: OTOLARYNGOLOGY
Payer: MEDICARE

## 2022-07-13 ENCOUNTER — HOSPITAL ENCOUNTER (OUTPATIENT)
Dept: CT IMAGING | Facility: CLINIC | Age: 67
Discharge: HOME | End: 2022-07-13
Attending: OTOLARYNGOLOGY
Payer: MEDICARE

## 2022-07-13 ENCOUNTER — PRE VISIT (OUTPATIENT)
Dept: NEUROSURGERY | Facility: CLINIC | Age: 67
End: 2022-07-13
Payer: MEDICARE

## 2022-07-13 VITALS
WEIGHT: 266.9 LBS | SYSTOLIC BLOOD PRESSURE: 179 MMHG | HEART RATE: 86 BPM | DIASTOLIC BLOOD PRESSURE: 118 MMHG | OXYGEN SATURATION: 97 % | BODY MASS INDEX: 34.27 KG/M2

## 2022-07-13 DIAGNOSIS — Q01.9 ENCEPHALOCELE (H): Primary | ICD-10-CM

## 2022-07-13 DIAGNOSIS — G96.01 CSF RHINORRHEA: ICD-10-CM

## 2022-07-13 PROCEDURE — 70543 MRI ORBT/FAC/NCK W/O &W/DYE: CPT | Mod: MG

## 2022-07-13 PROCEDURE — G1004 CDSM NDSC: HCPCS

## 2022-07-13 PROCEDURE — 99205 OFFICE O/P NEW HI 60 MIN: CPT | Performed by: NEUROLOGICAL SURGERY

## 2022-07-13 PROCEDURE — 255N000002 HC RX 255 OP 636: Performed by: OTOLARYNGOLOGY

## 2022-07-13 PROCEDURE — A9585 GADOBUTROL INJECTION: HCPCS | Performed by: OTOLARYNGOLOGY

## 2022-07-13 RX ORDER — GADOBUTROL 604.72 MG/ML
12 INJECTION INTRAVENOUS ONCE
Status: COMPLETED | OUTPATIENT
Start: 2022-07-13 | End: 2022-07-13

## 2022-07-13 RX ADMIN — GADOBUTROL 12 ML: 604.72 INJECTION INTRAVENOUS at 14:35

## 2022-07-13 ASSESSMENT — PAIN SCALES - GENERAL: PAINLEVEL: NO PAIN (0)

## 2022-07-13 NOTE — PATIENT INSTRUCTIONS
You were seen today with Dr. Ramos Pride.     Next steps:  Proceed with surgery on 8/2 with Dr. Patel. Dr. Pride will be available to assist as needed.  Contact your primary care provider about receiving the pneumococcal vaccine (information below).      How to Contact Us  Send a [employer] message to your provider.   Call the clinic - your call will be routed appropriately.   ENT Clinic: [phone]   Neurosurgery Clinic: [phone]  To speak directly to an RN Care Coordinator: Monalisa RN: [phone]      PNEUOMOCOCCAL VACCINES  If you have a (cerebrospinal fluid) CSF leak, you should know:  You may be at risk for an infection called pneumococcal meningitis.  Shots (vaccinations) can help prevent this infection.  When someone does get the infection, early treatment can help.    What is pneumococcal meningitis?  Meningitis is an infection of the fluid around the brain. It's very dangerous and can cause brain damage and even death. There are 2 kinds of meningitis, viral and bacterial. Pneumococcal meningitis is a bacterial infection. We have vaccines to help protect against it.    Please note: the meningococcal vaccine often given to teens and college students does not protect against pneumococcal meningitis.    Why should I worry about it?  People who have a CSF leak have more risk for this infection than other people.    What should I do?  If you have a CSF leak, talk with your primary care provider about the vaccines and make sure your shots are up to date.  If you have these symptoms, see a doctor immediately:  Fever and chills  Stiff neck together with a headache  Sick stomach or throwing up (nausea or vomiting)    When should I get the vaccines?  Your age and shot history affect which vaccine you should get and when. Talk to your provider. The vaccine guidelines that we follow are from the Centers for Disease Control (CDC).     Vaccine guidelines for adults with CSF leak:  If you have never had a  pneumococcal vaccine, you should get the PCV20 (Boaxgtk90) or PCV15 (Vaxneuvance).   If you get the PCV20, you do not need any other vaccines.   If you get the PCV15, you will need a second vaccine (the Pneumovax 23) at least 8 weeks later.   If you have previously received a dose of the Pneumovax 23:   You should get 1 dose of the PCV20 (Vncqvli47) or PCV15 (Vaxneuvance) at least 1 year after receiving the Pneumovax 23.   If you have previously received a dose of the Miofdaf10:   You should get 1 dose of the Mbjkkimcz03 at least 8 weeks after receiving the Hxhycst60.   If you have previously received both the Ptmzhmkrk31 and Fjbeluz17:   You do not need any other vaccines. Together, Yxlvasa68 and Slfgxaqtx03 meet the meningitis vaccine guidelines for CSF leak.       To learn more:  Talk to your provider. You can also visit these CDC websites:  https://www.cdc.gov/vaccines/vpd/pneumo/public/index.html  https://www.cdc.gov/vaccines/vpd/pneumo/hcp/dsb-mdah-rh-vaccinate.html

## 2022-07-13 NOTE — LETTER
[place] [facility]  [facility] NEUROSURGERY CLINIC [address]  [address]RD FLO[address]  [facility] [address]  Phone: [phone]  Fax: [phone]          7/13/2022    RE:   Jayme Selby  [address] [place] [address]      Dear Colleague,    Thank you for referring your patient, Jayme Selby, to the Center for Sk[facility] Pituitary Surgery. Please see a copy of my visit note below.      [facility]  Department of Neurosurgery  [place] [facility]    July 13, 2022    Reason for visit:  Spontaneous sinonasal CSF leak, new patient visit    Dear Dr. Sahni,    It was a pleasure to see Mr. Selby in the Center for Sk[facility] Pituitary Surgery today as a new patient.  As you recall, Mr. Selby is a 67-year-old right-handed male who has a history of multiple nasal traumas.  He underwent sinus surgery in May 2014 with Dr. Sandoval.  He then was lost to follow-up but then represented to the emergency room with cough, congestion, and chest tightness.He was given a prednisone taper. He then represented to the emergency room with sinusitis symptoms and he was given amoxicillin.  He then saw you after he developed clear rhinorrhea from his left nasal cavity.  Imaging demonstrated a possible dehiscence of the anterior fossa.  Fluid was sent for beta-2 transferrin testing that returned positive.  He was therefore referred to Dr. Patel for surgical management.  My role will be to provide assistance if necessary on standby.  No spinal surgeries but has had degenerative changes at L4-5.    Past medical history: Allergic rhinitis, GERD, cholecystectomy, ethmoidectomies, tonsillectomy, exploratory tympanotomy    Medications: Qvar, ipratropium, albuterol, fluticasone    Allergies: Advair, amlodipine, lisinopril, losartan, chlorthalidone    Social history: He is  and is a daily smoker.  Two sons. He has smoked for 40 years  approximately half a pack a day. Unemployed but formerly a .    Family history: Diabetes and lung disease with his mother, hypertension and CAD with his father    Exam: He is fluent with speech and very pleasant.  His extraocular movements are intact.  His visual fields are full to confrontation.  Facial sensation is intact.  Face is symmetric with activation.  Tongue is midline.  There is no pronator drift.  Full strength in all extremities.  Sensation is intact throughout.    Labs: The beta-2 transferrin test in May 2022 was positive    Imaging: We reviewed his prior CT from 2015 and MRI from 2014.  There is a defect along the anterior fossa in the left ethmoidal sinuses with soft tissue at the superior aspect of the sinonasal cavity.  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  He underwent MRI and CT scans today which demonstrate T2 signal and small area of dehiscence through the cribriform plate on the left side, similar to the scan from 2014.    Assessment:  1.  History of nasal trauma and sinus surgery in 2014 at [facility]  2.  CSF rhinorrhea    Plan:  1.  We reviewed the results of his imaging in setting of the CSF rhinorrhea and beta-2 transferrin testing.  We reviewed the options for management including medical management versus endonasal repair.  We discussed the rationale for offering surgery as to prevent meningitis.  We also discussed that factory cases of CSF leak may in fact be due to elevated intracranial pressure which may reflect a need for CSF diversion such as  shunt placement.  2.  He is scheduled to undergo surgery with Dr. Patel.  I will serve on standby in the event assistance is necessary.  We discussed that lumbar drain placement may be necessary for a assistance and repair of the encephalocele and CSF leak may require both teams.  We discussed the risks including bleeding, infection, recurrence of the CSF leak or encephalocele, need for  shunt placement, neurologic injury, loss of  smell, vision loss, stroke.  3.  I recommended having the pneumococcal vaccinations according to CDC guidelines for spontaneous CSF leak for meningitis prevention. He will think about this but is leaning away from it at this time.  4.  He asked excellent questions which answered to the best of my ability.  I encouraged him to contact us should any questions or concerns arise prior to his surgery date.      It has been a pleasure to participate in the care of your patient. Please feel free to contact me if I may be of any assistance for Mr. Selby.    Sincerely,  Ramos Pride MD      60 minutes spent on the date of the encounter doing chart review, review of outside records, review of test results, interpretation of tests, patient visit, documentation and discussion with other provider(s)

## 2022-07-13 NOTE — Clinical Note
7/13/2022       RE: Jayme Selby  [address] [facility] [address]     Dear Colleague,    Thank you for referring your patient, Jayme Selby, to the [facility] NEUROSURGERY [facility]INIC [facility]. Please see a copy of my visit note below.    [facility]  Department of Neurosurgery  Center for Skull Base and Pituitary Surgery    July 13, 2022    Reason for visit:  Spontaneous sinonasal CSF leak, new patient visit    Dear Dr. Sahni,    It was a pleasure to see Mr. Selby in the Center for Sk[facility] Base [facility] Pituitary Surgery today as a new patient.  As you recall, Mr. Selby is a 67-year-old right-handed male who has a history of multiple nasal traumas.  He underwent sinus surgery in May 2014 with Dr. Sandoval.  He then was lost to follow-up but then represented to the emergency room with cough, congestion, and chest tightness.He was given a prednisone taper. He then represented to the emergency room with sinusitis symptoms and he was given amoxicillin.  He then saw you after he developed clear rhinorrhea from his left nasal cavity.  Imaging demonstrated a possible dehiscence of the anterior fossa.  Fluid was sent for beta-2 transferrin testing that returned positive.  He was therefore referred to Dr. Patel for surgical management.  My role will be to provide assistance if necessary on standby.  No spinal surgeries but has had degenerative changes at L4-5.    Past medical history: Allergic rhinitis, GERD, cholecystectomy, ethmoidectomies, tonsillectomy, exploratory tympanotomy    Medications: Qvar, ipratropium, albuterol, fluticasone    Allergies: Advair, amlodipine, lisinopril, losartan, chlorthalidone    Social history: He is  and is a daily smoker.  Two sons. He has smoked for 40 years approximately half a pack a day. Unemployed but formerly a .    Family history: Diabetes and lung disease with his mother, hypertension  and CAD with his father    Exam: He is fluent with speech and very pleasant.  His extraocular movements are intact.  His visual fields are full to confrontation.  Facial sensation is intact.  Face is symmetric with activation.  Tongue is midline.  There is no pronator drift.  Full strength in all extremities.  Sensation is intact throughout.    Labs: The beta-2 transferrin test in May 2022 was positive    Imaging: We reviewed his prior CT from 2015 and MRI from 2014.  There is a defect along the anterior fossa in the left ethmoidal sinuses with soft tissue at the superior aspect of the sinonasal cavity.  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  He underwent MRI and CT scans today which demonstrate T2 signal and small area of dehiscence through the cribriform plate on the left side, similar to the scan from 2014.    Assessment:  1.  History of nasal trauma and sinus surgery in 2014 at [facility]  2.  CSF rhinorrhea    Plan:  1.  We reviewed the results of his imaging in setting of the CSF rhinorrhea and beta-2 transferrin testing.  We reviewed the options for management including medical management versus endonasal repair.  We discussed the rationale for offering surgery as to prevent meningitis.  We also discussed that factory cases of CSF leak may in fact be due to elevated intracranial pressure which may reflect a need for CSF diversion such as  shunt placement.  2.  He is scheduled to undergo surgery with Dr. Patel.  I will serve on standby in the event assistance is necessary.  We discussed that lumbar drain placement may be necessary for a assistance and repair of the encephalocele and CSF leak may require both teams.  We discussed the risks including bleeding, infection, recurrence of the CSF leak or encephalocele, need for  shunt placement, neurologic injury, loss of smell, vision loss, stroke.  3.  I recommended having the pneumococcal vaccinations according to CDC guidelines for spontaneous CSF leak for  meningitis prevention. He will think about this but is leaning away from it at this time.  4.  He asked excellent questions which answered to the best of my ability.  I encouraged him to contact us should any questions or concerns arise prior to his surgery date.      It has been a pleasure to participate in the care of your patient. Please feel free to contact me if I may be of any assistance for Mr. Selby.    Sincerely,  Ramos Pride MD      60 minutes spent on the date of the encounter doing chart review, review of outside records, review of test results, interpretation of tests, patient visit, documentation and discussion with other provider(s)          Again, thank you for allowing me to participate in the care of your patient.      Sincerely,    Ramos Pride MD

## 2022-07-19 ENCOUNTER — OFFICE VISIT (OUTPATIENT)
Dept: FAMILY MEDICINE | Facility: CLINIC | Age: 67
End: 2022-07-19
Payer: MEDICARE

## 2022-07-19 VITALS
WEIGHT: 266.6 LBS | BODY MASS INDEX: 34.22 KG/M2 | HEIGHT: 74 IN | RESPIRATION RATE: 18 BRPM | OXYGEN SATURATION: 99 % | DIASTOLIC BLOOD PRESSURE: 108 MMHG | HEART RATE: 85 BPM | SYSTOLIC BLOOD PRESSURE: 154 MMHG | TEMPERATURE: 99.3 F

## 2022-07-19 DIAGNOSIS — Z01.818 PREOP GENERAL PHYSICAL EXAM: Primary | ICD-10-CM

## 2022-07-19 DIAGNOSIS — Z11.59 SCREENING FOR VIRAL DISEASE: ICD-10-CM

## 2022-07-19 DIAGNOSIS — I10 UNCONTROLLED HYPERTENSION: ICD-10-CM

## 2022-07-19 DIAGNOSIS — G96.00 CSF LEAK: ICD-10-CM

## 2022-07-19 PROBLEM — E66.01 MORBID OBESITY (H): Status: RESOLVED | Noted: 2019-04-19 | Resolved: 2022-07-19

## 2022-07-19 LAB
ANION GAP SERPL CALCULATED.3IONS-SCNC: 9 MMOL/L (ref 3–14)
BUN SERPL-MCNC: 12 MG/DL (ref 7–30)
CALCIUM SERPL-MCNC: 9.1 MG/DL (ref 8.5–10.1)
CHLORIDE BLD-SCNC: 104 MMOL/L (ref 94–109)
CO2 SERPL-SCNC: 25 MMOL/L (ref 20–32)
CREAT SERPL-MCNC: 1.08 MG/DL (ref 0.66–1.25)
ERYTHROCYTE [DISTWIDTH] IN BLOOD BY AUTOMATED COUNT: 13.5 % (ref 10–15)
GFR SERPL CREATININE-BSD FRML MDRD: 76 ML/MIN/1.73M2
GLUCOSE BLD-MCNC: 99 MG/DL (ref 70–99)
HCT VFR BLD AUTO: 47.2 % (ref 40–53)
HGB BLD-MCNC: 15.7 G/DL (ref 13.3–17.7)
MCH RBC QN AUTO: 28.8 PG (ref 26.5–33)
MCHC RBC AUTO-ENTMCNC: 33.3 G/DL (ref 31.5–36.5)
MCV RBC AUTO: 87 FL (ref 78–100)
PLATELET # BLD AUTO: 257 10E3/UL (ref 150–450)
POTASSIUM BLD-SCNC: 4.2 MMOL/L (ref 3.4–5.3)
RBC # BLD AUTO: 5.45 10E6/UL (ref 4.4–5.9)
SODIUM SERPL-SCNC: 138 MMOL/L (ref 133–144)
WBC # BLD AUTO: 9.5 10E3/UL (ref 4–11)

## 2022-07-19 PROCEDURE — 85027 COMPLETE CBC AUTOMATED: CPT | Performed by: NURSE PRACTITIONER

## 2022-07-19 PROCEDURE — 36415 COLL VENOUS BLD VENIPUNCTURE: CPT | Performed by: NURSE PRACTITIONER

## 2022-07-19 PROCEDURE — 80048 BASIC METABOLIC PNL TOTAL CA: CPT | Performed by: NURSE PRACTITIONER

## 2022-07-19 PROCEDURE — 99214 OFFICE O/P EST MOD 30 MIN: CPT | Performed by: NURSE PRACTITIONER

## 2022-07-19 NOTE — PATIENT INSTRUCTIONS
.[email or web address]        Preparing for Your Surgery  Getting started  A nurse will call you to review your health history and instructions. They will give you an arrival time based on your scheduled surgery time. Please be ready to share:    Your doctor's clinic name and phone number    Your medical, surgical and anesthesia history    A list of allergies and sensitivities    A list of medicines, including herbal treatments and over-the-counter drugs    Whether the patient has a legal guardian (ask how to send us the papers in advance)  Please tell us if you're pregnant--or if there's any chance you might be pregnant. Some surgeries may injure a fetus (unborn baby), so they require a pregnancy test. Surgeries that are safe for a fetus don't always need a test, and you can choose whether to have one.   If you have a child who's having surgery, please ask for a copy of Preparing for Your Child's Surgery.    Preparing for surgery  1. Within 30 days of surgery: Have a pre-op exam (sometimes called an H&P, or History and Physical). This can be done at a clinic or pre-operative center.  ? If you're having a , you may not need this exam. Talk to your care team.  2. At your pre-op exam, talk to your care team about all medicines you take. If you need to stop any medicines before surgery, ask when to start taking them again.  ? We do this for your safety. Many medicines can make you bleed too much during surgery. Some change how well surgery (anesthesia) drugs work.  3. Call your insurance company to let them know you're having surgery. (If you don't have insurance, call [phone].)  4. Call your clinic if there's any change in your health. This includes signs of a cold or flu (sore throat, runny nose, cough, rash, fever). It also includes a scrape or scratch near the surgery site.  5. If you have questions on the day of surgery, call your hospital or surgery center.  COVID testing  You may need to be tested for COVID-19  before having surgery. If so, we will give you instructions.  Eating and drinking guidelines  For your safety: Unless your surgeon tells you otherwise, follow the guidelines below.    Eat and drink as usual until 8 hours before surgery. After that, no food or milk.    Drink clear liquids until 2 hours before surgery. These are liquids you can see through, like water, Gatorade and Propel Water. You may also have black coffee and tea (no cream or milk).    Nothing by mouth within 2 hours of surgery. This includes gum, candy and breath mints.    If you drink alcohol: Stop drinking it the night before surgery.    If your care team tells you to take medicine on the morning of surgery, it's okay to take it with a sip of water.  Preventing infection  1. Shower or bathe the night before and morning of your surgery. Follow the instructions your clinic gave you. (If no instructions, use regular soap.)  2. Don't shave or clip hair near your surgery site. We'll remove the hair if needed.  3. Don't smoke or vape the morning of surgery. You may chew nicotine gum up to 2 hours before surgery. A nicotine patch is okay.  ? Note: Some surgeries require you to completely quit smoking and nicotine. Check with your surgeon.  4. Your care team will make every effort to keep you safe from infection. We will:  ? Clean our hands often with soap and water (or an alcohol-based hand rub).  ? Clean the skin at your surgery site with a special soap that kills germs.  ? Give you a special gown to keep you warm. (Cold raises the risk of infection.)  ? Wear special hair covers, masks, gowns and gloves during surgery.  ? Give antibiotic medicine, if prescribed. Not all surgeries need antibiotics.  What to bring on the day of surgery  1. Photo ID and insurance card  2. Copy of your health care directive, if you have one  3. Glasses and hearing aides (bring cases)  ? You can't wear contacts during surgery  4. Inhaler and eye drops, if you use them  (tell us about these when you arrive)  5. CPAP machine or breathing device, if you use them  6. A few personal items, if spending the night  7. If you have . . .  ? A pacemaker, ICD (cardiac defibrillator) or other implant: Bring the ID card.  ? An implanted stimulator: Bring the remote control.  ? A legal guardian: Bring a copy of the certified (court-stamped) guardianship papers.  Please remove any jewelry, including body piercings. Leave jewelry and other valuables at home.  If you're going home the day of surgery    You must have a responsible adult drive you home. They should stay with you overnight as well.    If you don't have someone to stay with you, and you aren't safe to go home alone, we may keep you overnight. Insurance often won't pay for this.  After surgery  If it's hard to control your pain or you need more pain medicine, please call your surgeon's office.  Questions?   If you have any questions for your care team, list them here: _________________________________________________________________________________________________________________________________________________________________________ ____________________________________ ____________________________________ ____________________________________  For informational purposes only. Not to replace the advice of your health care provider. Copyright   2003, 2019 [facility]. All rights reserved. Clinically reviewed by Janelle Hernandez MD. [employer]ender [id number] - REV 07/21.      The Benefits of Living Tobacco-Free  What do you want to improve in your life by quitting tobacco? Whether you smoke cigarettes, e-cigarettes, cigars, or a pipe, or use smokeless tobacco, there are many reasons to quit. Check off some reasons you want to be tobacco-free.  Health benefits  ___  I want to breathe without coughing or feeling short of breath.  ___  I want to reduce my risk for lung cancer, oral cancer, heart disease, bone problems such as osteoporosis  and fractures, and chronic lung disease. Or reduce my risk for a serious lung injury called EVALI that may happen from vaping or using e-cigarettes.  ___  I want to have fewer wrinkles and softer skin.  ___  I want to improve my sense of taste and smell.  ___  For pregnant women: I want to reduce my risk for a miscarriage, stillbirth,  birth, or a low-birth-weight baby.  Personal benefits  ___  I want to be able to walk, go up stairs, and exercise without becoming out of breath.  ___  I want to feel more in control of my life.  ___  I want to have better-smelling hair, clothes, home, and car.  ___  I want to save time by not having to take smoke breaks, buy tobacco products, or hunt for a light.  ___  I want to have whiter teeth and fresher breath.  Family benefits  ___  I want to reduce my child's respiratory tract infections.  ___  I want to set a healthy example for my child.  ___  I want to have the energy needed to play with my children and not become out-of-breath  ___  I want to reduce my family s cancer risk.  Financial benefits  ___  I want to save hundreds of dollars each year that would be spent on tobacco products.  ___  I want to save money on medical bills.  ___  I want to save money on life, health, and car insurance premiums.  How much do you spend?  A tobacco habit is expensive. Do you know how much you spend on tobacco each year? Fill in the blanks below to find out:  A. How much do you pay for 1 pack of cigarettes, 1 cigar, 1 pouch of pipe tobacco, or 1 can of smokeless tobacco? $________  B. How many packs, cigars, pouches, or cans do you use each day? _______________  C. Multiply answer A with answer B:___________________  D. Multiply answer C with 365: $___________________. This is your yearly cost of using tobacco.  Besides the cost of buying tobacco, there are other costs. These include:    Costs of cleaning clothing, furniture, and car    Replacement costs for clothing and  furniture    Medical costs for tobacco-related illnesses    Missed days of work because of illness    Higher health, life, and car insurance premiums  Get help    QuitNow, www.cdc.gov/tobacco/quit_smoking/, 800-QUIT-NOW (808-720-6055).    QuitLine, www.smokefree.gov, 877-44U-QUIT (399-592-1084).    [employer] last reviewed this educational content on 4/1/2020 2000-2021 [employer]. All rights reserved. This information is not intended as a substitute for professional medical care. Always follow your healthcare professional's instructions.

## 2022-07-19 NOTE — PROGRESS NOTES
[facility]  [address]  [facility] [address]  Phone: [phone]  Primary Provider: [facility]  Pre-op Performing Provider: BRIJESH OSMAN      PREOPERATIVE EVALUATION:  Today's date: 7/19/2022    Jayme Selby is a 67 year old male who presents for a preoperative evaluation.    Surgical Information:  Surgery/Procedure: endoscopic image-guided resection of encephlocele and repair of cerebrospinal fluid leak, nasoseptal flap; possible fascia ludwin graft, fat graft  Surgery Location: [facility] OR   Surgeon: Dr. Patel, Dr. Pride  Surgery Date: 8/2/22  Time of Surgery: 0830  Where patient plans to recover: At home with family  Fax number for surgical facility: Note does not need to be faxed, will be available electronically in Epic.    Type of Anesthesia Anticipated: General    Assessment & Plan     The proposed surgical procedure is considered INTERMEDIATE risk.    Preop general physical exam    - Basic metabolic panel  (Ca, Cl, CO2, Creat, Gluc, K, Na, BUN); Future  - CBC with platelets; Future  - CBC with platelets  - Basic metabolic panel  (Ca, Cl, CO2, Creat, Gluc, K, Na, BUN)    CSF leak      Uncontrolled hypertension      Screening for viral disease    - Asymptomatic COVID-19 Virus (Coronavirus) by PCR         Risks and Recommendations:  The patient has the following additional risks and recommendations for perioperative complications:  Cardiovascular:   - uncontrolled hypertension  Social and Substance:    - Active nicotine user, advised smoking cessation    Medication Instructions:  Patient is to take all scheduled medications on the day of surgery    RECOMMENDATION:  APPROVAL GIVEN to proceed with proposed procedure, without further diagnostic evaluation.          Subjective     HPI related to upcoming procedure: CSF leak, hx of multiple sinus/ear surgeries    Preop Questions 7/19/2022   1. Have you ever had a heart attack or stroke? No   2. Have  you ever had surgery on your heart or blood vessels, such as a stent placement, a coronary artery bypass, or surgery on an artery in your head, neck, heart, or legs? No   3. Do you have chest pain with activity? No   4. Do you have a history of  heart failure? No   5. Do you currently have a cold, bronchitis or symptoms of other infection? No   6. Do you have a cough, shortness of breath, or wheezing? No   7. Do you or anyone in your family have previous history of blood clots? No   8. Do you or does anyone in your family have a serious bleeding problem such as prolonged bleeding following surgeries or cuts? No   9. Have you ever had problems with anemia or been told to take iron pills? No   10. Have you had any abnormal blood loss such as black, tarry or bloody stools? No   11. Have you ever had a blood transfusion? No   12. Are you willing to have a blood transfusion if it is medically needed before, during, or after your surgery? NO - doesn't want blood from anyone who has gotten the COVID vaccine    13. Have you or any of your relatives ever had problems with anesthesia? No   14. Do you have sleep apnea, excessive snoring or daytime drowsiness? No   15. Do you have any artifical heart valves or other implanted medical devices like a pacemaker, defibrillator, or continuous glucose monitor? No   16. Do you have artificial joints? No   17. Are you allergic to latex? No       Health Care Directive:  Patient does not have a Health Care Directive or Living Will: Discussed advance care planning with patient; however, patient declined at this time.    Preoperative Review of :   reviewed - no record of controlled substances prescribed.    PDMP Review       Value Time User    State PDMP site checked  Yes 7/19/2022  5:31 PM Lili Brasher APRN CNP            Status of Chronic Conditions:  ASTHMA - Patient has a longstanding history of moderate-severe Asthma . Patient has been doing well overall noting NO  SYMPTOMS and continues on medication regimen consisting of QVAR and PRN Albuterol without adverse reactions or side effects.     HYPERTENSION - Patient has longstanding history of HTN , currently denies any symptoms referable to elevated blood pressure. Specifically denies chest pain, palpitations, dyspnea, orthopnea, PND or peripheral edema. Blood pressure readings have not been in normal range. Refuses medications as he has side effects to them all and has tried multiple- see problem list.      Review of Systems  CONSTITUTIONAL: NEGATIVE for fever, chills, change in weight  INTEGUMENTARY/SKIN: NEGATIVE for worrisome rashes, moles or lesions  EYES: NEGATIVE for vision changes or irritation  ENT/MOUTH: POSITIVE for Hx sinus infections  RESP:Hx asthma  CV: NEGATIVE for chest pain, palpitations or peripheral edema  GI: NEGATIVE for nausea, abdominal pain, heartburn, or change in bowel habits  : NEGATIVE for frequency, dysuria, or hematuria  MUSCULOSKELETAL: NEGATIVE for significant arthralgias or myalgia  NEURO: NEGATIVE for weakness, dizziness or paresthesias  ENDOCRINE: NEGATIVE for temperature intolerance, skin/hair changes  HEME: NEGATIVE for bleeding problems  PSYCHIATRIC: NEGATIVE for changes in mood or affect    Patient Active Problem List    Diagnosis Date Noted     Obesity (BMI 35.0-39.9) with comorbidity (H) 04/19/2019     Priority: Medium     Sigmoid diverticulitis 06/28/2016     Priority: Medium     Essential hypertension with goal blood pressure less than 140/90 06/23/2016     Priority: Medium     Mild intermittent asthma without complication 10/27/2015     Priority: Medium     Diagnosis updated by automated process. Provider to review and confirm.       Allergic rhinitis due to animal dander      Priority: Medium     12/5/14 IgE tests pos. only for cat/dog/T (all other environmental allergens NEGATIVE)       Seasonal allergic rhinitis      Priority: Medium     Diagnostic skin and sensitization tests  (aka ALLERGENS)      Priority: Medium     Sinusitis, chronic 10/23/2014     Priority: Medium     Advanced directives, counseling/discussion 04/22/2014     Priority: Medium     Patient does not have an Advance/Health Care Directive (HCD), declines information/referral.    Gracy Byers  April 22, 2014         Hyperlipidemia LDL goal <130 02/25/2014     Priority: Medium     Score not calculated. Missing: Total Cholesterol, HDL  Based history of hypertension and smoking and age has greater than 2 risk factors for CHD.       Tobacco use disorder 02/20/2014     Priority: Medium     Allergic rhinitis due to pollen 02/20/2014     Priority: Medium      Past Medical History:   Diagnosis Date     Allergic rhinitis due to animal dander     12/5/14 IgE tests pos. only for cat/dog/T (all other environmental allergens NEGATIVE)     Asthma      CSF rhinorrhea      Diagnostic skin and sensitization tests (aka ALLERGENS) 12/05/2014     GERD (gastroesophageal reflux disease)      H. pylori infection 1999     HLD (hyperlipidemia)      HTN (hypertension)      Obesity      Seasonal allergic rhinitis      Tobacco use      Past Surgical History:   Procedure Laterality Date     CHOLECYSTECTOMY  09/1999     ETHMOIDECTOMY  5/7/2014    Procedure: ETHMOIDECTOMY;  Surgeon: Kai Sandoval MD;  Location: [facility] OR     ETHMOIDECTOMY  6/4/2014    Procedure: ETHMOIDECTOMY;  Surgeon: Kai Sandoval MD;  Location: [facility] OR     TONSILLECTOMY       TYMPANOTOMY EXPLORATORY Left 1/29/2015    Procedure: TYMPANOTOMY EXPLORATORY;  Surgeon: Michel He MD;  Location: [facility] OR     Current Outpatient Medications   Medication Sig Dispense Refill     albuterol (PROAIR HFA) 108 (90 Base) MCG/ACT inhaler INHALE 2 PUFFS INTO THE LUNGS EVERY 4 HOURS AS NEEDED 6.7 g 11     ascorbic acid (VITAMIN C) 250 MG CHEW Take 500 mg by mouth daily       beclomethasone HFA (QVAR REDIHALER) 80 MCG/ACT inhaler Inhale 1 puff into the lungs 2 times daily During  "allergy seasons 10.6 g 11     calcium carbonate (TUMS) 500 MG chewable tablet Take 1 chew tab by mouth as needed for heartburn        cetirizine (ZYRTEC) 10 MG tablet Take 10 mg by mouth daily       fluticasone (FLONASE) 50 MCG/ACT nasal spray USE 2 SPRAYS IN EACH NOSTRIL EVERY DAY AS NEEDED 16 g 10     ipratropium (ATROVENT) 0.03 % nasal spray Spray 2 sprays into both nostrils 2 times daily 30 mL 11     Zinc Sulfate (ZINC 15 PO)        multivitamin w/minerals (THERA-VIT-M) tablet Take 1 tablet by mouth daily (Patient not taking: Reported on 7/19/2022)         Allergies   Allergen Reactions     Advair Diskus Other (See Comments)     Adverse reaction per previous medical records.  Vision changes     Amlodipine Cough     Flu Like Symptoms       Lisinopril Cough     Per patient.     Losartan Cough     Flu like symptoms       Chlorthalidone Other (See Comments) and Rash     Lesions on Face, Chest Pressure          Social History     Tobacco Use     Smoking status: Current Every Day Smoker     Packs/day: 0.50     Years: 40.00     Pack years: 20.00     Types: Cigarettes     Smokeless tobacco: Never Used     Tobacco comment: smokes natural cigerettes not in box   Substance Use Topics     Alcohol use: Not Currently       History   Drug Use No         Objective     BP (!) 154/108   Pulse 85   Temp 99.3  F (37.4  C) (Tympanic)   Resp 18   Ht 1.88 m (6' 2\")   Wt 120.9 kg (266 lb 9.6 oz)   SpO2 99%   BMI 34.23 kg/m      Physical Exam    GENERAL APPEARANCE: healthy, alert and no distress     EYES: EOMI,  PERRL     HENT: ear canals and TM's normal, oral mucous membranes moist and oropharynx clear     NECK: no adenopathy, no asymmetry, masses, or scars and thyroid normal to palpation     RESP: lungs clear to auscultation - no rales, rhonchi or wheezes     CV: regular rates and rhythm, normal S1 S2, no S3 or S4 and no murmur, click or rub     ABDOMEN: soft, nontender, without hepatosplenomegaly or masses, no palpable masses " and no renal abnormalities palpable     MS: extremities normal- no gross deformities noted, no evidence of inflammation in joints, FROM in all extremities.     SKIN: no suspicious lesions or rashes     NEURO: Normal strength and tone, sensory exam grossly normal, mentation intact and speech normal     PSYCH: mentation appears normal. and affect normal/bright     LYMPHATICS: No cervical adenopathy    No results for input(s): HGB, PLT, INR, NA, POTASSIUM, CR, A1C in the last 29401 hours.     Diagnostics:  Labs pending at this time.  Results will be reviewed when available.   No EKG required, no history of coronary heart disease, significant arrhythmia, peripheral arterial disease or other structural heart disease.    Revised Cardiac Risk Index (RCRI):  The patient has the following serious cardiovascular risks for perioperative complications:   - No serious cardiac risks = 0 points     RCRI Interpretation: 0 points: Class I (very low risk - 0.4% complication rate)           Signed Electronically by: STEPAN Barreto CNP  Copy of this evaluation report is provided to requesting physician.

## 2022-07-20 NOTE — RESULT ENCOUNTER NOTE
Jamison Delacruz    Your lab results came back within normal limits. Please let us know if you have any questions.     Take care,    STEPAN Pittman CNP

## 2022-07-29 ENCOUNTER — LAB (OUTPATIENT)
Dept: LAB | Facility: CLINIC | Age: 67
End: 2022-07-29

## 2022-07-29 PROCEDURE — U0005 INFEC AGEN DETEC AMPLI PROBE: HCPCS | Performed by: NURSE PRACTITIONER

## 2022-07-29 PROCEDURE — U0003 INFECTIOUS AGENT DETECTION BY NUCLEIC ACID (DNA OR RNA); SEVERE ACUTE RESPIRATORY SYNDROME CORONAVIRUS 2 (SARS-COV-2) (CORONAVIRUS DISEASE [COVID-19]), AMPLIFIED PROBE TECHNIQUE, MAKING USE OF HIGH THROUGHPUT TECHNOLOGIES AS DESCRIBED BY CMS-2020-01-R: HCPCS | Performed by: NURSE PRACTITIONER

## 2022-07-30 LAB — SARS-COV-2 RNA RESP QL NAA+PROBE: NEGATIVE

## 2022-08-01 ENCOUNTER — ANESTHESIA EVENT (OUTPATIENT)
Dept: SURGERY | Facility: CLINIC | Age: 67
DRG: 982 | End: 2022-08-01
Payer: MEDICARE

## 2022-08-01 NOTE — RESULT ENCOUNTER NOTE
Jamison Delacruz    Your pre-op Covid screening is negative. Please let us know if you have any questions.     Take care,    STEPAN Pittman CNP

## 2022-08-02 ENCOUNTER — HOSPITAL ENCOUNTER (INPATIENT)
Facility: CLINIC | Age: 67
LOS: 1 days | Discharge: HOME | DRG: 982 | End: 2022-08-03
Attending: OTOLARYNGOLOGY | Admitting: OTOLARYNGOLOGY
Payer: MEDICARE

## 2022-08-02 ENCOUNTER — ANESTHESIA (OUTPATIENT)
Dept: SURGERY | Facility: CLINIC | Age: 67
DRG: 982 | End: 2022-08-02
Payer: MEDICARE

## 2022-08-02 DIAGNOSIS — G96.01 CSF LEAK FROM NOSE: Primary | ICD-10-CM

## 2022-08-02 PROBLEM — Z48.89 ENCOUNTER FOR POSTOPERATIVE CARE: Status: ACTIVE | Noted: 2022-08-02

## 2022-08-02 LAB — GLUCOSE BLDC GLUCOMTR-MCNC: 139 MG/DL (ref 70–99)

## 2022-08-02 PROCEDURE — 250N000009 HC RX 250: Performed by: ANESTHESIOLOGY

## 2022-08-02 PROCEDURE — 250N000011 HC RX IP 250 OP 636: Performed by: ANESTHESIOLOGY

## 2022-08-02 PROCEDURE — 250N000013 HC RX MED GY IP 250 OP 250 PS 637: Performed by: STUDENT IN AN ORGANIZED HEALTH CARE EDUCATION/TRAINING PROGRAM

## 2022-08-02 PROCEDURE — 272N000004 HC RX 272: Performed by: OTOLARYNGOLOGY

## 2022-08-02 PROCEDURE — 250N000009 HC RX 250: Performed by: OTOLARYNGOLOGY

## 2022-08-02 PROCEDURE — 8E09XBG COMPUTER ASSISTED PROCEDURE OF HEAD AND NECK REGION, WITH COMPUTERIZED TOMOGRAPHY: ICD-10-PCS | Performed by: OTOLARYNGOLOGY

## 2022-08-02 PROCEDURE — 120N000002 HC R&B MED SURG/OB UMMC

## 2022-08-02 PROCEDURE — 250N000011 HC RX IP 250 OP 636: Performed by: OTOLARYNGOLOGY

## 2022-08-02 PROCEDURE — 370N000017 HC ANESTHESIA TECHNICAL FEE, PER MIN: Performed by: OTOLARYNGOLOGY

## 2022-08-02 PROCEDURE — 999N000141 HC STATISTIC PRE-PROCEDURE NURSING ASSESSMENT: Performed by: OTOLARYNGOLOGY

## 2022-08-02 PROCEDURE — 30999 UNLISTED PROCEDURE NOSE: CPT | Mod: GC | Performed by: OTOLARYNGOLOGY

## 2022-08-02 PROCEDURE — 09BV0ZZ EXCISION OF LEFT ETHMOID SINUS, OPEN APPROACH: ICD-10-PCS | Performed by: OTOLARYNGOLOGY

## 2022-08-02 PROCEDURE — 09TL0ZZ RESECTION OF NASAL TURBINATE, OPEN APPROACH: ICD-10-PCS | Performed by: OTOLARYNGOLOGY

## 2022-08-02 PROCEDURE — 258N000003 HC RX IP 258 OP 636: Performed by: ANESTHESIOLOGY

## 2022-08-02 PROCEDURE — 250N000011 HC RX IP 250 OP 636: Performed by: STUDENT IN AN ORGANIZED HEALTH CARE EDUCATION/TRAINING PROGRAM

## 2022-08-02 PROCEDURE — 710N000010 HC RECOVERY PHASE 1, LEVEL 2, PER MIN: Performed by: OTOLARYNGOLOGY

## 2022-08-02 PROCEDURE — 09QM0ZZ REPAIR NASAL SEPTUM, OPEN APPROACH: ICD-10-PCS | Performed by: OTOLARYNGOLOGY

## 2022-08-02 PROCEDURE — 272N000001 HC OR GENERAL SUPPLY STERILE: Performed by: OTOLARYNGOLOGY

## 2022-08-02 PROCEDURE — 61782 SCAN PROC CRANIAL EXTRA: CPT | Mod: GC | Performed by: OTOLARYNGOLOGY

## 2022-08-02 PROCEDURE — 360N000078 HC SURGERY LEVEL 5, PER MIN: Performed by: OTOLARYNGOLOGY

## 2022-08-02 PROCEDURE — 258N000003 HC RX IP 258 OP 636: Performed by: STUDENT IN AN ORGANIZED HEALTH CARE EDUCATION/TRAINING PROGRAM

## 2022-08-02 PROCEDURE — 31290 NASAL/SINUS ENDOSCOPY SURG: CPT | Mod: LT | Performed by: OTOLARYNGOLOGY

## 2022-08-02 PROCEDURE — 250N000025 HC SEVOFLURANE, PER MIN: Performed by: OTOLARYNGOLOGY

## 2022-08-02 PROCEDURE — 09BT4ZZ EXCISION OF LEFT FRONTAL SINUS, PERCUTANEOUS ENDOSCOPIC APPROACH: ICD-10-PCS | Performed by: OTOLARYNGOLOGY

## 2022-08-02 RX ORDER — NALOXONE HYDROCHLORIDE 0.4 MG/ML
0.2 INJECTION, SOLUTION INTRAMUSCULAR; INTRAVENOUS; SUBCUTANEOUS
Status: DISCONTINUED | OUTPATIENT
Start: 2022-08-02 | End: 2022-08-03 | Stop reason: HOSPADM

## 2022-08-02 RX ORDER — OXYCODONE HYDROCHLORIDE 5 MG/1
5 TABLET ORAL EVERY 4 HOURS PRN
Status: DISCONTINUED | OUTPATIENT
Start: 2022-08-02 | End: 2022-08-03 | Stop reason: HOSPADM

## 2022-08-02 RX ORDER — PROPOFOL 10 MG/ML
INJECTION, EMULSION INTRAVENOUS PRN
Status: DISCONTINUED | OUTPATIENT
Start: 2022-08-02 | End: 2022-08-02

## 2022-08-02 RX ORDER — SODIUM CHLORIDE, SODIUM LACTATE, POTASSIUM CHLORIDE, CALCIUM CHLORIDE 600; 310; 30; 20 MG/100ML; MG/100ML; MG/100ML; MG/100ML
INJECTION, SOLUTION INTRAVENOUS CONTINUOUS
Status: DISCONTINUED | OUTPATIENT
Start: 2022-08-02 | End: 2022-08-03 | Stop reason: HOSPADM

## 2022-08-02 RX ORDER — DEXAMETHASONE SODIUM PHOSPHATE 4 MG/ML
INJECTION, SOLUTION INTRA-ARTICULAR; INTRALESIONAL; INTRAMUSCULAR; INTRAVENOUS; SOFT TISSUE PRN
Status: DISCONTINUED | OUTPATIENT
Start: 2022-08-02 | End: 2022-08-02

## 2022-08-02 RX ORDER — OXYCODONE HYDROCHLORIDE 5 MG/1
5 TABLET ORAL EVERY 4 HOURS PRN
Status: DISCONTINUED | OUTPATIENT
Start: 2022-08-02 | End: 2022-08-02 | Stop reason: HOSPADM

## 2022-08-02 RX ORDER — EPINEPHRINE NASAL SOLUTION 1 MG/ML
SOLUTION NASAL PRN
Status: DISCONTINUED | OUTPATIENT
Start: 2022-08-02 | End: 2022-08-02 | Stop reason: HOSPADM

## 2022-08-02 RX ORDER — AMOXICILLIN 250 MG
1 CAPSULE ORAL 2 TIMES DAILY
Status: DISCONTINUED | OUTPATIENT
Start: 2022-08-02 | End: 2022-08-03 | Stop reason: HOSPADM

## 2022-08-02 RX ORDER — POLYETHYLENE GLYCOL 3350 17 G/17G
17 POWDER, FOR SOLUTION ORAL DAILY
Status: DISCONTINUED | OUTPATIENT
Start: 2022-08-03 | End: 2022-08-03 | Stop reason: HOSPADM

## 2022-08-02 RX ORDER — LIDOCAINE 40 MG/G
CREAM TOPICAL
Status: DISCONTINUED | OUTPATIENT
Start: 2022-08-02 | End: 2022-08-03 | Stop reason: HOSPADM

## 2022-08-02 RX ORDER — FENTANYL CITRATE 50 UG/ML
INJECTION, SOLUTION INTRAMUSCULAR; INTRAVENOUS PRN
Status: DISCONTINUED | OUTPATIENT
Start: 2022-08-02 | End: 2022-08-02

## 2022-08-02 RX ORDER — CEFTRIAXONE 2 G/1
2 INJECTION, POWDER, FOR SOLUTION INTRAMUSCULAR; INTRAVENOUS EVERY 24 HOURS
Status: DISCONTINUED | OUTPATIENT
Start: 2022-08-02 | End: 2022-08-02 | Stop reason: HOSPADM

## 2022-08-02 RX ORDER — BISACODYL 10 MG
10 SUPPOSITORY, RECTAL RECTAL DAILY PRN
Status: DISCONTINUED | OUTPATIENT
Start: 2022-08-02 | End: 2022-08-03 | Stop reason: HOSPADM

## 2022-08-02 RX ORDER — LIDOCAINE HYDROCHLORIDE 20 MG/ML
INJECTION, SOLUTION INFILTRATION; PERINEURAL PRN
Status: DISCONTINUED | OUTPATIENT
Start: 2022-08-02 | End: 2022-08-02

## 2022-08-02 RX ORDER — ACETAMINOPHEN 325 MG/1
975 TABLET ORAL EVERY 8 HOURS
Status: DISCONTINUED | OUTPATIENT
Start: 2022-08-02 | End: 2022-08-03 | Stop reason: HOSPADM

## 2022-08-02 RX ORDER — GLYCOPYRROLATE 0.2 MG/ML
INJECTION, SOLUTION INTRAMUSCULAR; INTRAVENOUS PRN
Status: DISCONTINUED | OUTPATIENT
Start: 2022-08-02 | End: 2022-08-02

## 2022-08-02 RX ORDER — FENTANYL CITRATE 50 UG/ML
25 INJECTION, SOLUTION INTRAMUSCULAR; INTRAVENOUS EVERY 5 MIN PRN
Status: DISCONTINUED | OUTPATIENT
Start: 2022-08-02 | End: 2022-08-02 | Stop reason: HOSPADM

## 2022-08-02 RX ORDER — NALOXONE HYDROCHLORIDE 0.4 MG/ML
0.4 INJECTION, SOLUTION INTRAMUSCULAR; INTRAVENOUS; SUBCUTANEOUS
Status: DISCONTINUED | OUTPATIENT
Start: 2022-08-02 | End: 2022-08-03 | Stop reason: HOSPADM

## 2022-08-02 RX ORDER — LABETALOL 20 MG/4 ML (5 MG/ML) INTRAVENOUS SYRINGE
PRN
Status: DISCONTINUED | OUTPATIENT
Start: 2022-08-02 | End: 2022-08-02

## 2022-08-02 RX ORDER — ONDANSETRON 2 MG/ML
INJECTION INTRAMUSCULAR; INTRAVENOUS PRN
Status: DISCONTINUED | OUTPATIENT
Start: 2022-08-02 | End: 2022-08-02

## 2022-08-02 RX ORDER — LIDOCAINE HYDROCHLORIDE AND EPINEPHRINE 10; 10 MG/ML; UG/ML
INJECTION, SOLUTION INFILTRATION; PERINEURAL PRN
Status: DISCONTINUED | OUTPATIENT
Start: 2022-08-02 | End: 2022-08-02 | Stop reason: HOSPADM

## 2022-08-02 RX ORDER — ALBUTEROL SULFATE 90 UG/1
2 AEROSOL, METERED RESPIRATORY (INHALATION) EVERY 4 HOURS PRN
Status: DISCONTINUED | OUTPATIENT
Start: 2022-08-02 | End: 2022-08-03 | Stop reason: HOSPADM

## 2022-08-02 RX ORDER — ONDANSETRON 4 MG/1
4 TABLET, ORALLY DISINTEGRATING ORAL EVERY 6 HOURS PRN
Status: DISCONTINUED | OUTPATIENT
Start: 2022-08-02 | End: 2022-08-02

## 2022-08-02 RX ORDER — PROCHLORPERAZINE MALEATE 5 MG
5 TABLET ORAL EVERY 6 HOURS PRN
Status: DISCONTINUED | OUTPATIENT
Start: 2022-08-02 | End: 2022-08-03 | Stop reason: HOSPADM

## 2022-08-02 RX ORDER — CETIRIZINE HYDROCHLORIDE 10 MG/1
10 TABLET ORAL DAILY
Status: DISCONTINUED | OUTPATIENT
Start: 2022-08-02 | End: 2022-08-03 | Stop reason: HOSPADM

## 2022-08-02 RX ORDER — OXYMETAZOLINE HYDROCHLORIDE 0.05 G/100ML
2 SPRAY NASAL CONTINUOUS PRN
Status: DISCONTINUED | OUTPATIENT
Start: 2022-08-02 | End: 2022-08-03 | Stop reason: HOSPADM

## 2022-08-02 RX ORDER — OXYCODONE HYDROCHLORIDE 10 MG/1
10 TABLET ORAL EVERY 4 HOURS PRN
Status: DISCONTINUED | OUTPATIENT
Start: 2022-08-02 | End: 2022-08-03 | Stop reason: HOSPADM

## 2022-08-02 RX ORDER — SODIUM CHLORIDE, SODIUM LACTATE, POTASSIUM CHLORIDE, CALCIUM CHLORIDE 600; 310; 30; 20 MG/100ML; MG/100ML; MG/100ML; MG/100ML
INJECTION, SOLUTION INTRAVENOUS CONTINUOUS PRN
Status: DISCONTINUED | OUTPATIENT
Start: 2022-08-02 | End: 2022-08-02

## 2022-08-02 RX ORDER — ONDANSETRON 4 MG/1
4 TABLET, ORALLY DISINTEGRATING ORAL EVERY 30 MIN PRN
Status: DISCONTINUED | OUTPATIENT
Start: 2022-08-02 | End: 2022-08-02 | Stop reason: HOSPADM

## 2022-08-02 RX ORDER — DEXAMETHASONE SODIUM PHOSPHATE 10 MG/ML
10 INJECTION, SOLUTION INTRAMUSCULAR; INTRAVENOUS ONCE
Status: COMPLETED | OUTPATIENT
Start: 2022-08-02 | End: 2022-08-02

## 2022-08-02 RX ORDER — SODIUM CHLORIDE, SODIUM LACTATE, POTASSIUM CHLORIDE, CALCIUM CHLORIDE 600; 310; 30; 20 MG/100ML; MG/100ML; MG/100ML; MG/100ML
INJECTION, SOLUTION INTRAVENOUS CONTINUOUS
Status: DISCONTINUED | OUTPATIENT
Start: 2022-08-02 | End: 2022-08-02 | Stop reason: HOSPADM

## 2022-08-02 RX ORDER — ONDANSETRON 4 MG/1
4 TABLET, ORALLY DISINTEGRATING ORAL EVERY 6 HOURS
Status: DISCONTINUED | OUTPATIENT
Start: 2022-08-02 | End: 2022-08-03 | Stop reason: HOSPADM

## 2022-08-02 RX ORDER — ACETAMINOPHEN 325 MG/1
650 TABLET ORAL EVERY 4 HOURS PRN
Status: DISCONTINUED | OUTPATIENT
Start: 2022-08-05 | End: 2022-08-03 | Stop reason: HOSPADM

## 2022-08-02 RX ORDER — ONDANSETRON 2 MG/ML
4 INJECTION INTRAMUSCULAR; INTRAVENOUS EVERY 30 MIN PRN
Status: DISCONTINUED | OUTPATIENT
Start: 2022-08-02 | End: 2022-08-02 | Stop reason: HOSPADM

## 2022-08-02 RX ORDER — HYDROMORPHONE HYDROCHLORIDE 1 MG/ML
0.2 INJECTION, SOLUTION INTRAMUSCULAR; INTRAVENOUS; SUBCUTANEOUS EVERY 5 MIN PRN
Status: DISCONTINUED | OUTPATIENT
Start: 2022-08-02 | End: 2022-08-02 | Stop reason: HOSPADM

## 2022-08-02 RX ORDER — ONDANSETRON 2 MG/ML
4 INJECTION INTRAMUSCULAR; INTRAVENOUS EVERY 6 HOURS PRN
Status: DISCONTINUED | OUTPATIENT
Start: 2022-08-02 | End: 2022-08-03 | Stop reason: HOSPADM

## 2022-08-02 RX ADMIN — CEFTRIAXONE SODIUM 2 G: 2 INJECTION, POWDER, FOR SOLUTION INTRAMUSCULAR; INTRAVENOUS at 09:00

## 2022-08-02 RX ADMIN — ACETAMINOPHEN 975 MG: 325 TABLET, FILM COATED ORAL at 15:18

## 2022-08-02 RX ADMIN — SENNOSIDES AND DOCUSATE SODIUM 1 TABLET: 8.6; 5 TABLET ORAL at 20:16

## 2022-08-02 RX ADMIN — LABETALOL 20 MG/4 ML (5 MG/ML) INTRAVENOUS SYRINGE 10 MG: at 09:37

## 2022-08-02 RX ADMIN — GLYCOPYRROLATE 0.2 MG: 0.2 INJECTION, SOLUTION INTRAMUSCULAR; INTRAVENOUS at 09:39

## 2022-08-02 RX ADMIN — FENTANYL CITRATE 75 MCG: 50 INJECTION, SOLUTION INTRAMUSCULAR; INTRAVENOUS at 09:29

## 2022-08-02 RX ADMIN — LABETALOL 20 MG/4 ML (5 MG/ML) INTRAVENOUS SYRINGE 5 MG: at 10:21

## 2022-08-02 RX ADMIN — BECLOMETHASONE DIPROPIONATE HFA 1 PUFF: 80 AEROSOL, METERED RESPIRATORY (INHALATION) at 20:16

## 2022-08-02 RX ADMIN — HYDROMORPHONE HYDROCHLORIDE 0.2 MG: 1 INJECTION, SOLUTION INTRAMUSCULAR; INTRAVENOUS; SUBCUTANEOUS at 13:01

## 2022-08-02 RX ADMIN — ROCURONIUM BROMIDE 10 MG: 50 INJECTION, SOLUTION INTRAVENOUS at 09:27

## 2022-08-02 RX ADMIN — LABETALOL 20 MG/4 ML (5 MG/ML) INTRAVENOUS SYRINGE 5 MG: at 09:45

## 2022-08-02 RX ADMIN — ONDANSETRON 4 MG: 4 TABLET, ORALLY DISINTEGRATING ORAL at 17:38

## 2022-08-02 RX ADMIN — PHENYLEPHRINE HYDROCHLORIDE 50 MCG: 10 INJECTION INTRAVENOUS at 11:13

## 2022-08-02 RX ADMIN — LABETALOL 20 MG/4 ML (5 MG/ML) INTRAVENOUS SYRINGE 5 MG: at 11:22

## 2022-08-02 RX ADMIN — ONDANSETRON 4 MG: 4 TABLET, ORALLY DISINTEGRATING ORAL at 22:07

## 2022-08-02 RX ADMIN — ACETAMINOPHEN 975 MG: 325 TABLET, FILM COATED ORAL at 22:07

## 2022-08-02 RX ADMIN — PROPOFOL 140 MG: 10 INJECTION, EMULSION INTRAVENOUS at 08:44

## 2022-08-02 RX ADMIN — HYDROMORPHONE HYDROCHLORIDE 0.2 MG: 1 INJECTION, SOLUTION INTRAMUSCULAR; INTRAVENOUS; SUBCUTANEOUS at 13:10

## 2022-08-02 RX ADMIN — SUGAMMADEX 200 MG: 100 INJECTION, SOLUTION INTRAVENOUS at 12:08

## 2022-08-02 RX ADMIN — FENTANYL CITRATE 25 MCG: 50 INJECTION, SOLUTION INTRAMUSCULAR; INTRAVENOUS at 12:55

## 2022-08-02 RX ADMIN — FENTANYL CITRATE 25 MCG: 50 INJECTION, SOLUTION INTRAMUSCULAR; INTRAVENOUS at 12:48

## 2022-08-02 RX ADMIN — ROCURONIUM BROMIDE 5 MG: 50 INJECTION, SOLUTION INTRAVENOUS at 08:42

## 2022-08-02 RX ADMIN — ALBUTEROL SULFATE 2 PUFF: 90 AEROSOL, METERED RESPIRATORY (INHALATION) at 17:38

## 2022-08-02 RX ADMIN — ROCURONIUM BROMIDE 10 MG: 50 INJECTION, SOLUTION INTRAVENOUS at 10:57

## 2022-08-02 RX ADMIN — FENTANYL CITRATE 125 MCG: 50 INJECTION, SOLUTION INTRAMUSCULAR; INTRAVENOUS at 08:42

## 2022-08-02 RX ADMIN — PHENYLEPHRINE HYDROCHLORIDE 50 MCG: 10 INJECTION INTRAVENOUS at 11:06

## 2022-08-02 RX ADMIN — ROCURONIUM BROMIDE 55 MG: 50 INJECTION, SOLUTION INTRAVENOUS at 08:44

## 2022-08-02 RX ADMIN — PHENYLEPHRINE HYDROCHLORIDE 50 MCG: 10 INJECTION INTRAVENOUS at 10:35

## 2022-08-02 RX ADMIN — ONDANSETRON 4 MG: 2 INJECTION INTRAMUSCULAR; INTRAVENOUS at 11:46

## 2022-08-02 RX ADMIN — OXYCODONE HYDROCHLORIDE 10 MG: 10 TABLET ORAL at 13:28

## 2022-08-02 RX ADMIN — SODIUM CHLORIDE, POTASSIUM CHLORIDE, SODIUM LACTATE AND CALCIUM CHLORIDE: 600; 310; 30; 20 INJECTION, SOLUTION INTRAVENOUS at 13:15

## 2022-08-02 RX ADMIN — SODIUM CHLORIDE, POTASSIUM CHLORIDE, SODIUM LACTATE AND CALCIUM CHLORIDE: 600; 310; 30; 20 INJECTION, SOLUTION INTRAVENOUS at 08:38

## 2022-08-02 RX ADMIN — PROPOFOL 60 MG: 10 INJECTION, EMULSION INTRAVENOUS at 08:48

## 2022-08-02 RX ADMIN — LIDOCAINE HYDROCHLORIDE 100 MG: 20 INJECTION, SOLUTION INFILTRATION; PERINEURAL at 08:42

## 2022-08-02 RX ADMIN — DEXAMETHASONE SODIUM PHOSPHATE 10 MG: 10 INJECTION, SOLUTION INTRAMUSCULAR; INTRAVENOUS at 09:16

## 2022-08-02 RX ADMIN — ROCURONIUM BROMIDE 40 MG: 50 INJECTION, SOLUTION INTRAVENOUS at 08:48

## 2022-08-02 ASSESSMENT — ACTIVITIES OF DAILY LIVING (ADL)
ADLS_ACUITY_SCORE: 18
ADLS_ACUITY_SCORE: 19
ADLS_ACUITY_SCORE: 18

## 2022-08-02 ASSESSMENT — VISUAL ACUITY
OU: NORMAL ACUITY
OU: BLURRED VISION

## 2022-08-02 NOTE — ANESTHESIA POSTPROCEDURE EVALUATION
Patient: Jayme Selby    Procedure: Procedure(s):  endoscopic image-guided resection of encephalocele and repair of Cerebrospinal Fluid (CSF) Leak, nasoseptal flap       Anesthesia Type:  General    Note:  Disposition: Inpatient   Postop Pain Control: Uneventful            Sign Out: Well controlled pain   PONV: No   Neuro/Psych: Uneventful            Sign Out: Acceptable/Baseline neuro status   Airway/Respiratory: Uneventful            Sign Out: Acceptable/Baseline resp. status   CV/Hemodynamics: Uneventful            Sign Out: Acceptable CV status; No obvious hypovolemia; No obvious fluid overload   Other NRE: NONE   DID A NON-ROUTINE EVENT OCCUR? No           Last vitals:  Vitals Value Taken Time   /95 08/02/22 1330   Temp 36.7  C (98.1  F) 08/02/22 1217   Pulse 72 08/02/22 1334   Resp 0 08/02/22 1334   SpO2 95 % 08/02/22 1334   Vitals shown include unvalidated device data.    Electronically Signed By: Basia Fonseca MD  August 2, 2022  1:35 PM

## 2022-08-02 NOTE — DISCHARGE SUMMARY
Discharge Summary  Jayme Selby  [id number]  1955    Date of Admission: 8/2/2022  Date of Discharge: 8/3/2022    Admission Diagnosis: CSF rhinorrhea [G96.01]  Encephalocele (H) [Q01.9]  Chronic pansinusitis [J32.4]  Morbid obesity (H) [E66.01]  Encounter for postoperative care [Z48.89]  Discharge Diagnosis: Same    Procedures:  Date: 8/2/2022  Procedure(s):  endoscopic image-guided resection of encephalocele and repair of Cerebrospinal Fluid (CSF) Leak, nasoseptal flap      HPI: Jayme Selby is a 67 year old male with a history of multiple nasal traumas who was referred to Dr. Patel's clinic on 5/20/22 for consultation concerning a CSF leak. Patient did have a bilateral ethmoidectomies, maxillary antrostomies, frontal sinusotomies, and reduction of inferior turbinates in 2014 with Dr. Kai Sandoval. After surgery, patient was lost to follow up and eventually presented to the ED this past year with sinusitis concerns treated with amoxicillin (3/2022). Patient was subsequently referred to Dr. Sahni who noted clear rhinorrhea from L nasal cavity with further CT imaging concerning for possible skull base dehiscence with CSF leak. Beta 2 transferrin test ordered by Dr. Sahni was indeed positive prior to the clinic visit with Dr. Patel. At that visit with Dr. Patel, patient reported L aural fullness when clenching his teeth along with intermittent salty-fluid taste in his mouth that wakes him up during sleep. Denied hyposmia at that time as well.     In clinic, imaging studies were reviewed, and in combination with the positive beta 2 transferrin study and nasal endoscopy revealing clear fluid present in the MM draining back to the nasopharynx, surgery for repair of CSF leak was discussed at length with the patient. It was recommended that he undergo operative intervention and the patient consented to the above procedure after detailed explanation of the risks and benefits of said procedure.    Hospital Course:  The patient was admitted to the hospital and underwent the above mentioned procedure. He tolerated the procedure without any intra- or agustina-operative complications. Please see the operative report for full details of the procedure. The patient was admitted for post-operative monitoring. His postoperative course was uneventful. His pain was well managed with tylenol and PRN oxycodone. Maintenance fluids were provided post-operatively as well as a bowel regimen. Zofran was scheduled post-op. Patient was started on ocean sprays q4h on POD1 which was tolerated well. At discharge, the patient's pain was well controlled, the patient was voiding on his own, and was ambulating and tolerating a regular diet.     Discharge Exam:  Vitals:    08/02/22 1900 08/02/22 2300 08/03/22 0408 08/03/22 0805   BP: 122/81 132/75 125/68 (!) 153/73   BP Location: Right arm Right arm Right arm Left arm   Pulse: 92 84 69 79   Resp: 18 16 16 16   Temp: 97.8  F (36.6  C) 97.3  F (36.3  C) 98.9  F (37.2  C) 98.7  F (37.1  C)   TempSrc: Oral Oral Oral Oral   SpO2: 96% 98% 98% 97%   Weight:       Height:            General: Alert and oriented x 3, No acute distress                 HEENT: EOMI. HB 1/6.  Nasal sling in place. No drainage from anterior nose, no drainage in oropharynx.                  Pulmonary: Breathing non-labored, no stridor, no accessory muscle use.    Discharge Medications:     Medication List      Started    acetaminophen 325 MG tablet  Commonly known as: TYLENOL  650 mg, Oral, EVERY 6 HOURS PRN     ondansetron 4 MG ODT tab  Commonly known as: ZOFRAN ODT  4 mg, Oral, EVERY 6 HOURS PRN     oxyCODONE 5 MG tablet  Commonly known as: ROXICODONE  5 mg, Oral, EVERY 4 HOURS PRN     oxymetazoline 0.05 % nasal spray  Commonly known as: AFRIN  2 sprays, Both Nostrils, PRN     polyethylene glycol 17 GM/Dose powder  Commonly known as: MIRALAX  17 g, Oral, DAILY     senna-docusate 8.6-50 MG tablet  Commonly known as: SENOKOT-S/PERICOLACE  1  "tablet, Oral, 2 TIMES DAILY     sodium chloride 0.65 % nasal spray  Commonly known as: OCEAN  2 sprays, Both Nostrils, EVERY 4 HOURS            Discharge Procedure Orders   Reason for your hospital stay   Order Comments: Post op care     Activity   Order Comments: No heavy lifting greater than 10 lbs and no strenuous exercise for 2 weeks or until follow up appointment. No driving while taking narcotic pain medications.     SINUS PRECAUTIONS: No straining, sneeze with mouth open, no sucking on straws. You have plastic splints sutured in your nose, these will be removed when you are seen at follow up.     Order Specific Question Answer Comments   Is discharge order? Yes      When to contact your care team   Order Comments: Please notify your doctor if you experience wound breakdown, sustained bleeding from the wound site, or increasing redness, swelling, and/or purulent malorodorous discharge from the wound site which may indicate infection. If you feel it is acute, or experience sudden changes in breathing, chest pain, or excessive sleepiness/somnolence please return to the emergency department or call 911. If you have questions or concerns during the day please call ENT clinic and [phone]. If at night you can call [facility] at [phone] and ask for the \"ENT resident on call\".     Follow Up ([facility])   Order Comments: Follow up as scheduled with Dr. Patel    Appointments on [place] and/or [facility] (with [facility] or [facility] provider or service). Call [phone] if you haven't heard regarding these appointments within 7 days of discharge.     Diet   Order Comments: Regular     Order Specific Question Answer Comments   Is discharge order? Yes        Dispo: To home in good condition. All of the patient's questions/concerns have been addressed at this time.     Celia Renee MD  Otolaryngology Head and Neck Surgery Resident  [facility]    "

## 2022-08-02 NOTE — OP NOTE
Procedure Date: 08/02/2022    PREOPERATIVE DIAGNOSIS:  Left ethmoid roof encephalocele.    PREOPERATIVE DIAGNOSIS:  Left ethmoid roof encephalocele.    PROCEDURES PERFORMED:     1.  Left endoscopic endonasal repair of ethmoid roof CSF leak.  2.  Nenzel of left sided pedicled nasoseptal flap pedicled off the posterior septal branch of the sphenopalatine artery for skull base reconstruction.  3.  Computerized image guidance, extradural.    SURGEON:  Sin Patel M.D.     RESIDENT:  Tika Richardson M.D.     MED STUDENT:  Yobani    INDICATIONS FOR PROCEDURE:  Jayme Selby is a 67-year-old gentleman who came to see me with signs and symptoms, as well as radiographic imaging consistent with a left sided encephalocele and CSF leak.  He was a candidate for the surgery listed above.  We discussed the risks, benefits and alternatives to surgery at length including intracranial bleeding as well as failure of reconstruction and need for further revision surgery.  He was allowed to ask a number of insightful questions, which I did answer to the best of my ability and after this discussion, written informed consent was obtained and he was eager to proceed with surgery.    DESCRIPTION OF PROCEDURE IN DETAIL:  The patient was identified in the preoperative holding area where consent was confirmed.  He was subsequently brought back to the operating room where general anesthesia was induced and he was intubated without issue.  The eyes were protected, a timeout was performed.  All were in agreement.  He was subsequently turned 180 degrees and prepped and draped in standard fashion in preparation for endoscopic endonasal surgery.  We performed a contour-based image guidance registration using an image guidance registration platform.  Several landmarks across the facial skeleton were used to confirm good target registration with minimal error.  This was used throughout the case to confirm important landmarks including the skull  base, the orbit, as well as the limits of our skull base defect.  We also used it to perform a frontal recess dissection on the left side.    We started the procedure by decongesting the left nasal cavity with 1:100,000 epinephrine-soaked pledgets.  On the left side, there was a edda bullosa of the left middle turbinate which was resected using a microdebrider.  We then performed a maxillary antrostomy using a pediatric backbiter, straight Thru-Cutting forceps as well as microdebrider.  This was completed with the aid of 30-degree telescopic visualization.  The ethmoid bulla was then removed using angled Thru-Cutting forceps as well as microdebrider.  To better flank this area of the CSF leak, we decided to perform a frontal recess dissection prior to performing a posterior ethmoidectomy.  The uncinate was resected superiorly towards its lateral attachment at the orbit using a Kerrison rongeur.  We identified the frontal recess draining medial to an agger and a super agger nasi cell.  The agger nasi cells were resected using frontal Kerrison rongeur as well as RAD-60 microdebrider.  The high ethmoid bulla was resected in a similar fashion.  We then could visualize posteriorly behind the basal lamella, the CSF site defect and possible encephalocele which was bleeding mucosa out into the ethmoid cavity.  The basal lamella was resected superiorly using angled Thru-Cutting forceps as well as microdebrider.  The posterior ethmoid cavity was opened in a similar fashion by removing posterior ethmoid partitions using angled Thru-Cutting forceps as well as microdebrider.  We did identify just superior to the basal lamellas lateral attachment on the orbit.  We identified the ethmoid roof defect as approximately 0.5 cm in length as well as approximately 2-3 mm in width.  The mucosa over it was stripped.  We encountered CSF.  After this was done, there was a small encephalocele, which was bipolar cauterized and reduced into  the intracranial anterior cranial fossa.  Once this was done, hemostasis was ensured and we identified the limits of our ethmoid roof defect.  Once the defect was delimited, we placed a small amount of Surgicel within the defect.  No CSF was noted to egress around this packing material.  Meticulous hemostasis was again ensured.  We then harvested a left sided nasoseptal flap, which was pedicled off the posterior septal branch of the sphenopalatine artery.    To harvest the left sided nasoseptal flap, which was pedicled off the posterior septal branch of sphenopalatine artery, we first identified the sphenoid ostium, which was just medial to the superior turbinate and was dilated using a Calumet elevator.  We started our superior incision to delimit the nasoseptal flap pedicle at the sphenoid ostium, and needle tip Bovie electrocautery was started just at the sphenoid ostium, was carried anteriorly just over the septal body and inferiorly it was connected just approximately 1 cm to 2 cm posterior to the mucocutaneous junction.  It was carried inferiorly and stopped just superior to the maxillary crest.  It was carried posteriorly towards the choanal arch and was just stopped over laterally just superior to the eustachian tube over the choana.  It was painstakingly raised off the bone and cartilage of the septum using a caudal elevator as well as suction Calumet.  Once it was isolated at the pedicle, it was then inserted in the nasopharynx for later use.  We then resected the middle turbinate superiorly flush with the cribriform plate.  Meticulous hemostasis was ensured.  The lateral attachment of the superior turbinate was removed using straight Thru-Cutting forceps.  We then retrieved the nasoseptal flap from the nasopharynx and rotated it over our skull base defect such that all edges of the flap covered adequately the edges of our defect.  The nasoseptal flap was then lined with Surgicel, then DuraSeal glue was placed  to bolster in place further.  This was followed by Gelfoam and NasoPore to further bolster the reconstruction in place.  I then fashioned Horan splints and sewed them along the septum using a 3-0 nylon suture.  An orogastric tube was placed in the stomach and the contents were suctioned.  The patient was subsequently turned over to Anesthesia for awakening.  He was extubated in uneventful fashion and transferred to the PACU in stable condition.  I did call the wife and updated her regarding the outcome of surgery.    Sin Patel MD        D: 2022   T: 2022   MT: BRYNN    Name:     HARSH LONG  MRN:      [id number]        Account:        [id number]   :      1955           Procedure Date: 2022     Document: [id number]

## 2022-08-02 NOTE — ANESTHESIA PROCEDURE NOTES
Arterial Line Procedure Note    Pre-Procedure   Staff -        Anesthesiologist:  Michel Rodgers MD       Performed By: anesthesiologist       Location: OR       Pre-Anesthestic Checklist: patient identified, IV checked, risks and benefits discussed, informed consent, monitors and equipment checked, pre-op evaluation and at physician/surgeon's request  Timeout:       Correct Patient: Yes        Correct Procedure: Yes        Correct Site: Yes        Correct Position: Yes   Line Placement:   This line was placed Post Induction starting at 8/2/2022 9:06 AM and ending at 8/2/2022 9:09 AM  Procedure   Procedure: arterial line       Diagnosis: Uncontrolled HTN       Laterality: right       Insertion Site: radial.  Sterile Prep        Standard elements of sterile barrier followed       Skin prep: Chloraprep  Insertion/Injection        Technique: Seldinger Technique        Catheter Type/Size: 20 G, 1.75 in/4.5 cm quick cath (integral wire)  Narrative        Tegaderm dressing used.       Complications: None apparent,        Arterial waveform: Yes        IBP within 10% of NIBP: Yes

## 2022-08-02 NOTE — ANESTHESIA PROCEDURE NOTES
Airway       Patient location during procedure: OR       Procedure Start/Stop Times: 8/2/2022 8:48 AM  Staff -        CRNA: Dominique Crespo APRN CRNA       Performed By: CRNA  Consent for Airway        Urgency: elective  Indications and Patient Condition       Indications for airway management: agustina-procedural       Induction type:intravenous       Mask difficulty assessment: 3 - difficult mask (inadequate, unstable, or two providers) +/- NMBA    Final Airway Details       Final airway type: endotracheal airway       Successful airway: ETT - single  Endotracheal Airway Details        ETT size (mm): 7.5       Cuffed: yes       Successful intubation technique: video laryngoscopy       VL Blade Size: MAC 3       Grade View of Cords: 1       Adjucts: stylet       Position: Center       Measured from: gums/teeth       Secured at (cm): 23    Post intubation assessment        Placement verified by: capnometry, equal breath sounds and chest rise        Number of attempts at approach: 1       Secured with: other (comment) (tube tamer)       Ease of procedure: easy       Dentition: Intact    Medication(s) Administered   Medication Administration Time: 8/2/2022 8:48 AM

## 2022-08-02 NOTE — ANESTHESIA CARE TRANSFER NOTE
Patient: Jayme Selby    Procedure: Procedure(s):  endoscopic image-guided resection of encephalocele and repair of Cerebrospinal Fluid (CSF) Leak, nasoseptal flap       Diagnosis: CSF rhinorrhea [G96.01]  Encephalocele (H) [Q01.9]  Chronic pansinusitis [J32.4]  Morbid obesity (H) [E66.01]  Diagnosis Additional Information: No value filed.    Anesthesia Type:   General     Note:    Oropharynx: oropharynx clear of all foreign objects and spontaneously breathing  Level of Consciousness: drowsy  Oxygen Supplementation: face mask  Level of Supplemental Oxygen (L/min / FiO2): 6  Independent Airway: airway patency satisfactory and stable  Dentition: dentition unchanged  Vital Signs Stable: post-procedure vital signs reviewed and stable  Report to RN Given: handoff report given  Patient transferred to: PACU    Handoff Report: Identifed the Patient, Identified the Reponsible Provider, Reviewed the pertinent medical history, Discussed the surgical course, Reviewed Intra-OP anesthesia mangement and issues during anesthesia, Set expectations for post-procedure period and Allowed opportunity for questions and acknowledgement of understanding      Vitals:  Vitals Value Taken Time   /93 08/02/22 1217   Temp     Pulse 75 08/02/22 1222   Resp 17 08/02/22 1222   SpO2 99 % 08/02/22 1222   Vitals shown include unvalidated device data.    Electronically Signed By: STEPAN Gold CRNA  August 2, 2022  12:23 PM

## 2022-08-02 NOTE — PROGRESS NOTES
Neurosurgery Pre-operative Physical Exam:      Assessment:  Jayme Selby is a 67 year old male with a past medical history of nasal trauma and sinus surgery in 2014 at [facility] and ongoing intermittent CSF rhinorrhea    Gen: Appears comfortable, NAD  Neurologic:  Alert & Oriented to person, place, time, and situation  Follows commands briskly  Speech fluent, spontaneous. No aphasia or dysarthria.  No gaze preference. No apparent hemineglect.  PERRL, EOMI, Visual acuity (R: 20/50, L: 20/30)  Face symmetric with sensation intact to light touch  Palate elevates symmetrically, uvula midline, tongue protrudes midline  Trapezii muscles 5/5 bilaterally  No pronator drift     Del Tr Bi WE WF Gr   R 5 5 5 5 5 5   L 5 5 5 5 5 5    HF KE KF DF PF EHL   R 5 5 5 5 5 5   L 5 5 5 5 5 5     Reflexes 2+ throughout    Sensation intact and symmetric to light touch throughout    Objective:   Temp:  [98.6  F (37  C)] 98.6  F (37  C)  Pulse:  [84] 84  Resp:  [18] 18  BP: (167)/(103) 167/103  SpO2:  [96 %] 96 %  No intake/output data recorded.    Thompson Garcia MD, PhD  PGY-2 Neurosurgery  Pager: [phone]

## 2022-08-02 NOTE — PLAN OF CARE
Status: Pt with left ethmoid roof encephalocele. Now s/p endoscopic resection of encephalocele and repair of CSF leak, nasoseptal flap 08/02. On nasal precautions  Vitals: stable on RA  Neuros: A&O x4. Denies N/T. Denies salty or metallic taste  IV: PIV x2, both saline locked  Labs/Electrolytes: WDL  Resp/trach: LS clear. PRN albuterol requested from pharmacy  Diet: Regular, eating & drinking well postop  Bowel status: Last BM 08/01, BS+  : VDSP  Skin: Scratch to R shin from his cat. Nasal packing with splints  Pain: Headache managed with scheduled tylenol this evening  Activity: SB, and GB  Social: Wife Elsy updated by team per PACU report  Plan: Continue nasal precautions. Per pt, he might be discharging tomorrow. Cont with current POC        Arrived from: PACU  Belongings/meds: clothing, cell phone, and sandal. Vitamin bottle and inhaler in his bag stored in room closet, pt refused to have it stored with hospital security, no family present to sent it home with today)  2 RN Skin Assessment Completed by: DANA Alfredo and DANA Ward  Non-intact findings documented (yes/no/NA): Abrasion to R shin (pt said it is scratch from his cat)

## 2022-08-02 NOTE — OR NURSING
Anesthesia notified:  Pt drinking black coffee from around 7497-6111, about 300cc until arrival in pre-op.  Allergy contraindication alert with dexamethasone.  Elevated Pre-op BP.   unknown

## 2022-08-02 NOTE — ANESTHESIA PREPROCEDURE EVALUATION
Anesthesia Pre-Procedure Evaluation    Patient: Jayme Selby   MRN: [id number] : 1955        Procedure : Procedure(s):  endoscopic image-guided resection of encephalocele and repair of Cerebrospinal Fluid (CSF) Leak, nasoseptal flap  possible fascia ludwin graft, fat graft  possible INSERTION, DRAIN, SPINE, LUMBAR          Past Medical History:   Diagnosis Date     Allergic rhinitis due to animal dander     14 IgE tests pos. only for cat/dog/T (all other environmental allergens NEGATIVE)     Asthma      CSF rhinorrhea      Diagnostic skin and sensitization tests (aka ALLERGENS) 2014     GERD (gastroesophageal reflux disease)      H. pylori infection      HLD (hyperlipidemia)      HTN (hypertension)      Obesity      Seasonal allergic rhinitis      Tobacco use       Past Surgical History:   Procedure Laterality Date     CHOLECYSTECTOMY  1999     ETHMOIDECTOMY  2014    Procedure: ETHMOIDECTOMY;  Surgeon: Kai Sandoval MD;  Location: [facility] OR     ETHMOIDECTOMY  2014    Procedure: ETHMOIDECTOMY;  Surgeon: Kai Sandoval MD;  Location: [facility] OR     TONSILLECTOMY       TYMPANOTOMY EXPLORATORY Left 2015    Procedure: TYMPANOTOMY EXPLORATORY;  Surgeon: Michel He MD;  Location: [facility] OR      Allergies   Allergen Reactions     Advair Diskus Other (See Comments)     Adverse reaction per previous medical records.  Vision changes     Amlodipine Cough     Flu Like Symptoms       Lisinopril Cough     Per patient.     Losartan Cough     Flu like symptoms       Chlorthalidone Other (See Comments) and Rash     Lesions on Face, Chest Pressure        Social History     Tobacco Use     Smoking status: Current Every Day Smoker     Packs/day: 0.50     Years: 40.00     Pack years: 20.00     Types: Cigarettes     Smokeless tobacco: Never Used     Tobacco comment: smokes natural cigerettes not in box   Substance Use Topics     Alcohol use: Not Currently      Wt Readings from Last 1  Encounters:   08/02/22 115.3 kg (254 lb 3.1 oz)        Anesthesia Evaluation            ROS/MED HX  ENT/Pulmonary:     (+) asthma     Neurologic:       Cardiovascular:     (+) hypertension-----    METS/Exercise Tolerance:     Hematologic:       Musculoskeletal:       GI/Hepatic:     (+) GERD, Asymptomatic on medication,     Renal/Genitourinary:       Endo:     (+) Obesity,     Psychiatric/Substance Use:       Infectious Disease:       Malignancy:       Other:               OUTSIDE LABS:  CBC:   Lab Results   Component Value Date    WBC 9.5 07/19/2022    WBC 13.3 (H) 05/23/2020    HGB 15.7 07/19/2022    HGB 15.8 05/23/2020    HCT 47.2 07/19/2022    HCT 46.2 05/23/2020     07/19/2022     05/23/2020     BMP:   Lab Results   Component Value Date     07/19/2022     05/23/2020    POTASSIUM 4.2 07/19/2022    POTASSIUM 3.8 05/23/2020    CHLORIDE 104 07/19/2022    CHLORIDE 107 05/23/2020    CO2 25 07/19/2022    CO2 25 05/23/2020    BUN 12 07/19/2022    BUN 11 05/23/2020    CR 1.08 07/19/2022    CR 1.03 05/23/2020     (H) 08/02/2022    GLC 99 07/19/2022     COAGS:   Lab Results   Component Value Date    PTT 43 (H) 05/08/2014    INR 1.00 05/07/2014     POC: No results found for: BGM, HCG, HCGS  HEPATIC:   Lab Results   Component Value Date    ALBUMIN 3.7 04/08/2019    PROTTOTAL 7.3 04/08/2019    ALT 33 04/08/2019    AST 20 04/08/2019    ALKPHOS 92 04/08/2019    BILITOTAL 0.4 04/08/2019     OTHER:   Lab Results   Component Value Date    LACT 1.0 04/08/2019    DEONDRE 9.1 07/19/2022    LIPASE 106 04/08/2019    TSH 1.16 04/19/2019    CRP 7.8 04/08/2019       Anesthesia Plan    ASA Status:  3      Anesthesia Type: General.     - Airway: ETT   Induction: Intravenous.   Maintenance: Balanced.   Techniques and Equipment:     - Lines/Monitors: 2nd IV, Arterial Line     Consents    Anesthesia Plan(s) and associated risks, benefits, and realistic alternatives discussed. Questions answered and  patient/representative(s) expressed understanding.    - Discussed:     - Discussed with:  Patient      - Extended Intubation/Ventilatory Support Discussed: No.      - Patient is DNR/DNI Status: No    Use of blood products discussed: No .     Postoperative Care       PONV prophylaxis: Ondansetron (or other 5HT-3)     Comments:                Ramos Ibrahim MD

## 2022-08-02 NOTE — BRIEF OP NOTE
[facility]    Brief Operative Note    Pre-operative diagnosis: CSF rhinorrhea [G96.01]  Encephalocele (H) [Q01.9]  Chronic pansinusitis [J32.4]  Morbid obesity (H) [E66.01]  Post-operative diagnosis Same as pre-operative diagnosis    Procedure: Procedure(s):  endoscopic image-guided resection of encephalocele and repair of Cerebrospinal Fluid (CSF) Leak, nasoseptal flap  Surgeon: Surgeon(s) and Role:     * Sin Patel MD - Primary     * Tika Richardson MD - Resident - Assisting  Anesthesia: General   Estimated Blood Loss: 30    Drains: None  Specimens: * No specimens in log *  Findings:   Defect visualized at the lateral lamellar insertion, not very large. The encephalocele compressed nicely. Raised nasoseptal flap on the left, which was used in recon. Left maxillary antrostomy, total ethmoidectomy, frontal sinusotomy performed  .  Complications: None.  Implants: * No implants in log *

## 2022-08-03 VITALS
RESPIRATION RATE: 16 BRPM | TEMPERATURE: 98.7 F | HEIGHT: 74 IN | OXYGEN SATURATION: 97 % | WEIGHT: 254.19 LBS | DIASTOLIC BLOOD PRESSURE: 73 MMHG | HEART RATE: 79 BPM | BODY MASS INDEX: 32.62 KG/M2 | SYSTOLIC BLOOD PRESSURE: 153 MMHG

## 2022-08-03 PROCEDURE — 250N000013 HC RX MED GY IP 250 OP 250 PS 637: Performed by: STUDENT IN AN ORGANIZED HEALTH CARE EDUCATION/TRAINING PROGRAM

## 2022-08-03 RX ORDER — OXYMETAZOLINE HYDROCHLORIDE 0.05 G/100ML
2 SPRAY NASAL PRN
Qty: 30 ML | Refills: 0 | Status: SHIPPED | OUTPATIENT
Start: 2022-08-03

## 2022-08-03 RX ORDER — POLYETHYLENE GLYCOL 3350 17 G/17G
17 POWDER, FOR SOLUTION ORAL DAILY
Qty: 510 G | Refills: 0 | Status: SHIPPED | OUTPATIENT
Start: 2022-08-03

## 2022-08-03 RX ORDER — AMOXICILLIN 250 MG
1 CAPSULE ORAL 2 TIMES DAILY
Qty: 30 TABLET | Refills: 0 | Status: SHIPPED | OUTPATIENT
Start: 2022-08-03 | End: 2022-08-18

## 2022-08-03 RX ORDER — OXYCODONE HYDROCHLORIDE 5 MG/1
5 TABLET ORAL EVERY 4 HOURS PRN
Qty: 20 TABLET | Refills: 0 | Status: SHIPPED | OUTPATIENT
Start: 2022-08-03 | End: 2023-05-10

## 2022-08-03 RX ORDER — ACETAMINOPHEN 325 MG/1
650 TABLET ORAL EVERY 6 HOURS PRN
Qty: 30 TABLET | Refills: 0 | Status: SHIPPED | OUTPATIENT
Start: 2022-08-03

## 2022-08-03 RX ORDER — ONDANSETRON 4 MG/1
4 TABLET, ORALLY DISINTEGRATING ORAL EVERY 6 HOURS PRN
Qty: 20 TABLET | Refills: 0 | Status: SHIPPED | OUTPATIENT
Start: 2022-08-03 | End: 2023-05-10

## 2022-08-03 RX ADMIN — ACETAMINOPHEN 975 MG: 325 TABLET, FILM COATED ORAL at 04:56

## 2022-08-03 RX ADMIN — CETIRIZINE HYDROCHLORIDE 10 MG: 10 TABLET, FILM COATED ORAL at 08:01

## 2022-08-03 ASSESSMENT — ACTIVITIES OF DAILY LIVING (ADL)
ADLS_ACUITY_SCORE: 19

## 2022-08-03 NOTE — OP NOTE
[facility]  Department of Neurosurgery  Operative Report    PROCEDURE DATE: 8/2/2022       PREOPERATIVE DIAGNOSIS:  Sinonasal encephalocele       SURGEON: Ramos Pride MD    PROCEDURES:  1. Endoscopic endonasal approach for encephalocele by ENT (Dr. Patel)  2. Operative standby for 4 hours     INDICATIONS FOR PROCEDURE:    Patient is a 67 year old male who presented with a left sided encephalocele and spontaneous CSF leak. Beta2 transferrin testing was positive. He was evaluated by Dr. Patel, our ENT skull base colleague and Neurosurgery was planned to be on operative standby for this case in the event assistance for resection and/or reconstruction was required based on the involvement of the skull base, as well as possible fascia graft or lumbar drain placement. I discussed this with the patient, who understood and agreed. After discussion of the risks, benefits, and alternatives, informed consent was obtained.    DESCRIPTION OF PROCEDURE:       The patient was brought to the operating room where general endotracheal anesthesia was induced and access were obtained. Patient was positioned for surgery by Dr. Patel s team.     Dr. Patel carried out the endonasal procedure and please see his note for details.    My team remained on standby for a total of 4 hours during this portion of the procedure where assistance in resection of the lesion and/or approach or reconstruction to the anterior skull base/orbit would be required. During this time, no other patient care was being provided.  Given the findings at surgery, our services were not required.    Dr. Patel then proceeded with closure. Please see Dr. Patel s operative report for details.     Ramos Pride MD

## 2022-08-03 NOTE — PROGRESS NOTES
"Otolaryngology Progress Note  August 3, 2022    SUBJECTIVE: No acute events overnight. No neuro deficits. No evidence of leak.     OBJECTIVE:   /68 (BP Location: Right arm)   Pulse 69   Temp 98.9  F (37.2  C) (Oral)   Resp 16   Ht 1.88 m (6' 2\")   Wt 115.3 kg (254 lb 3.1 oz)   SpO2 98%   BMI 32.64 kg/m     General: Alert and oriented x 3, No acute distress   HEENT: EOMI. HB 1/6.  Nasal sling in place. No drainage from anterior nose, no drainage in oropharynx.    Pulmonary: Breathing non-labored, no stridor, no accessory muscle use.    LABS:  ROUTINE IP LABS (Last four results)  BMP  Recent Labs   Lab 08/02/22  0646   *     CBCNo lab results found in last 7 days.  INRNo lab results found in last 7 days.    ASSESSMENT & PLAN: Jayme Selby is a 67 year old male with a history of multiple nasal traumas with more recent CSF rhinorrhea now s/p endoscopic resection of encephalocele and repair of Cerebrospinal Fluid (CSF) Leak with nasoseptal flap. Recovering well post-operatively.     - Continue tylenol and oxycodone PRN  - Start Nasal saline spray today  - Continue bowel regimen at home  - plan to discharge today    -- Patient and above plan discussed with Dr. Patel  "

## 2022-08-03 NOTE — PLAN OF CARE
Status: POD#1 for Left endoscopic endonasal repair for sinonasal encephalocele and spontaneous CSF leak.   Vitals: VSS  Neuros: A/Ox4, intact. Sinus precautions, Nasal packing to L nostril   IV: PIV SL   Resp/trach: RA  Diet: Regular   Bowel status: LBM PTA  : Voiding spontaneously   Skin: No new deficits  Pain: only reporting mild pain managed with scheduled tylenol   Activity: HOB >30, up independently   Plan: Potential discharge today

## 2022-08-03 NOTE — PLAN OF CARE
Status: POD#1 for Left endoscopic endonasal repair for sinonasal encephalocele and spontaneous CSF leak.   Vitals: VSS  Neuros: A/Ox4, intact. Sinus precautions, Nasal packing to L nostril   IV: PIV SL removed  Resp/trach: RA  Diet: Regular   Bowel status: LBM PTA  : Voiding spontaneously   Skin: No new deficits  Pain: only reporting mild pain managed with scheduled tylenol   Activity: HOB >30, up independently   Plan: home today. Left 6A via w/c with aid to  rx and then meet wife in lobby. Pt reports understanding POC and states has all belongings

## 2022-08-19 ENCOUNTER — OFFICE VISIT (OUTPATIENT)
Dept: OTOLARYNGOLOGY | Facility: CLINIC | Age: 67
End: 2022-08-19
Payer: MEDICARE

## 2022-08-19 VITALS
WEIGHT: 262 LBS | DIASTOLIC BLOOD PRESSURE: 117 MMHG | HEART RATE: 84 BPM | BODY MASS INDEX: 33.62 KG/M2 | SYSTOLIC BLOOD PRESSURE: 167 MMHG | HEIGHT: 74 IN

## 2022-08-19 DIAGNOSIS — J32.4 CHRONIC PANSINUSITIS: Primary | ICD-10-CM

## 2022-08-19 DIAGNOSIS — Q01.9 ENCEPHALOCELE (H): ICD-10-CM

## 2022-08-19 PROCEDURE — 99207 PR CDG-PROCEDURE CHARGE ONLY: CPT | Performed by: OTOLARYNGOLOGY

## 2022-08-19 PROCEDURE — 31237 NSL/SINS NDSC SURG BX POLYPC: CPT | Mod: LT | Performed by: OTOLARYNGOLOGY

## 2022-08-19 ASSESSMENT — PAIN SCALES - GENERAL: PAINLEVEL: NO PAIN (0)

## 2022-08-19 NOTE — PATIENT INSTRUCTIONS
"You were seen in the clinic today by Dr. Patel. If you have any questions or concerns after your appointment, please call the clinic at [phone]. Press \"1\" for scheduling, press \"3\" for nurse advice.    2.   The following has been recommended for you based upon your appointment today:   -Start once daily sinus rinses.    3.   Plan to return the clinic in 1 month.       Llya Tejada LPN  [facility]  Department of Otolaryngology  [phone]   "

## 2022-08-19 NOTE — PROGRESS NOTES
[facility]  Return Visit  Encounter date:   August 19, 2022    Chief Complaint:   Post operative follow up     ID: CSF rhinorrhea s/p EEA repair of left ethmoid roof skull base defect and encephalocele resection 8/2/22     Interval History:   Jayme Selby is a 67 year old male who presents for follow up s/p EEA repair of left ethmoid roof skull base defect and encephalocele resection 8/2/22. The patient has been following with Dr. Kai Sandoval for many years, and his original sinus surgery in May of 2014 was cancelled. Dr. Sandoval did perform surgery for her in 2014 as noted below. The patient was lost in follow up after these surgeries until last year when he presented to the ED with cough, congestion, and chest tightness. His history of seasonal allergies were exacerbated by pollen and he was safely discharged home with a prednisone course. He then returned to the ED months ago (03/11/2022) for recurrent sinusitis concern, and he was started on amoxicillin. The patient was referred to Dr. Sahni for consultation. The patient consulted with Dr. Sahni on 05/11/2022 and he reported clear rhinorrhea from his left nasal cavity. Imaging done prior to the visit displayed concern for possible skull base dehiscence and CSF leak. Dr. Sahni sent a sample of his rhinorrhea for beta-2 transferrin testing, and referred the patient to my team for fruther evaluation of his case when the patient was referred to my team for further evaluation.     On 8/2/22, patient underwent EEA repair of left ethmoid roof skull base defect with nasoseptal flap.     Today, he presents for a post operative follow up. He reports significant pressure in the nose. He is unable to taste or smell. Of note, patient is a smoker.    Sino-Nasal Outcome Test (SNOT - 22)  [facility] Operative History  Procedure Date: 08/02/2022     PREOPERATIVE DIAGNOSIS:  Left ethmoid roof encephalocele.     POSTOPERATIVE DIAGNOSIS:  Left ethmoid  "roof encephalocele.     PROCEDURES PERFORMED:     1.  Left endoscopic endonasal repair of ethmoid roof CSF leak.  2.  Trona of left sided pedicled nasoseptal flap pedicled off the posterior septal branch of the sphenopalatine artery for skull base reconstruction.  3.  Computerized image guidance, extradural.     SURGEON:  Sin Patel M.D.      RESIDENT:  Tika Richardson M.D.      MED STUDENT:  Yobani Crespo MS4    Review of systems: A 14-point review of systems has been conducted and is negative for any notable symptoms, except as dictated in the history of present illness.     Physical Exam:  Vital signs: BP (!) 167/117 (BP Location: Left arm, Patient Position: Sitting, Cuff Size: Adult Regular)   Pulse 84   Ht 1.88 m (6' 2\")   Wt 118.8 kg (262 lb)   BMI 33.64 kg/m     General Appearance: No acute distress, appropriate demeanor, conversant  Eyes: moist conjunctivae; EOMI; pupils symmetric; visual acuity grossly intact; no proptosis  Head: normocephalic; overall symmetric appearance without deformity  Face: overall symmetric without deformity; HB I/VI  Nose: No external deformity; see endoscopy  Lungs: symmetric chest rise; no wheezing  CV: Good distal perfusion; normal hear rate  Extremities: No deformity  Neurologic Exam: Cranial nerves II-XII are grossly intact; no focal deficit    Procedure Note  Procedure performed: Rigid nasal endoscopy with left sided debridement  Indication: To evaluate for sinonasal pathology not visualized on routine anterior rhinoscopy; Continued and routine endoscopy with debridement is indicated post-operatively to evaluate for CSF leak and defend against post-operative surgical site infection and/or untoward healing.   Anesthesia: 4% topical lidocaine with 0.05 % oxymetazoline  Description of procedure: A 30 degree, 3 mm rigid endoscope was inserted into bilateral nasal cavities and the nasal valves, nasal cavity, middle meatus, sphenoethmoid recess, nasopharynx were " evaluated for evidence of obstruction, edema, purulence, polyps and/or mass/lesion.     I then used a combination of non-cutting forceps, straight and curved suction to clear the left nasal cavity of packing, clot, crust, and fibrinopurulent debris.       Findings  RT: No signs of leakage or infection.   LT: Packing left at skull base. No signs of leakage or infection. Debridement of left.    The patient tolerated the procedure well without complication.     Laboratory Review:   N/A     Imaging Review:   MR Sinus Face (7/13/22)  IMPRESSION:  Findings concerning for focal osseous defect involving the left  cribriform plate with associated meningocele in the left ethmoid  region. Please see the body of report for additional details. If  clinically warranted, CT cisternography could be performed for further  Characterization/confirmation.    CT Facial Bones (7/13/22)                                           IMPRESSION:  Findings concerning for a focal defect in the left aspect of the  cribriform plate with associated left upper ethmoid region  meningocele/meningoencephalocele. If clinically warranted, CT  cisternography could be performed for further characterization. Please  see the body of report for additional details.     Pathology Review:  N/A     Assessment/Medical Decision Making:  CSF rhinorrhea  S/p EEA repair of left ethmoid roof skull base defect and encephalocele resection 8/2/22   Chronic sinusitis    Left endo debridement today    Continued and routine endoscopy with debridement is indicated post-operatively to evaluate for CSF leak and defend against post-operative surgical site infection and/or untoward healing.          Plan:  Jayme Selby is a 67 year old male who presents for follow up s/p EEA repair of left ethmoid roof skull base defect and encephalocele resection 8/2/22. Bilateral endoscopy is reassuring, and debridement was performed on the left. Splints as well as some nasal packing were  removed. I advised rinses with distilled water once daily.    RTC in 1 month    Sin Patel MD    [facility]  Rhinology, Endoscopic Skull Base Surgery  [facility]  Department of Otolaryngology - Head & Neck Surgery    Scribe Disclosure:  I, Aby Herrera, am serving as a scribe to document services personally performed by Sin Patel MD at this visit, based upon the provider's statements to me. All documentation has been reviewed by the aforementioned provider prior to being entered into the official medical record.     Additional portions of the patient's history have been reviewed below.   ~~~~~~~~~~~~~~~~~~~~~~~~~~~~~~~~~~~~~~~~~~~~~~~~~~~~~~~~~~~~~~~~~~~~~~~~~~~~~~~~~~~~~~~~~~~~~~~~~~~~~~~~~~~~~~~~~~~~~~~~~~~~~~~~~~~~~~~    Past Medical History:   Diagnosis Date     Allergic rhinitis due to animal dander     12/5/14 IgE tests pos. only for cat/dog/T (all other environmental allergens NEGATIVE)     Asthma      CSF rhinorrhea      Diagnostic skin and sensitization tests (aka ALLERGENS) 12/05/2014     GERD (gastroesophageal reflux disease)      H. pylori infection 1999     HLD (hyperlipidemia)      HTN (hypertension)      Obesity      Seasonal allergic rhinitis      Tobacco use         Past Surgical History:   Procedure Laterality Date     CHOLECYSTECTOMY  09/1999     ETHMOIDECTOMY  5/7/2014    Procedure: ETHMOIDECTOMY;  Surgeon: Kai Sandoval MD;  Location: [facility] OR     ETHMOIDECTOMY  6/4/2014    Procedure: ETHMOIDECTOMY;  Surgeon: Kai Sandoval MD;  Location: [facility] OR     OPTICAL TRACKING SYSTEM ENDOSCOPIC ENDONASAL SURGERY Bilateral 8/2/2022    Procedure: endoscopic image-guided resection of encephalocele and repair of Cerebrospinal Fluid (CSF) Leak, nasoseptal flap;  Surgeon: Sin Patel MD;  Location:  OR     TONSILLECTOMY       TYMPANOTOMY EXPLORATORY Left 1/29/2015    Procedure: TYMPANOTOMY EXPLORATORY;  Surgeon: Michel He  MD;  Location: [facility] OR        Family History   Problem Relation Age of Onset     Diabetes Mother      LUNG DISEASE Mother      Hypertension Father      C.A.D. Father      Hyperlipidemia Father      Seasonal/Environmental Allergies Sister         Social History     Socioeconomic History     Marital status:    Tobacco Use     Smoking status: Current Every Day Smoker     Packs/day: 0.50     Years: 40.00     Pack years: 20.00     Types: Cigarettes     Smokeless tobacco: Never Used     Tobacco comment: smokes natural cigerettes not in box   Vaping Use     Vaping Use: Never used   Substance and Sexual Activity     Alcohol use: Not Currently     Drug use: No     Sexual activity: Never   Other Topics Concern     Parent/sibling w/ CABG, MI or angioplasty before 65F 55M? Yes   Social History Narrative    March 10, 2017        ENVIRONMENTAL HISTORY: [place] is heated with a forced air and gas furnace. They does have central air conditioning. The patient's bedroom is furnished with carpeting in bedroom, allergen mattress cover, allergen pillowcase cover and fabric window coverings. No pets inside the house. There is not history of cockroach or mice infestation. There are 2 smokers in the house.  The house does not have a basement.

## 2022-08-19 NOTE — NURSING NOTE
"Chief Complaint   Patient presents with     RECHECK     2 week post op, surgery 8/2       Blood pressure (!) 167/117, pulse 84, height 1.88 m (6' 2\"), weight 118.8 kg (262 lb).    Claudine Jarquin, EMT    "

## 2022-08-19 NOTE — LETTER
8/19/2022       RE: Jayme Selby  [address] [facility]  [place] [address]     Dear Colleague,    Thank you for referring your patient, Jayme Selby, to the [facility] at [facility]. Please see a copy of my visit note below.    [facility]  Return Visit  Encounter date:   August 19, 2022    Chief Complaint:   Post operative follow up     ID: CSF rhinorrhea s/p EEA repair of left ethmoid roof skull base defect and encephalocele resection 8/2/22     Interval History:   Jayme Selby is a 67 year old male who presents for follow up s/p EEA repair of left ethmoid roof skull base defect and encephalocele resection 8/2/22. The patient has been following with Dr. Kai Sandoval for many years, and his original sinus surgery in May of 2014 was cancelled. Dr. Sandoval did perform surgery for her in 2014 as noted below. The patient was lost in follow up after these surgeries until last year when he presented to the ED with cough, congestion, and chest tightness. His history of seasonal allergies were exacerbated by pollen and he was safely discharged home with a prednisone course. He then returned to the ED months ago (03/11/2022) for recurrent sinusitis concern, and he was started on amoxicillin. The patient was referred to Dr. Sahni for consultation. The patient consulted with Dr. Sahni on 05/11/2022 and he reported clear rhinorrhea from his left nasal cavity. Imaging done prior to the visit displayed concern for possible skull base dehiscence and CSF leak. Dr. Sahni sent a sample of his rhinorrhea for beta-2 transferrin testing, and referred the patient to my team for fruther evaluation of his case when the patient was referred to my team for further evaluation.     On 8/2/22, patient underwent EEA repair of left ethmoid roof skull base defect with nasoseptal flap.     Today, he presents for a post operative follow up.  "He reports significant pressure in the nose. He is unable to taste or smell. Of note, patient is a smoker.    Sino-Nasal Outcome Test (SNOT - 22)  [facility] Operative History  Procedure Date: 08/02/2022     PREOPERATIVE DIAGNOSIS:  Left ethmoid roof encephalocele.     POSTOPERATIVE DIAGNOSIS:  Left ethmoid roof encephalocele.     PROCEDURES PERFORMED:     1.  Left endoscopic endonasal repair of ethmoid roof CSF leak.  2.  Seligman of left sided pedicled nasoseptal flap pedicled off the posterior septal branch of the sphenopalatine artery for skull base reconstruction.  3.  Computerized image guidance, extradural.     SURGEON:  Sin Patel M.D.      RESIDENT:  Tika Richardson M.D.      MED STUDENT:  Yobani Crespo MS4    Review of systems: A 14-point review of systems has been conducted and is negative for any notable symptoms, except as dictated in the history of present illness.     Physical Exam:  Vital signs: BP (!) 167/117 (BP Location: Left arm, Patient Position: Sitting, Cuff Size: Adult Regular)   Pulse 84   Ht 1.88 m (6' 2\")   Wt 118.8 kg (262 lb)   BMI 33.64 kg/m     General Appearance: No acute distress, appropriate demeanor, conversant  Eyes: moist conjunctivae; EOMI; pupils symmetric; visual acuity grossly intact; no proptosis  Head: normocephalic; overall symmetric appearance without deformity  Face: overall symmetric without deformity; HB I/VI  Nose: No external deformity; see endoscopy  Lungs: symmetric chest rise; no wheezing  CV: Good distal perfusion; normal hear rate  Extremities: No deformity  Neurologic Exam: Cranial nerves II-XII are grossly intact; no focal deficit    Procedure Note  Procedure performed: Rigid nasal endoscopy with left sided debridement  Indication: To evaluate for sinonasal pathology not visualized on routine anterior rhinoscopy; Continued and routine endoscopy with debridement is indicated post-operatively to evaluate for CSF leak and defend against " post-operative surgical site infection and/or untoward healing.   Anesthesia: 4% topical lidocaine with 0.05 % oxymetazoline  Description of procedure: A 30 degree, 3 mm rigid endoscope was inserted into bilateral nasal cavities and the nasal valves, nasal cavity, middle meatus, sphenoethmoid recess, nasopharynx were evaluated for evidence of obstruction, edema, purulence, polyps and/or mass/lesion.     I then used a combination of non-cutting forceps, straight and curved suction to clear the left nasal cavity of packing, clot, crust, and fibrinopurulent debris.       Findings  RT: No signs of leakage or infection.   LT: Packing left at skull base. No signs of leakage or infection. Debridement of left.    The patient tolerated the procedure well without complication.     Laboratory Review:   N/A     Imaging Review:   MR Sinus Face (7/13/22)  IMPRESSION:  Findings concerning for focal osseous defect involving the left  cribriform plate with associated meningocele in the left ethmoid  region. Please see the body of report for additional details. If  clinically warranted, CT cisternography could be performed for further  Characterization/confirmation.    CT Facial Bones (7/13/22)                                           IMPRESSION:  Findings concerning for a focal defect in the left aspect of the  cribriform plate with associated left upper ethmoid region  meningocele/meningoencephalocele. If clinically warranted, CT  cisternography could be performed for further characterization. Please  see the body of report for additional details.     Pathology Review:  N/A     Assessment/Medical Decision Making:  CSF rhinorrhea  S/p EEA repair of left ethmoid roof skull base defect and encephalocele resection 8/2/22   Chronic sinusitis    Left endo debridement today    Continued and routine endoscopy with debridement is indicated post-operatively to evaluate for CSF leak and defend against post-operative surgical site infection  and/or untoward healing.          Plan:  Jayme Selby is a 67 year old male who presents for follow up s/p EEA repair of left ethmoid roof skull base defect and encephalocele resection 8/2/22. Bilateral endoscopy is reassuring, and debridement was performed on the left. Splints as well as some nasal packing were removed. I advised rinses with distilled water once daily.    RTC in 1 month    Sin Patel MD    [facility]  Rhinology, Endoscopic Skull Base Surgery  [facility]  Department of Otolaryngology - Head & Neck Surgery    Scribe Disclosure:  I, Aby Herrera, am serving as a scribe to document services personally performed by Sin Patel MD at this visit, based upon the provider's statements to me. All documentation has been reviewed by the aforementioned provider prior to being entered into the official medical record.     Additional portions of the patient's history have been reviewed below.   ~~~~~~~~~~~~~~~~~~~~~~~~~~~~~~~~~~~~~~~~~~~~~~~~~~~~~~~~~~~~~~~~~~~~~~~~~~~~~~~~~~~~~~~~~~~~~~~~~~~~~~~~~~~~~~~~~~~~~~~~~~~~~~~~~~~~~~~    Past Medical History:   Diagnosis Date     Allergic rhinitis due to animal dander     12/5/14 IgE tests pos. only for cat/dog/T (all other environmental allergens NEGATIVE)     Asthma      CSF rhinorrhea      Diagnostic skin and sensitization tests (aka ALLERGENS) 12/05/2014     GERD (gastroesophageal reflux disease)      H. pylori infection 1999     HLD (hyperlipidemia)      HTN (hypertension)      Obesity      Seasonal allergic rhinitis      Tobacco use         Past Surgical History:   Procedure Laterality Date     CHOLECYSTECTOMY  09/1999     ETHMOIDECTOMY  5/7/2014    Procedure: ETHMOIDECTOMY;  Surgeon: Kai Sandoval MD;  Location: [facility] OR     ETHMOIDECTOMY  6/4/2014    Procedure: ETHMOIDECTOMY;  Surgeon: Kai Sandoval MD;  Location: [facility] OR     OPTICAL TRACKING SYSTEM ENDOSCOPIC ENDONASAL SURGERY Bilateral  8/2/2022    Procedure: endoscopic image-guided resection of encephalocele and repair of Cerebrospinal Fluid (CSF) Leak, nasoseptal flap;  Surgeon: Sin Patel MD;  Location: [facility] OR     TONSILLECTOMY       TYMPANOTOMY EXPLORATORY Left 1/29/2015    Procedure: TYMPANOTOMY EXPLORATORY;  Surgeon: Michel He MD;  Location: [facility] OR        Family History   Problem Relation Age of Onset     Diabetes Mother      LUNG DISEASE Mother      Hypertension Father      C.A.D. Father      Hyperlipidemia Father      Seasonal/Environmental Allergies Sister         Social History     Socioeconomic History     Marital status:    Tobacco Use     Smoking status: Current Every Day Smoker     Packs/day: 0.50     Years: 40.00     Pack years: 20.00     Types: Cigarettes     Smokeless tobacco: Never Used     Tobacco comment: smokes natural cigerettes not in box   Vaping Use     Vaping Use: Never used   Substance and Sexual Activity     Alcohol use: Not Currently     Drug use: No     Sexual activity: Never   Other Topics Concern     Parent/sibling w/ CABG, MI or angioplasty before 65F 55M? Yes   Social History Narrative    March 10, 2017        ENVIRONMENTAL HISTORY: [place] The home is heated with a forced air and gas furnace. They does have central air conditioning. The patient's bedroom is furnished with carpeting in bedroom, allergen mattress cover, allergen pillowcase cover and fabric window coverings. No pets inside the house. There is not history of cockroach or mice infestation. There are 2 smokers in the house.  The house does not have a basement.       Again, thank you for allowing me to participate in the care of your patient.      Sincerely,    Sin Patel MD

## 2022-09-10 ENCOUNTER — HEALTH MAINTENANCE LETTER (OUTPATIENT)
Age: 67
End: 2022-09-10

## 2023-04-30 ENCOUNTER — HEALTH MAINTENANCE LETTER (OUTPATIENT)
Age: 68
End: 2023-04-30

## 2023-05-04 DIAGNOSIS — J45.21 MILD INTERMITTENT ASTHMA WITH ACUTE EXACERBATION: ICD-10-CM

## 2023-05-08 RX ORDER — BECLOMETHASONE DIPROPIONATE HFA 80 UG/1
AEROSOL, METERED RESPIRATORY (INHALATION)
Qty: 10.6 G | Refills: 11 | Status: SHIPPED | OUTPATIENT
Start: 2023-05-08 | End: 2023-05-10

## 2023-05-10 ENCOUNTER — OFFICE VISIT (OUTPATIENT)
Dept: FAMILY MEDICINE | Facility: CLINIC | Age: 68
End: 2023-05-10
Payer: MEDICARE

## 2023-05-10 VITALS
HEIGHT: 74 IN | BODY MASS INDEX: 32.34 KG/M2 | SYSTOLIC BLOOD PRESSURE: 156 MMHG | WEIGHT: 252 LBS | HEART RATE: 82 BPM | TEMPERATURE: 99.4 F | RESPIRATION RATE: 18 BRPM | DIASTOLIC BLOOD PRESSURE: 98 MMHG | OXYGEN SATURATION: 95 %

## 2023-05-10 DIAGNOSIS — J45.20 MILD INTERMITTENT ASTHMA WITHOUT COMPLICATION: ICD-10-CM

## 2023-05-10 DIAGNOSIS — J30.2 SEASONAL ALLERGIC RHINITIS, UNSPECIFIED TRIGGER: Primary | ICD-10-CM

## 2023-05-10 DIAGNOSIS — I10 UNCONTROLLED HYPERTENSION: ICD-10-CM

## 2023-05-10 PROCEDURE — 99214 OFFICE O/P EST MOD 30 MIN: CPT | Performed by: NURSE PRACTITIONER

## 2023-05-10 RX ORDER — FLUTICASONE PROPIONATE 50 MCG
SPRAY, SUSPENSION (ML) NASAL
Qty: 16 G | Refills: 10 | Status: SHIPPED | OUTPATIENT
Start: 2023-05-10 | End: 2024-06-13

## 2023-05-10 RX ORDER — BECLOMETHASONE DIPROPIONATE HFA 80 UG/1
AEROSOL, METERED RESPIRATORY (INHALATION)
Qty: 10.6 G | Refills: 11 | Status: SHIPPED | OUTPATIENT
Start: 2023-05-10 | End: 2024-05-28

## 2023-05-10 RX ORDER — ALBUTEROL SULFATE 90 UG/1
AEROSOL, METERED RESPIRATORY (INHALATION)
Qty: 6.7 G | Refills: 11 | Status: SHIPPED | OUTPATIENT
Start: 2023-05-10 | End: 2024-06-13

## 2023-05-10 ASSESSMENT — ASTHMA QUESTIONNAIRES
ACT_TOTALSCORE: 13
QUESTION_5 LAST FOUR WEEKS HOW WOULD YOU RATE YOUR ASTHMA CONTROL: SOMEWHAT CONTROLLED
QUESTION_3 LAST FOUR WEEKS HOW OFTEN DID YOUR ASTHMA SYMPTOMS (WHEEZING, COUGHING, SHORTNESS OF BREATH, CHEST TIGHTNESS OR PAIN) WAKE YOU UP AT NIGHT OR EARLIER THAN USUAL IN THE MORNING: TWO OR THREE NIGHTS A WEEK
QUESTION_2 LAST FOUR WEEKS HOW OFTEN HAVE YOU HAD SHORTNESS OF BREATH: ONCE A DAY
QUESTION_1 LAST FOUR WEEKS HOW MUCH OF THE TIME DID YOUR ASTHMA KEEP YOU FROM GETTING AS MUCH DONE AT WORK, SCHOOL OR AT HOME: NONE OF THE TIME
QUESTION_4 LAST FOUR WEEKS HOW OFTEN HAVE YOU USED YOUR RESCUE INHALER OR NEBULIZER MEDICATION (SUCH AS ALBUTEROL): THREE OR MORE TIMES PER DAY
ACT_TOTALSCORE: 13

## 2023-05-10 ASSESSMENT — PAIN SCALES - GENERAL: PAINLEVEL: NO PAIN (0)

## 2023-05-10 NOTE — PROGRESS NOTES
"  Assessment & Plan     Seasonal allergic rhinitis, unspecified trigger    - fluticasone (FLONASE) 50 MCG/ACT nasal spray; USE 2 SPRAYS IN EACH NOSTRIL EVERY DAY AS NEEDED    Mild intermittent asthma without complication    - albuterol (PROAIR HFA) 108 (90 Base) MCG/ACT inhaler; INHALE 2 PUFFS INTO THE LUNGS EVERY 4 HOURS AS NEEDED  - beclomethasone HFA (QVAR REDIHALER) 80 MCG/ACT inhaler; Inhale 1 puff into the lungs 2 times daily During allergy seasons  Pts alb inhal, QVAR inhaler, and flonase refilled per pt request and management of seasonal allergies. F/u with PCP as needed.     Uncontrolled hypertension  Pt aware of hypertension. Addressed with pt and refuses medication management.     [id number]}     Nicotine/Tobacco Cessation:  He reports that he has been smoking cigarettes. He has a 20.00 pack-year smoking history. He has never used smokeless tobacco.  Nicotine/Tobacco Cessation Plan:   Information offered: Patient not interested at this time      BMI:   Estimated body mass index is 32.35 kg/m  as calculated from the following:    Height as of this encounter: 1.88 m (6' 2\").    Weight as of this encounter: 114.3 kg (252 lb).       Patient Instructions   Medications are refilled.    Lifestyle changes that can lower blood pressure include:  Limiting sodium in the diet.  Goal of <2.3 grams (2300 mg) daily.  Avoiding salty and prepared foods and not adding salt at the table can help.   Regular moderate exercise at least 150 minutes per week (30 minutes per day 5 days per week) plus muscle strengthening exercise at least 2 days per week.   Weight loss can help even if not dramatic or down to normal BMI range.  DASH type diet can actually lower blood pressure. You can find multiple resources for this on the internet.   Increase your intake of high potassium foods (raisins, prunes, apricots, dates, strawberries, bananas, watermelon, cantelope, citrus fruits, beets, greens, spinach, tomatoes, mushrooms, peas, beans, " "salmon, cod, soy products, veggie burgers, turkey, beef).  Cutting back on alcohol can help for women drinking more than a drink per day and men more than 2 drinks per day on average.   Quitting smoking can help reduce your risk of heart attack or stroke.    A good resource for lifestyle changes is https://www.cardiosmart.org/topics/healthy-living            Ashlyn Cross RN Student NP  [employer]    Ivan Fisher is a 68 year old, presenting for the following health issues:  Allergies        5/10/2023     7:05 AM   Additional Questions   Roomed by Lennie LOUIS   Accompanied by self     History of Present Illness       Reason for visit:  Allergies    He eats 2-3 servings of fruits and vegetables daily.He consumes 1 sweetened beverage(s) daily.He exercises with enough effort to increase his heart rate 20 to 29 minutes per day.  He exercises with enough effort to increase his heart rate 3 or less days per week.   He is taking medications regularly.       Would like refills on Flonase, QVAR, and Albuterol for allergy season.      Pt states \"needs refills on medication.\" Pt states needs Alb inhal, beclomethasone HFA inhal, and flonase. Pt states makes own cigarettes \"American [employer] tobacco\" and smokes daily. No interest in quiting. Pt states is aware of \"high blood pressure\" and not concerned with it as states \"will not take anything for it.\" Pt denies HA, vision changes. No fevers. No vomiting or recent illnesses.         Review of Systems   Constitutional, HEENT, cardiovascular, pulmonary, GI, , musculoskeletal, neuro, skin, endocrine and psych systems are negative, except as otherwise noted.      Objective    BP (!) 156/98   Pulse 82   Temp 99.4  F (37.4  C) (Tympanic)   Resp 18   Ht 1.88 m (6' 2\")   Wt 114.3 kg (252 lb)   SpO2 95%   BMI 32.35 kg/m    Body mass index is 32.35 kg/m .  Physical Exam   GENERAL appears well, no distress  RESP reg rate and rhythm. Unlabored. LS with " scattered expiratory whz, +occasional cough.   CARDIAC RRR without murmurs.

## 2023-05-10 NOTE — PATIENT INSTRUCTIONS
Medications are refilled.    Lifestyle changes that can lower blood pressure include:  Limiting sodium in the diet.  Goal of <2.3 grams (2300 mg) daily.  Avoiding salty and prepared foods and not adding salt at the table can help.   Regular moderate exercise at least 150 minutes per week (30 minutes per day 5 days per week) plus muscle strengthening exercise at least 2 days per week.   Weight loss can help even if not dramatic or down to normal BMI range.  DASH type diet can actually lower blood pressure. You can find multiple resources for this on the internet.   Increase your intake of high potassium foods (raisins, prunes, apricots, dates, strawberries, bananas, watermelon, cantelope, citrus fruits, beets, greens, spinach, tomatoes, mushrooms, peas, beans, salmon, cod, soy products, veggie burgers, turkey, beef).  Cutting back on alcohol can help for women drinking more than a drink per day and men more than 2 drinks per day on average.   Quitting smoking can help reduce your risk of heart attack or stroke.    A good resource for lifestyle changes is https://www.cardiosmart.org/topics/healthy-living

## 2024-01-01 NOTE — PATIENT INSTRUCTIONS
I encourage you to give the blood pressure medication another try.  Call to schedule with cardiology for further help managing your high blood pressure.  Finish antibiotic, no signs of recurrent infections today.  Call to schedule with allergist.  Good job with smoking cessation and keep that up!  Follow up if symptoms persist or worsen and as needed.        Thank you for choosing [facility].  You may be receiving a survey in the mail from [employer] regarding your visit today.  Please take a few minutes to complete and return the survey to let us know how we are doing.      Our Clinic hours are:  Mondays    7:20 am - 7 pm  Tues -  Fri  7:20 am - 5 pm    Clinic Phone: [phone]    The clinic lab opens at 7:30 am Mon - Fri and appointments are required.    [facility]. [phone]  Monday-Thursday 8 am - 7pm  Tues/Wed/Fri 8 am - 5:30 pm         
Assessment and Plan of Care:     [x ] Normal / Healthy [place]

## 2024-05-28 DIAGNOSIS — J45.20 MILD INTERMITTENT ASTHMA WITHOUT COMPLICATION: ICD-10-CM

## 2024-05-28 NOTE — TELEPHONE ENCOUNTER
Medication Question or Refill    Unable to route to [place]   Patient has upcoming appt 6/13 with Dr. Mota-he is requesting this asap as he only has a few puffs left.       What medication are you calling about (include dose and sig)?:     beclomethasone HFA (QVAR REDIHALER) 80 MCG/ACT inhaler       [facility] [address] [address] [address]  Phone: [phone] Fax: [phone]      Do you need a refill? Yes      No call back necessary unless denied or questions.     CAMERON Adams

## 2024-05-30 RX ORDER — BECLOMETHASONE DIPROPIONATE HFA 80 UG/1
AEROSOL, METERED RESPIRATORY (INHALATION)
Qty: 10.6 G | Refills: 11 | Status: SHIPPED | OUTPATIENT
Start: 2024-05-30 | End: 2024-06-13

## 2024-06-13 ENCOUNTER — OFFICE VISIT (OUTPATIENT)
Dept: FAMILY MEDICINE | Facility: CLINIC | Age: 69
End: 2024-06-13
Payer: MEDICARE

## 2024-06-13 VITALS
OXYGEN SATURATION: 96 % | DIASTOLIC BLOOD PRESSURE: 130 MMHG | WEIGHT: 252 LBS | SYSTOLIC BLOOD PRESSURE: 178 MMHG | BODY MASS INDEX: 32.34 KG/M2 | HEART RATE: 76 BPM | RESPIRATION RATE: 18 BRPM | HEIGHT: 74 IN

## 2024-06-13 DIAGNOSIS — Z83.3 FAMILY HISTORY OF DIABETES MELLITUS: ICD-10-CM

## 2024-06-13 DIAGNOSIS — J30.2 SEASONAL ALLERGIC RHINITIS, UNSPECIFIED TRIGGER: ICD-10-CM

## 2024-06-13 DIAGNOSIS — Z12.11 SCREEN FOR COLON CANCER: ICD-10-CM

## 2024-06-13 DIAGNOSIS — Q01.9 ENCEPHALOCELE (H): ICD-10-CM

## 2024-06-13 DIAGNOSIS — E78.5 HYPERLIPIDEMIA LDL GOAL <130: Primary | ICD-10-CM

## 2024-06-13 DIAGNOSIS — J45.20 MILD INTERMITTENT ASTHMA WITHOUT COMPLICATION: ICD-10-CM

## 2024-06-13 DIAGNOSIS — I10 HYPERTENSION, UNSPECIFIED TYPE: ICD-10-CM

## 2024-06-13 LAB
ALBUMIN SERPL BCG-MCNC: 4.6 G/DL (ref 3.5–5.2)
ALP SERPL-CCNC: 104 U/L (ref 40–150)
ALT SERPL W P-5'-P-CCNC: 21 U/L (ref 0–70)
ANION GAP SERPL CALCULATED.3IONS-SCNC: 13 MMOL/L (ref 7–15)
AST SERPL W P-5'-P-CCNC: 25 U/L (ref 0–45)
BASOPHILS # BLD AUTO: 0 10E3/UL (ref 0–0.2)
BASOPHILS NFR BLD AUTO: 0 %
BILIRUB SERPL-MCNC: 0.6 MG/DL
BUN SERPL-MCNC: 15.7 MG/DL (ref 8–23)
CALCIUM SERPL-MCNC: 9.3 MG/DL (ref 8.8–10.2)
CHLORIDE SERPL-SCNC: 103 MMOL/L (ref 98–107)
CHOLEST SERPL-MCNC: 191 MG/DL
CREAT SERPL-MCNC: 1.27 MG/DL (ref 0.67–1.17)
DEPRECATED HCO3 PLAS-SCNC: 24 MMOL/L (ref 22–29)
EGFRCR SERPLBLD CKD-EPI 2021: 62 ML/MIN/1.73M2
EOSINOPHIL # BLD AUTO: 0.2 10E3/UL (ref 0–0.7)
EOSINOPHIL NFR BLD AUTO: 3 %
ERYTHROCYTE [DISTWIDTH] IN BLOOD BY AUTOMATED COUNT: 13 % (ref 10–15)
FASTING STATUS PATIENT QL REPORTED: YES
FASTING STATUS PATIENT QL REPORTED: YES
GLUCOSE SERPL-MCNC: 110 MG/DL (ref 70–99)
HBA1C MFR BLD: 5.6 % (ref 0–5.6)
HCT VFR BLD AUTO: 47 % (ref 40–53)
HDLC SERPL-MCNC: 29 MG/DL
HGB BLD-MCNC: 16.2 G/DL (ref 13.3–17.7)
IMM GRANULOCYTES # BLD: 0 10E3/UL
IMM GRANULOCYTES NFR BLD: 0 %
LDLC SERPL CALC-MCNC: 99 MG/DL
LYMPHOCYTES # BLD AUTO: 2 10E3/UL (ref 0.8–5.3)
LYMPHOCYTES NFR BLD AUTO: 22 %
MCH RBC QN AUTO: 29.7 PG (ref 26.5–33)
MCHC RBC AUTO-ENTMCNC: 34.5 G/DL (ref 31.5–36.5)
MCV RBC AUTO: 86 FL (ref 78–100)
MONOCYTES # BLD AUTO: 0.7 10E3/UL (ref 0–1.3)
MONOCYTES NFR BLD AUTO: 8 %
NEUTROPHILS # BLD AUTO: 6 10E3/UL (ref 1.6–8.3)
NEUTROPHILS NFR BLD AUTO: 67 %
NONHDLC SERPL-MCNC: 162 MG/DL
PLATELET # BLD AUTO: 234 10E3/UL (ref 150–450)
POTASSIUM SERPL-SCNC: 4.3 MMOL/L (ref 3.4–5.3)
PROT SERPL-MCNC: 7.3 G/DL (ref 6.4–8.3)
RBC # BLD AUTO: 5.46 10E6/UL (ref 4.4–5.9)
SODIUM SERPL-SCNC: 140 MMOL/L (ref 135–145)
TRIGL SERPL-MCNC: 316 MG/DL
WBC # BLD AUTO: 9 10E3/UL (ref 4–11)

## 2024-06-13 PROCEDURE — 99214 OFFICE O/P EST MOD 30 MIN: CPT | Performed by: FAMILY MEDICINE

## 2024-06-13 PROCEDURE — 83036 HEMOGLOBIN GLYCOSYLATED A1C: CPT | Performed by: FAMILY MEDICINE

## 2024-06-13 PROCEDURE — 80053 COMPREHEN METABOLIC PANEL: CPT | Performed by: FAMILY MEDICINE

## 2024-06-13 PROCEDURE — 85025 COMPLETE CBC W/AUTO DIFF WBC: CPT | Performed by: FAMILY MEDICINE

## 2024-06-13 PROCEDURE — 80061 LIPID PANEL: CPT | Performed by: FAMILY MEDICINE

## 2024-06-13 PROCEDURE — 36415 COLL VENOUS BLD VENIPUNCTURE: CPT | Performed by: FAMILY MEDICINE

## 2024-06-13 RX ORDER — ALBUTEROL SULFATE 90 UG/1
AEROSOL, METERED RESPIRATORY (INHALATION)
Qty: 6.7 G | Refills: 11 | Status: SHIPPED | OUTPATIENT
Start: 2024-06-13

## 2024-06-13 RX ORDER — FLUTICASONE PROPIONATE 50 MCG
SPRAY, SUSPENSION (ML) NASAL
Qty: 16 G | Refills: 10 | Status: SHIPPED | OUTPATIENT
Start: 2024-06-13

## 2024-06-13 RX ORDER — METOPROLOL SUCCINATE 25 MG/1
25 TABLET, EXTENDED RELEASE ORAL DAILY
Qty: 90 TABLET | Refills: 0 | Status: SHIPPED | OUTPATIENT
Start: 2024-06-13

## 2024-06-13 RX ORDER — CHOLECALCIFEROL (VITAMIN D3) 10(400)/ML
DROPS ORAL DAILY
COMMUNITY

## 2024-06-13 RX ORDER — BECLOMETHASONE DIPROPIONATE HFA 80 UG/1
AEROSOL, METERED RESPIRATORY (INHALATION)
Qty: 10.6 G | Refills: 11 | Status: SHIPPED | OUTPATIENT
Start: 2024-06-13

## 2024-06-13 RX ORDER — RESPIRATORY SYNCYTIAL VIRUS VACCINE 120MCG/0.5
0.5 KIT INTRAMUSCULAR ONCE
Qty: 1 EACH | Refills: 0 | Status: CANCELLED | OUTPATIENT
Start: 2024-06-13 | End: 2024-06-13

## 2024-06-13 ASSESSMENT — ASTHMA QUESTIONNAIRES
QUESTION_1 LAST FOUR WEEKS HOW MUCH OF THE TIME DID YOUR ASTHMA KEEP YOU FROM GETTING AS MUCH DONE AT WORK, SCHOOL OR AT HOME: NONE OF THE TIME
QUESTION_2 LAST FOUR WEEKS HOW OFTEN HAVE YOU HAD SHORTNESS OF BREATH: NOT AT ALL
QUESTION_3 LAST FOUR WEEKS HOW OFTEN DID YOUR ASTHMA SYMPTOMS (WHEEZING, COUGHING, SHORTNESS OF BREATH, CHEST TIGHTNESS OR PAIN) WAKE YOU UP AT NIGHT OR EARLIER THAN USUAL IN THE MORNING: NOT AT ALL
ACT_TOTALSCORE: 23
QUESTION_4 LAST FOUR WEEKS HOW OFTEN HAVE YOU USED YOUR RESCUE INHALER OR NEBULIZER MEDICATION (SUCH AS ALBUTEROL): TWO OR THREE TIMES PER WEEK
QUESTION_5 LAST FOUR WEEKS HOW WOULD YOU RATE YOUR ASTHMA CONTROL: COMPLETELY CONTROLLED
ACT_TOTALSCORE: 23

## 2024-06-13 NOTE — LETTER
August 5, 2024      Phillip Selby  [address] [address] [address]        Dear ,    We are writing to inform you of your test results.     Kidney function is worse on your recent testing-I am concerned that this is from your longstanding high blood pressure.  We should plan to recheck this and other labs when I see you next.  Please ensure that you are drinking good amount of water and avoid anti-inflammatories.     Triglyceride levels are high-cutting down on alcohol and processed foods will help to improve this.     Results for orders placed or performed in visit on 06/13/24   Comprehensive metabolic panel (BMP + Alb, Alk Phos, ALT, AST, Total. Bili, TP)     Status: Abnormal   Result Value Ref Range    Sodium 140 135 - 145 mmol/L    Potassium 4.3 3.4 - 5.3 mmol/L    Carbon Dioxide (CO2) 24 22 - 29 mmol/L    Anion Gap 13 7 - 15 mmol/L    Urea Nitrogen 15.7 8.0 - 23.0 mg/dL    Creatinine 1.27 (H) 0.67 - 1.17 mg/dL    GFR Estimate 62 >60 mL/min/1.73m2    Calcium 9.3 8.8 - 10.2 mg/dL    Chloride 103 98 - 107 mmol/L    Glucose 110 (H) 70 - 99 mg/dL    Alkaline Phosphatase 104 40 - 150 U/L    AST 25 0 - 45 U/L    ALT 21 0 - 70 U/L    Protein Total 7.3 6.4 - 8.3 g/dL    Albumin 4.6 3.5 - 5.2 g/dL    Bilirubin Total 0.6 <=1.2 mg/dL    Patient Fasting > 8hrs? Yes    Lipid panel reflex to direct LDL Fasting     Status: Abnormal   Result Value Ref Range    Cholesterol 191 <200 mg/dL    Triglycerides 316 (H) <150 mg/dL    Direct Measure HDL 29 (L) >=40 mg/dL    LDL Cholesterol Calculated 99 <=100 mg/dL    Non HDL Cholesterol 162 (H) <130 mg/dL    Patient Fasting > 8hrs? Yes     Narrative    Cholesterol  Desirable:  <200 mg/dL    Triglycerides  Normal:  Less than 150 mg/dL  Borderline High:  150-199 mg/dL  High:  200-499 mg/dL  Very High:  Greater than or equal to 500 mg/dL    Direct Measure HDL  Female:  Greater than or equal to 50 mg/dL   Male:  Greater than or equal to 40 mg/dL    LDL Cholesterol  Desirable:   <100mg/dL  Above Desirable:  100-129 mg/dL   Borderline High:  130-159 mg/dL   High:  160-189 mg/dL   Very High:  >= 190 mg/dL    Non HDL Cholesterol  Desirable:  130 mg/dL  Above Desirable:  130-159 mg/dL  Borderline High:  160-189 mg/dL  High:  190-219 mg/dL  Very High:  Greater than or equal to 220 mg/dL   Hemoglobin A1c     Status: Normal   Result Value Ref Range    Hemoglobin A1C 5.6 0.0 - 5.6 %   CBC with platelets and differential     Status: None   Result Value Ref Range    WBC Count 9.0 4.0 - 11.0 10e3/uL    RBC Count 5.46 4.40 - 5.90 10e6/uL    Hemoglobin 16.2 13.3 - 17.7 g/dL    Hematocrit 47.0 40.0 - 53.0 %    MCV 86 78 - 100 fL    MCH 29.7 26.5 - 33.0 pg    MCHC 34.5 31.5 - 36.5 g/dL    RDW 13.0 10.0 - 15.0 %    Platelet Count 234 150 - 450 10e3/uL    % Neutrophils 67 %    % Lymphocytes 22 %    % Monocytes 8 %    % Eosinophils 3 %    % Basophils 0 %    % Immature Granulocytes 0 %    Absolute Neutrophils 6.0 1.6 - 8.3 10e3/uL    Absolute Lymphocytes 2.0 0.8 - 5.3 10e3/uL    Absolute Monocytes 0.7 0.0 - 1.3 10e3/uL    Absolute Eosinophils 0.2 0.0 - 0.7 10e3/uL    Absolute Basophils 0.0 0.0 - 0.2 10e3/uL    Absolute Immature Granulocytes 0.0 <=0.4 10e3/uL   CBC with platelets and differential     Status: None    Narrative    The following orders were created for panel order CBC with platelets and differential.  Procedure                               Abnormality         Status                     ---------                               -----------         ------                     CBC with platelets and d...[[id number]]                      Final result                 Please view results for these tests on the individual orders.         If you have any questions or concerns, please call the clinic at the number listed above.       Sincerely,        Sahara Mota, DO

## 2024-06-13 NOTE — PROGRESS NOTES
Assessment & Plan     Hyperlipidemia LDL goal <130  - Lipid panel reflex to direct LDL Fasting  - Lipid panel reflex to direct LDL Fasting    Screen for colon cancer  - Fecal colorectal cancer screen FIT - Future (S+30)    Mild intermittent asthma without complication  Well-controlled  - albuterol (PROAIR HFA) 108 (90 Base) MCG/ACT inhaler  Dispense: 6.7 g; Refill: 11  - beclomethasone HFA (QVAR REDIHALER) 80 MCG/ACT inhaler  Dispense: 10.6 g; Refill: 11    Seasonal allergic rhinitis, unspecified trigger  - fluticasone (FLONASE) 50 MCG/ACT nasal spray  Dispense: 16 g; Refill: 10    Hypertension, unspecified type  Reviewed chart and chronic longstanding elevated blood pressure.  Previously had worked with nephrology.  He is unfortunately having side effects from transition to blood pressure medications.  After long discussion willing to try low-dose of metoprolol.  Close follow-up in 1 month.  - CBC with platelets and differential  - Comprehensive metabolic panel (BMP + Alb, Alk Phos, ALT, AST, Total. Bili, TP)  - metoprolol succinate ER (TOPROL XL) 25 MG 24 hr tablet  Dispense: 90 tablet; Refill: 0  - CBC with platelets and differential  - Comprehensive metabolic panel (BMP + Alb, Alk Phos, ALT, AST, Total. Bili, TP)    Family history of diabetes mellitus  - Hemoglobin A1c  - Hemoglobin A1c    Encephalocoele:  Followed with neurosurgery     Subjective   Phillip is a 69 year old, presenting for the following health issues:  Hypertension        6/13/2024     1:49 PM   Additional Questions   Roomed by Ivonne COBURN MA   Accompanied by Self     History of Present Illness       Hypertension: He presents for follow up of hypertension.  He does not check blood pressure  regularly outside of the clinic. Outpatient blood pressures have not been over 140/90. He does not follow a low salt diet.     He eats 0-1 servings of fruits and vegetables daily.He consumes 3 sweetened beverage(s) daily.He exercises with enough effort to  "increase his heart rate 20 to 29 minutes per day.  He exercises with enough effort to increase his heart rate 3 or less days per week.   He is taking medications regularly.     Reports she has had elevated blood pressure since he was in his 20s.  Family history of diabetes and CAD.  He is unfortunately had side effects from any blood pressure medications.  Does not check his blood pressure at home.  Denies headaches or vision changes.    History of asthma, needing refills of inhalers.  Very well-controlled with no flares    Seasonal allergies taking Zyrtec daily      Review of Systems  Constitutional, HEENT, cardiovascular, pulmonary, gi and gu systems are negative, except as otherwise noted.      Objective    BP (!) 178/130 (BP Location: Right arm, Patient Position: Chair, Cuff Size: Adult Large)   Pulse 76   Resp 18   Ht 1.88 m (6' 2\")   Wt 114.3 kg (252 lb)   SpO2 96%   BMI 32.35 kg/m    Body mass index is 32.35 kg/m .  Physical Exam  Constitutional:       Appearance: Normal appearance.   HENT:      Head: Normocephalic and atraumatic.   Eyes:      Conjunctiva/sclera: Conjunctivae normal.   Cardiovascular:      Rate and Rhythm: Normal rate and regular rhythm.      Pulses: Normal pulses.   Pulmonary:      Effort: Pulmonary effort is normal.      Breath sounds: Normal breath sounds.   Abdominal:      General: Bowel sounds are normal.   Skin:     General: Skin is warm and dry.   Neurological:      General: No focal deficit present.      Mental Status: He is alert.   Psychiatric:         Mood and Affect: Mood normal.            Signed Electronically by: ASHISH LANCASTER DO    "

## 2024-07-07 ENCOUNTER — HEALTH MAINTENANCE LETTER (OUTPATIENT)
Age: 69
End: 2024-07-07

## 2024-07-12 PROCEDURE — 82274 ASSAY TEST FOR BLOOD FECAL: CPT | Performed by: FAMILY MEDICINE

## 2024-07-23 LAB — HEMOCCULT STL QL IA: NEGATIVE

## 2025-01-20 ENCOUNTER — TELEPHONE (OUTPATIENT)
Dept: FAMILY MEDICINE | Facility: CLINIC | Age: 70
End: 2025-01-20
Payer: MEDICARE

## 2025-01-20 DIAGNOSIS — J45.20 MILD INTERMITTENT ASTHMA WITHOUT COMPLICATION: Primary | ICD-10-CM

## 2025-01-20 NOTE — TELEPHONE ENCOUNTER
Med Issue    Who is Calling: [facility]    Update: Per fax from [facility]:  Beclomethasone HFA (QVAR REDIHALER) is on back order and has not been available for months.  They are asking for Rx for alternative med.      Does caller want a call/response back: No

## 2025-02-20 ENCOUNTER — TELEPHONE (OUTPATIENT)
Dept: FAMILY MEDICINE | Facility: CLINIC | Age: 70
End: 2025-02-20
Payer: MEDICARE

## 2025-02-20 NOTE — LETTER
February 20, 2025      Jayme Selby  [id number] [facility] [id number]      Your healthcare team cares about your health. To provide you with the best care, we have reviewed your chart and based on our findings, we see that you are due to:     Asthma Control Test  This screening tool helps us to assess how well your asthma is controlled.Good asthma control leads to fewer asthma symptoms and greater health. If your asthma is not in good control (score is 19 or less) or you have been to the ER or urgent care for your asthma, it is recommended you be seen by your provider for medication and lifestyle adjustments.  Please complete and return the attached Asthma Control Test. This is valuable information that is requested by your Care Team.    HYPERTENSION FOLLOW UP: Office Visit  PREVENTATIVE VISIT: Annual Medicare Wellness:Schedule an Annual Medicare Wellness Exam. Please call your [facility] clinic to set up your appointment.    If you have already completed these items, please contact the clinic via phone or Mychart so your care team can review and update your records.  Thank you for choosing [facility] for your healthcare needs. For any questions, concerns, or to schedule an appointment please contact the clinic.     Healthy Regards,    Your [facility] Care Team

## 2025-02-20 NOTE — TELEPHONE ENCOUNTER
Patient Quality Outreach    Patient is due for the following:   Asthma  -  ACT needed and Asthma follow-up visit  Hypertension -  Hypertension follow-up visit  Physical Preventive Adult Physical    Action(s) Taken:   Schedule a Annual Wellness Visit    Type of outreach:    Sent letter.    Questions for provider review:    None           Jannet Bui

## 2025-02-27 ENCOUNTER — VIRTUAL VISIT (OUTPATIENT)
Dept: FAMILY MEDICINE | Facility: CLINIC | Age: 70
End: 2025-02-27
Payer: MEDICARE

## 2025-02-27 DIAGNOSIS — J45.20 MILD INTERMITTENT ASTHMA WITHOUT COMPLICATION: ICD-10-CM

## 2025-02-27 DIAGNOSIS — J30.2 SEASONAL ALLERGIC RHINITIS, UNSPECIFIED TRIGGER: ICD-10-CM

## 2025-02-27 DIAGNOSIS — J45.30 MILD PERSISTENT ASTHMA WITHOUT COMPLICATION: Primary | ICD-10-CM

## 2025-02-27 PROBLEM — Q01.9 ENCEPHALOCELE (H): Status: RESOLVED | Noted: 2024-06-13 | Resolved: 2025-02-27

## 2025-02-27 RX ORDER — FLUTICASONE PROPIONATE 50 MCG
SPRAY, SUSPENSION (ML) NASAL
Qty: 16 G | Refills: 11 | Status: SHIPPED | OUTPATIENT
Start: 2025-02-27

## 2025-02-27 RX ORDER — ALBUTEROL SULFATE 90 UG/1
INHALANT RESPIRATORY (INHALATION)
Qty: 6.7 G | Refills: 11 | Status: SHIPPED | OUTPATIENT
Start: 2025-02-27

## 2025-02-27 NOTE — PROGRESS NOTES
"Phillip is a 70 year old who is being evaluated via a billable video visit.    How would you like to obtain your AVS? MyChart  If the video visit is dropped, the invitation should be resent by: Text to cell phone: [phone]  Will anyone else be joining your video visit? No      Assessment & Plan     Mild persistent asthma without complication  Trial;  - budesonide (PULMICORT FLEXHALER) 90 MCG/ACT inhaler; Inhale 2 puffs into the lungs 2 times daily.    Mild intermittent asthma without complication    - albuterol (PROAIR HFA) 108 (90 Base) MCG/ACT inhaler; INHALE 2 PUFFS INTO THE LUNGS EVERY 4 HOURS AS NEEDED    Seasonal allergic rhinitis, unspecified trigger    - fluticasone (FLONASE) 50 MCG/ACT nasal spray; USE 2 SPRAYS IN EACH NOSTRIL EVERY DAY AS NEEDED          BMI  Estimated body mass index is 32.35 kg/m  as calculated from the following:    Height as of 6/13/24: 1.88 m (6' 2\").    Weight as of 6/13/24: 114.3 kg (252 lb).         FUTURE APPOINTMENTS:       - Follow-up for annual visit or as needed    Subjective   Phillip is a 70 year old, presenting for the following health issues:  Asthma        2/27/2025     2:23 PM   Additional Questions   Roomed by Radha SCOTT     Video Start Time:  1452    HPI      Asthma Follow-Up    Was ACT completed today?  No    Do you have a cough?  No  Are you experiencing any wheezing in your chest?  No  Do you have any shortness of breath?  No   How often are you using a short-acting (rescue) inhaler or nebulizer, such as Albuterol?   1-2 times a day   How many days per week do you miss taking your asthma controller medication?  0  Please describe any recent triggers for your asthma: None  Have you had any Emergency Room Visits, Urgent Care Visits, or Hospital Admissions since your last office visit?  No  How many servings of fruits and vegetables do you eat daily?  2-3  On average, how many sweetened beverages do you drink each day (Examples: soda, juice, sweet tea, etc.  Do NOT count " diet or artificially sweetened beverages)?   1  How many days per week do you exercise enough to make your heart beat faster? 3 or less  How many minutes a day do you exercise enough to make your heart beat faster? 9 or less  How many days per week do you miss taking your medication? 0    Medication Followup of asmanax  Taking Medication as prescribed: yes  Side Effects:  vision changes  Medication Helping Symptoms:  NO-wondering if he can get the qvar back, this doesn't help as much       Qvar has been out of sock for months, confirmed with [employer] that this is still out of stock.      Review of Systems  Constitutional, HEENT, cardiovascular, pulmonary, gi and gu systems are negative, except as otherwise noted.      Objective           Vitals:  No vitals were obtained today due to virtual visit.    Physical Exam   GENERAL: alert and no distress  EYES: Eyes grossly normal to inspection.  No discharge or erythema, or obvious scleral/conjunctival abnormalities.  RESP: No audible wheeze, cough, or visible cyanosis.    SKIN: Visible skin clear. No significant rash, abnormal pigmentation or lesions.  NEURO: Cranial nerves grossly intact.  Mentation and speech appropriate for age.  PSYCH: Appropriate affect, tone, and pace of words          Video-Visit Details    Type of service:  Video Visit   Video End Time:4:57 PM  Originating Location (pt. Location): Home    Distant Location (provider location):  On-site  Platform used for Video Visit: [place]  Signed Electronically by: STEPAN Barreto CNP

## 2025-06-18 ENCOUNTER — PATIENT OUTREACH (OUTPATIENT)
Dept: CARE COORDINATION | Facility: CLINIC | Age: 70
End: 2025-06-18
Payer: MEDICARE

## 2025-06-19 DIAGNOSIS — Z12.11 COLON CANCER SCREENING: ICD-10-CM

## 2025-06-30 ENCOUNTER — LAB (OUTPATIENT)
Dept: LAB | Facility: CLINIC | Age: 70
End: 2025-06-30
Payer: MEDICARE

## 2025-06-30 DIAGNOSIS — Z12.11 SCREEN FOR COLON CANCER: Primary | ICD-10-CM

## 2025-07-03 ENCOUNTER — ORDERS ONLY (AUTO-RELEASED) (OUTPATIENT)
Dept: URGENT CARE | Facility: CLINIC | Age: 70
End: 2025-07-03
Payer: MEDICARE

## 2025-07-03 DIAGNOSIS — Z12.11 COLON CANCER SCREENING: ICD-10-CM

## 2025-07-13 ENCOUNTER — HEALTH MAINTENANCE LETTER (OUTPATIENT)
Age: 70
End: 2025-07-13

## (undated) DEVICE — CATH LUMBAR DRAINAGE INS5010

## (undated) DEVICE — SU ETHILON 3-0 PS-1 18" 1663H

## (undated) DEVICE — SOL NACL 0.9% INJ 1000ML BAG 07983-09

## (undated) DEVICE — SOL NACL 0.9% IRRIG 1000ML BOTTLE 2F7124

## (undated) DEVICE — GLOVE PROTEXIS BLUE W/NEU-THERA 7.5  2D73EB75

## (undated) DEVICE — ENDO INSERT ESU BIPOLAR FCP STORZ VERTICAL CLOSING 28164FGL

## (undated) DEVICE — GLOVE PROTEXIS MICRO 7.5  2D73PM75

## (undated) DEVICE — ESU ELEC NDL 6" COATED/INSULATED E1465-6

## (undated) DEVICE — PREP POVIDONE-IODINE 7.5% SCRUB 4OZ BOTTLE MDS093945

## (undated) DEVICE — PREP CHLORAPREP CLEAR 3ML 930400

## (undated) DEVICE — SPLINT NASAL DOYLE BREATHEASY 20-10500

## (undated) DEVICE — ESU GROUND PAD ADULT W/CORD E7507

## (undated) DEVICE — DRSG GAUZE 4X4" TRAY 6939

## (undated) DEVICE — LIGHT HANDLE X1 31140133

## (undated) DEVICE — SU SILK 2-0 TIE 12X30" A305H

## (undated) DEVICE — TAPE MICROFOAM 4" 1528-4

## (undated) DEVICE — DRAIN JACKSON PRATT 07MM FLAT SU130-1310

## (undated) DEVICE — BUR 30K TAPER CHOANAL ATRESIA 1884015RTD

## (undated) DEVICE — PACK NEURO MINOR UMMC SNE32MNMU4

## (undated) DEVICE — SOL WATER IRRIG 1000ML BOTTLE 2F7114

## (undated) DEVICE — TRACKER ENT OTS INSTRUMENT FUSION 9733533

## (undated) DEVICE — NDL 27GA 1.25" 305136

## (undated) DEVICE — DRAPE CORETEMP FLUID WARM SYSTEM 62X56IN CTD200

## (undated) DEVICE — TUBING SUCTION MEDI-VAC SOFT 3/16"X20' N520A

## (undated) DEVICE — DRSG NASOPORE FRAG FIRM 8CM 5400-020-008

## (undated) DEVICE — LABEL MEDICATION SYSTEM 3303-P

## (undated) DEVICE — BLADE SHAVER SERRATED 4MM ROTATE 1884002HRE

## (undated) DEVICE — BLADE SHAVER SINUS 3.5MM RAD 60 ROTATE 1883516HRE

## (undated) DEVICE — SYR 30ML LL W/O NDL 302832

## (undated) DEVICE — DRAPE POUCH INSTRUMENT 1018

## (undated) DEVICE — NDL BLUNT 18GA 1" W/O FILTER 305181

## (undated) DEVICE — SYR 10ML LL W/O NDL 302995

## (undated) DEVICE — ESU SUCTION CAUTERY 10FR FOOT CONTROL E2505-10FR

## (undated) DEVICE — TRACKER PATIENT NON-INVASIVE AXIEM 9734887XOM

## (undated) DEVICE — LINEN TOWEL PACK X6 WHITE 5487

## (undated) DEVICE — SURGICEL HEMOSTAT 4X8" 1952

## (undated) DEVICE — ENDO SHEATH STORZ SHARPSITE ENDOSCRUB 0DEG 4MM 1912000

## (undated) DEVICE — DRSG TELFA 3"X8" NON25720

## (undated) DEVICE — ESU BIPOLAR SEALER AQUAMANTYS MIS FLEX 23-321-1

## (undated) DEVICE — PREP DURAPREP 26ML APL 8630

## (undated) DEVICE — PITCHER STERILE 1000ML  SSK9004A

## (undated) DEVICE — ESU PENCIL W/COATED BLADE E2450H

## (undated) DEVICE — SOL NACL 0.9% 10ML VIAL 0409-4888-02

## (undated) DEVICE — BLADE SHAVER SINUS 4.0MM RAD 40 ROTATE  1884006HR

## (undated) DEVICE — ANTIFOG SOLUTION W/FOAM PAD CF-1002

## (undated) DEVICE — SUCTION MANIFOLD NEPTUNE 2 SYS 4 PORT 0702-020-000

## (undated) DEVICE — DRAPE IOBAN INCISE 13X13" 6640EZ

## (undated) DEVICE — LINEN TOWEL PACK X30 5481

## (undated) DEVICE — DRAIN JACKSON PRATT RESERVOIR 100ML SU130-1305

## (undated) DEVICE — SPONGE LAP 18X18" X8435

## (undated) DEVICE — LINEN TOWEL PACK X5 5464

## (undated) DEVICE — DRAPE MAYO STAND 23X54 8337

## (undated) DEVICE — SYR EAR BULB 3OZ 0035830

## (undated) DEVICE — ENDO SHEATH STORZ SHARPSITE ENDOSCRUB 30DEG 4MM 1912010

## (undated) DEVICE — SPONGE SURGIFOAM 100 1974

## (undated) DEVICE — STRAP UNIVERSAL POSITIONING 2-PIECE 4X47X76" 91-287

## (undated) DEVICE — EYE FLUORESCEIN OPHTHALMIC STRIP FLO-GLO 1272111

## (undated) DEVICE — PREP POVIDONE IODINE SOLUTION 10% 4OZ BOTTLE 29906-004

## (undated) DEVICE — SPONGE COTTONOID 1/2X3" 80-1407

## (undated) DEVICE — SYR 03ML LL W/O NDL 309657

## (undated) RX ORDER — PROPOFOL 10 MG/ML
INJECTION, EMULSION INTRAVENOUS
Status: DISPENSED
Start: 2022-08-02

## (undated) RX ORDER — EPHEDRINE SULFATE 50 MG/ML
INJECTION, SOLUTION INTRAMUSCULAR; INTRAVENOUS; SUBCUTANEOUS
Status: DISPENSED
Start: 2022-08-02

## (undated) RX ORDER — OXYMETAZOLINE HYDROCHLORIDE 0.05 G/100ML
SPRAY NASAL
Status: DISPENSED
Start: 2022-08-02

## (undated) RX ORDER — LIDOCAINE HYDROCHLORIDE AND EPINEPHRINE 10; 10 MG/ML; UG/ML
INJECTION, SOLUTION INFILTRATION; PERINEURAL
Status: DISPENSED
Start: 2022-08-02

## (undated) RX ORDER — DEXAMETHASONE SODIUM PHOSPHATE 4 MG/ML
INJECTION, SOLUTION INTRA-ARTICULAR; INTRALESIONAL; INTRAMUSCULAR; INTRAVENOUS; SOFT TISSUE
Status: DISPENSED
Start: 2022-08-02

## (undated) RX ORDER — LABETALOL HYDROCHLORIDE 5 MG/ML
INJECTION, SOLUTION INTRAVENOUS
Status: DISPENSED
Start: 2022-08-02

## (undated) RX ORDER — HYDROMORPHONE HYDROCHLORIDE 1 MG/ML
INJECTION, SOLUTION INTRAMUSCULAR; INTRAVENOUS; SUBCUTANEOUS
Status: DISPENSED
Start: 2022-08-02

## (undated) RX ORDER — LIDOCAINE HYDROCHLORIDE 20 MG/ML
INJECTION, SOLUTION EPIDURAL; INFILTRATION; INTRACAUDAL; PERINEURAL
Status: DISPENSED
Start: 2022-08-02

## (undated) RX ORDER — FENTANYL CITRATE 50 UG/ML
INJECTION, SOLUTION INTRAMUSCULAR; INTRAVENOUS
Status: DISPENSED
Start: 2022-08-02

## (undated) RX ORDER — SODIUM CHLORIDE, SODIUM LACTATE, POTASSIUM CHLORIDE, CALCIUM CHLORIDE 600; 310; 30; 20 MG/100ML; MG/100ML; MG/100ML; MG/100ML
INJECTION, SOLUTION INTRAVENOUS
Status: DISPENSED
Start: 2022-08-02

## (undated) RX ORDER — GLYCOPYRROLATE 0.2 MG/ML
INJECTION, SOLUTION INTRAMUSCULAR; INTRAVENOUS
Status: DISPENSED
Start: 2022-08-02

## (undated) RX ORDER — ONDANSETRON 2 MG/ML
INJECTION INTRAMUSCULAR; INTRAVENOUS
Status: DISPENSED
Start: 2022-08-02

## (undated) RX ORDER — EPINEPHRINE NASAL SOLUTION 1 MG/ML
SOLUTION NASAL
Status: DISPENSED
Start: 2022-08-02

## (undated) RX ORDER — FENTANYL CITRATE-0.9 % NACL/PF 10 MCG/ML
PLASTIC BAG, INJECTION (ML) INTRAVENOUS
Status: DISPENSED
Start: 2022-08-02

## (undated) RX ORDER — OXYCODONE HYDROCHLORIDE 10 MG/1
TABLET ORAL
Status: DISPENSED
Start: 2022-08-02

## (undated) RX ORDER — CEFTRIAXONE 2 G/1
INJECTION, POWDER, FOR SOLUTION INTRAMUSCULAR; INTRAVENOUS
Status: DISPENSED
Start: 2022-08-02